# Patient Record
Sex: FEMALE | Race: WHITE | NOT HISPANIC OR LATINO | Employment: OTHER | URBAN - METROPOLITAN AREA
[De-identification: names, ages, dates, MRNs, and addresses within clinical notes are randomized per-mention and may not be internally consistent; named-entity substitution may affect disease eponyms.]

---

## 2023-04-20 LAB
RBC, URINE: NORMAL /HPF (ref 0–5)
SQUAMOUS EPITHELIAL CELLS, URINE: <1 /HPF
WBC, URINE: NORMAL /HPF (ref 0–5)

## 2023-09-13 LAB
ALANINE AMINOTRANSFERASE (SGPT) (U/L) IN SER/PLAS: 8 U/L (ref 7–45)
ALBUMIN (G/DL) IN SER/PLAS: 4.2 G/DL (ref 3.4–5)
ALKALINE PHOSPHATASE (U/L) IN SER/PLAS: 65 U/L (ref 33–136)
ANION GAP IN SER/PLAS: 12 MMOL/L (ref 10–20)
ASPARTATE AMINOTRANSFERASE (SGOT) (U/L) IN SER/PLAS: 13 U/L (ref 9–39)
BILIRUBIN TOTAL (MG/DL) IN SER/PLAS: 0.6 MG/DL (ref 0–1.2)
CALCIUM (MG/DL) IN SER/PLAS: 9.3 MG/DL (ref 8.6–10.3)
CARBON DIOXIDE, TOTAL (MMOL/L) IN SER/PLAS: 29 MMOL/L (ref 21–32)
CHLORIDE (MMOL/L) IN SER/PLAS: 103 MMOL/L (ref 98–107)
CHOLESTEROL (MG/DL) IN SER/PLAS: 197 MG/DL (ref 0–199)
CHOLESTEROL IN HDL (MG/DL) IN SER/PLAS: 71.2 MG/DL
CHOLESTEROL/HDL RATIO: 2.8
CREATININE (MG/DL) IN SER/PLAS: 1.06 MG/DL (ref 0.5–1.05)
ERYTHROCYTE DISTRIBUTION WIDTH (RATIO) BY AUTOMATED COUNT: 15.9 % (ref 11.5–14.5)
ERYTHROCYTE MEAN CORPUSCULAR HEMOGLOBIN CONCENTRATION (G/DL) BY AUTOMATED: 30.7 G/DL (ref 32–36)
ERYTHROCYTE MEAN CORPUSCULAR VOLUME (FL) BY AUTOMATED COUNT: 95 FL (ref 80–100)
ERYTHROCYTES (10*6/UL) IN BLOOD BY AUTOMATED COUNT: 4.98 X10E12/L (ref 4–5.2)
GFR FEMALE: 52 ML/MIN/1.73M2
GLUCOSE (MG/DL) IN SER/PLAS: 112 MG/DL (ref 74–99)
HEMATOCRIT (%) IN BLOOD BY AUTOMATED COUNT: 47.2 % (ref 36–46)
HEMOGLOBIN (G/DL) IN BLOOD: 14.5 G/DL (ref 12–16)
LDL: 114 MG/DL (ref 0–99)
LEUKOCYTES (10*3/UL) IN BLOOD BY AUTOMATED COUNT: 6.2 X10E9/L (ref 4.4–11.3)
NRBC (PER 100 WBCS) BY AUTOMATED COUNT: 0 /100 WBC (ref 0–0)
PLATELETS (10*3/UL) IN BLOOD AUTOMATED COUNT: 227 X10E9/L (ref 150–450)
POTASSIUM (MMOL/L) IN SER/PLAS: 4 MMOL/L (ref 3.5–5.3)
PROTEIN TOTAL: 7 G/DL (ref 6.4–8.2)
SODIUM (MMOL/L) IN SER/PLAS: 140 MMOL/L (ref 136–145)
THYROTROPIN (MIU/L) IN SER/PLAS BY DETECTION LIMIT <= 0.05 MIU/L: 1.85 MIU/L (ref 0.44–3.98)
TRIGLYCERIDE (MG/DL) IN SER/PLAS: 60 MG/DL (ref 0–149)
UREA NITROGEN (MG/DL) IN SER/PLAS: 18 MG/DL (ref 6–23)
VLDL: 12 MG/DL (ref 0–40)

## 2023-09-14 LAB
ESTIMATED AVERAGE GLUCOSE FOR HBA1C: 126 MG/DL
HEMOGLOBIN A1C/HEMOGLOBIN TOTAL IN BLOOD: 6 %

## 2023-11-28 ENCOUNTER — TELEPHONE (OUTPATIENT)
Dept: PRIMARY CARE | Facility: CLINIC | Age: 83
End: 2023-11-28

## 2023-11-28 NOTE — TELEPHONE ENCOUNTER
Prescription refill request    Amlodipine besylate 2.5    [facility] mail order

## 2023-12-02 DIAGNOSIS — I10 BENIGN HYPERTENSION: Primary | ICD-10-CM

## 2023-12-02 RX ORDER — AMLODIPINE BESYLATE 2.5 MG/1
2.5 TABLET ORAL DAILY
Qty: 90 TABLET | Refills: 1 | Status: SHIPPED | OUTPATIENT
Start: 2023-12-02 | End: 2024-02-07 | Stop reason: SDUPTHER

## 2023-12-02 RX ORDER — AMLODIPINE BESYLATE 2.5 MG/1
2.5 TABLET ORAL DAILY
COMMUNITY
End: 2023-12-02 | Stop reason: SDUPTHER

## 2024-01-17 ENCOUNTER — APPOINTMENT (OUTPATIENT)
Dept: RADIOLOGY | Facility: HOSPITAL | Age: 84
DRG: 280 | End: 2024-01-17
Payer: MEDICARE

## 2024-01-17 ENCOUNTER — HOSPITAL ENCOUNTER (INPATIENT)
Facility: HOSPITAL | Age: 84
LOS: 6 days | Discharge: HOME | DRG: 280 | End: 2024-01-23
Attending: STUDENT IN AN ORGANIZED HEALTH CARE EDUCATION/TRAINING PROGRAM | Admitting: STUDENT IN AN ORGANIZED HEALTH CARE EDUCATION/TRAINING PROGRAM
Payer: MEDICARE

## 2024-01-17 ENCOUNTER — APPOINTMENT (OUTPATIENT)
Dept: CARDIOLOGY | Facility: HOSPITAL | Age: 84
DRG: 280 | End: 2024-01-17
Payer: MEDICARE

## 2024-01-17 DIAGNOSIS — I50.9 CONGESTIVE HEART FAILURE, UNSPECIFIED HF CHRONICITY, UNSPECIFIED HEART FAILURE TYPE (MULTI): ICD-10-CM

## 2024-01-17 DIAGNOSIS — I49.8 VENTRICULAR BIGEMINY: ICD-10-CM

## 2024-01-17 DIAGNOSIS — J96.01 ACUTE HYPOXIC RESPIRATORY FAILURE (MULTI): ICD-10-CM

## 2024-01-17 DIAGNOSIS — J44.1 CHRONIC OBSTRUCTIVE PULMONARY DISEASE WITH ACUTE EXACERBATION (MULTI): ICD-10-CM

## 2024-01-17 DIAGNOSIS — M79.89 LEG SWELLING: ICD-10-CM

## 2024-01-17 DIAGNOSIS — I82.4Z9 DEEP VEIN THROMBOSIS (DVT) OF DISTAL VEIN OF LOWER EXTREMITY, UNSPECIFIED CHRONICITY, UNSPECIFIED LATERALITY (MULTI): ICD-10-CM

## 2024-01-17 DIAGNOSIS — R41.82 AMS (ALTERED MENTAL STATUS): Primary | ICD-10-CM

## 2024-01-17 DIAGNOSIS — I21.4 NSTEMI (NON-ST ELEVATED MYOCARDIAL INFARCTION) (MULTI): ICD-10-CM

## 2024-01-17 LAB
ABO GROUP (TYPE) IN BLOOD: NORMAL
ALBUMIN SERPL BCP-MCNC: 3.7 G/DL (ref 3.4–5)
ALP SERPL-CCNC: 79 U/L (ref 33–136)
ALT SERPL W P-5'-P-CCNC: 135 U/L (ref 7–45)
ANION GAP BLDV CALCULATED.4IONS-SCNC: 12 MMOL/L (ref 10–25)
ANION GAP SERPL CALC-SCNC: 15 MMOL/L (ref 10–20)
ANION GAP SERPL CALC-SCNC: 17 MMOL/L (ref 10–20)
ANTIBODY SCREEN: NORMAL
APPEARANCE UR: ABNORMAL
AST SERPL W P-5'-P-CCNC: 123 U/L (ref 9–39)
BACTERIA #/AREA URNS AUTO: ABNORMAL /HPF
BASE EXCESS BLDV CALC-SCNC: 0.9 MMOL/L (ref -2–3)
BASOPHILS # BLD AUTO: 0.02 X10*3/UL (ref 0–0.1)
BASOPHILS NFR BLD AUTO: 0.2 %
BILIRUB SERPL-MCNC: 0.5 MG/DL (ref 0–1.2)
BILIRUB UR STRIP.AUTO-MCNC: NEGATIVE MG/DL
BNP SERPL-MCNC: 2731 PG/ML (ref 0–99)
BODY TEMPERATURE: 37 DEGREES CELSIUS
BUN SERPL-MCNC: 51 MG/DL (ref 6–23)
BUN SERPL-MCNC: 51 MG/DL (ref 6–23)
CA-I BLDV-SCNC: 1.09 MMOL/L (ref 1.1–1.33)
CALCIUM SERPL-MCNC: 7.8 MG/DL (ref 8.6–10.3)
CALCIUM SERPL-MCNC: 8.1 MG/DL (ref 8.6–10.3)
CARDIAC TROPONIN I PNL SERPL HS: 556 NG/L (ref 0–13)
CARDIAC TROPONIN I PNL SERPL HS: 663 NG/L (ref 0–13)
CARDIAC TROPONIN I PNL SERPL HS: 769 NG/L (ref 0–13)
CHLORIDE BLDV-SCNC: 100 MMOL/L (ref 98–107)
CHLORIDE SERPL-SCNC: 100 MMOL/L (ref 98–107)
CHLORIDE SERPL-SCNC: 99 MMOL/L (ref 98–107)
CHOLEST SERPL-MCNC: 123 MG/DL (ref 0–199)
CHOLESTEROL/HDL RATIO: 2.6
CO2 SERPL-SCNC: 25 MMOL/L (ref 21–32)
CO2 SERPL-SCNC: 29 MMOL/L (ref 21–32)
COLOR UR: ABNORMAL
CREAT SERPL-MCNC: 1.85 MG/DL (ref 0.5–1.05)
CREAT SERPL-MCNC: 2.12 MG/DL (ref 0.5–1.05)
D DIMER PPP FEU-MCNC: 1748 NG/ML FEU
EGFRCR SERPLBLD CKD-EPI 2021: 23 ML/MIN/1.73M*2
EGFRCR SERPLBLD CKD-EPI 2021: 27 ML/MIN/1.73M*2
EOSINOPHIL # BLD AUTO: 0 X10*3/UL (ref 0–0.4)
EOSINOPHIL NFR BLD AUTO: 0 %
ERYTHROCYTE [DISTWIDTH] IN BLOOD BY AUTOMATED COUNT: 15.8 % (ref 11.5–14.5)
FLUAV RNA RESP QL NAA+PROBE: NOT DETECTED
FLUBV RNA RESP QL NAA+PROBE: NOT DETECTED
GLUCOSE BLD MANUAL STRIP-MCNC: 158 MG/DL (ref 74–99)
GLUCOSE BLDV-MCNC: 144 MG/DL (ref 74–99)
GLUCOSE SERPL-MCNC: 133 MG/DL (ref 74–99)
GLUCOSE SERPL-MCNC: 162 MG/DL (ref 74–99)
GLUCOSE UR STRIP.AUTO-MCNC: NEGATIVE MG/DL
HCO3 BLDV-SCNC: 26.6 MMOL/L (ref 22–26)
HCT VFR BLD AUTO: 34.4 % (ref 36–46)
HCT VFR BLD EST: 32 % (ref 36–46)
HDLC SERPL-MCNC: 47.6 MG/DL
HGB BLD-MCNC: 10.4 G/DL (ref 12–16)
HGB BLDV-MCNC: 10.8 G/DL (ref 12–16)
IMM GRANULOCYTES # BLD AUTO: 0.04 X10*3/UL (ref 0–0.5)
IMM GRANULOCYTES NFR BLD AUTO: 0.5 % (ref 0–0.9)
INHALED O2 CONCENTRATION: 36 %
KETONES UR STRIP.AUTO-MCNC: NEGATIVE MG/DL
LACTATE BLDV-SCNC: 2.2 MMOL/L (ref 0.4–2)
LACTATE SERPL-SCNC: 1.4 MMOL/L (ref 0.4–2)
LACTATE SERPL-SCNC: 1.6 MMOL/L (ref 0.4–2)
LDLC SERPL CALC-MCNC: 67 MG/DL
LEUKOCYTE ESTERASE UR QL STRIP.AUTO: NEGATIVE
LYMPHOCYTES # BLD AUTO: 0.95 X10*3/UL (ref 0.8–3)
LYMPHOCYTES NFR BLD AUTO: 11 %
MAGNESIUM SERPL-MCNC: 2.26 MG/DL (ref 1.6–2.4)
MCH RBC QN AUTO: 27.5 PG (ref 26–34)
MCHC RBC AUTO-ENTMCNC: 30.2 G/DL (ref 32–36)
MCV RBC AUTO: 91 FL (ref 80–100)
MONOCYTES # BLD AUTO: 1.04 X10*3/UL (ref 0.05–0.8)
MONOCYTES NFR BLD AUTO: 12 %
NEUTROPHILS # BLD AUTO: 6.61 X10*3/UL (ref 1.6–5.5)
NEUTROPHILS NFR BLD AUTO: 76.3 %
NITRITE UR QL STRIP.AUTO: NEGATIVE
NON HDL CHOLESTEROL: 75 MG/DL (ref 0–149)
NRBC BLD-RTO: 0.2 /100 WBCS (ref 0–0)
OXYHGB MFR BLDV: 80.9 % (ref 45–75)
PCO2 BLDV: 46 MM HG (ref 41–51)
PH BLDV: 7.37 PH (ref 7.33–7.43)
PH UR STRIP.AUTO: 5 [PH]
PLATELET # BLD AUTO: 296 X10*3/UL (ref 150–450)
PO2 BLDV: 56 MM HG (ref 35–45)
POTASSIUM BLDV-SCNC: 5.4 MMOL/L (ref 3.5–5.3)
POTASSIUM SERPL-SCNC: 4.3 MMOL/L (ref 3.5–5.3)
POTASSIUM SERPL-SCNC: 5.4 MMOL/L (ref 3.5–5.3)
PROT SERPL-MCNC: 5.9 G/DL (ref 6.4–8.2)
PROT UR STRIP.AUTO-MCNC: ABNORMAL MG/DL
RBC # BLD AUTO: 3.78 X10*6/UL (ref 4–5.2)
RBC # UR STRIP.AUTO: ABNORMAL /UL
RBC #/AREA URNS AUTO: >20 /HPF
RH FACTOR (ANTIGEN D): NORMAL
RSV RNA RESP QL NAA+PROBE: NOT DETECTED
SAO2 % BLDV: 85 % (ref 45–75)
SARS-COV-2 RNA RESP QL NAA+PROBE: NOT DETECTED
SODIUM BLDV-SCNC: 133 MMOL/L (ref 136–145)
SODIUM SERPL-SCNC: 137 MMOL/L (ref 136–145)
SODIUM SERPL-SCNC: 139 MMOL/L (ref 136–145)
SP GR UR STRIP.AUTO: 1.01
SQUAMOUS #/AREA URNS AUTO: ABNORMAL /HPF
TEST COMMENT: ABNORMAL
TRIGL SERPL-MCNC: 41 MG/DL (ref 0–149)
UFH PPP CHRO-ACNC: 0.7 IU/ML
UROBILINOGEN UR STRIP.AUTO-MCNC: <2 MG/DL
VLDL: 8 MG/DL (ref 0–40)
WBC # BLD AUTO: 8.7 X10*3/UL (ref 4.4–11.3)
WBC #/AREA URNS AUTO: ABNORMAL /HPF

## 2024-01-17 PROCEDURE — 96375 TX/PRO/DX INJ NEW DRUG ADDON: CPT

## 2024-01-17 PROCEDURE — 99285 EMERGENCY DEPT VISIT HI MDM: CPT | Performed by: STUDENT IN AN ORGANIZED HEALTH CARE EDUCATION/TRAINING PROGRAM

## 2024-01-17 PROCEDURE — 71250 CT THORAX DX C-: CPT

## 2024-01-17 PROCEDURE — 94640 AIRWAY INHALATION TREATMENT: CPT

## 2024-01-17 PROCEDURE — 85379 FIBRIN DEGRADATION QUANT: CPT | Performed by: STUDENT IN AN ORGANIZED HEALTH CARE EDUCATION/TRAINING PROGRAM

## 2024-01-17 PROCEDURE — 36415 COLL VENOUS BLD VENIPUNCTURE: CPT | Performed by: STUDENT IN AN ORGANIZED HEALTH CARE EDUCATION/TRAINING PROGRAM

## 2024-01-17 PROCEDURE — 1200000002 HC GENERAL ROOM WITH TELEMETRY DAILY

## 2024-01-17 PROCEDURE — 2500000001 HC RX 250 WO HCPCS SELF ADMINISTERED DRUGS (ALT 637 FOR MEDICARE OP)

## 2024-01-17 PROCEDURE — 93970 EXTREMITY STUDY: CPT

## 2024-01-17 PROCEDURE — 85025 COMPLETE CBC W/AUTO DIFF WBC: CPT | Performed by: STUDENT IN AN ORGANIZED HEALTH CARE EDUCATION/TRAINING PROGRAM

## 2024-01-17 PROCEDURE — 84484 ASSAY OF TROPONIN QUANT: CPT

## 2024-01-17 PROCEDURE — 84132 ASSAY OF SERUM POTASSIUM: CPT

## 2024-01-17 PROCEDURE — 99223 1ST HOSP IP/OBS HIGH 75: CPT

## 2024-01-17 PROCEDURE — 81001 URINALYSIS AUTO W/SCOPE: CPT | Performed by: STUDENT IN AN ORGANIZED HEALTH CARE EDUCATION/TRAINING PROGRAM

## 2024-01-17 PROCEDURE — 86738 MYCOPLASMA ANTIBODY: CPT

## 2024-01-17 PROCEDURE — 83735 ASSAY OF MAGNESIUM: CPT | Performed by: STUDENT IN AN ORGANIZED HEALTH CARE EDUCATION/TRAINING PROGRAM

## 2024-01-17 PROCEDURE — 85520 HEPARIN ASSAY: CPT | Performed by: STUDENT IN AN ORGANIZED HEALTH CARE EDUCATION/TRAINING PROGRAM

## 2024-01-17 PROCEDURE — 86901 BLOOD TYPING SEROLOGIC RH(D): CPT

## 2024-01-17 PROCEDURE — 87637 SARSCOV2&INF A&B&RSV AMP PRB: CPT | Performed by: STUDENT IN AN ORGANIZED HEALTH CARE EDUCATION/TRAINING PROGRAM

## 2024-01-17 PROCEDURE — 2500000002 HC RX 250 W HCPCS SELF ADMINISTERED DRUGS (ALT 637 FOR MEDICARE OP, ALT 636 FOR OP/ED): Mod: MUE

## 2024-01-17 PROCEDURE — 83605 ASSAY OF LACTIC ACID: CPT | Performed by: STUDENT IN AN ORGANIZED HEALTH CARE EDUCATION/TRAINING PROGRAM

## 2024-01-17 PROCEDURE — 70450 CT HEAD/BRAIN W/O DYE: CPT

## 2024-01-17 PROCEDURE — 2500000004 HC RX 250 GENERAL PHARMACY W/ HCPCS (ALT 636 FOR OP/ED): Performed by: STUDENT IN AN ORGANIZED HEALTH CARE EDUCATION/TRAINING PROGRAM

## 2024-01-17 PROCEDURE — 84132 ASSAY OF SERUM POTASSIUM: CPT | Performed by: STUDENT IN AN ORGANIZED HEALTH CARE EDUCATION/TRAINING PROGRAM

## 2024-01-17 PROCEDURE — 96365 THER/PROPH/DIAG IV INF INIT: CPT

## 2024-01-17 PROCEDURE — 83880 ASSAY OF NATRIURETIC PEPTIDE: CPT | Performed by: STUDENT IN AN ORGANIZED HEALTH CARE EDUCATION/TRAINING PROGRAM

## 2024-01-17 PROCEDURE — 82947 ASSAY GLUCOSE BLOOD QUANT: CPT

## 2024-01-17 PROCEDURE — 71045 X-RAY EXAM CHEST 1 VIEW: CPT

## 2024-01-17 PROCEDURE — 84484 ASSAY OF TROPONIN QUANT: CPT | Performed by: STUDENT IN AN ORGANIZED HEALTH CARE EDUCATION/TRAINING PROGRAM

## 2024-01-17 PROCEDURE — 71045 X-RAY EXAM CHEST 1 VIEW: CPT | Performed by: RADIOLOGY

## 2024-01-17 PROCEDURE — 93005 ELECTROCARDIOGRAM TRACING: CPT

## 2024-01-17 PROCEDURE — 80061 LIPID PANEL: CPT

## 2024-01-17 RX ORDER — POLYETHYLENE GLYCOL 3350 17 G/17G
17 POWDER, FOR SOLUTION ORAL DAILY
Status: DISCONTINUED | OUTPATIENT
Start: 2024-01-18 | End: 2024-01-23 | Stop reason: HOSPADM

## 2024-01-17 RX ORDER — HEPARIN SODIUM 10000 [USP'U]/100ML
0-4500 INJECTION, SOLUTION INTRAVENOUS CONTINUOUS
Status: DISCONTINUED | OUTPATIENT
Start: 2024-01-17 | End: 2024-01-19

## 2024-01-17 RX ORDER — DEXTROSE 50 % IN WATER (D50W) INTRAVENOUS SYRINGE
25
Status: DISCONTINUED | OUTPATIENT
Start: 2024-01-17 | End: 2024-01-23 | Stop reason: HOSPADM

## 2024-01-17 RX ORDER — ACETAMINOPHEN 160 MG/5ML
650 SOLUTION ORAL EVERY 4 HOURS PRN
Status: DISCONTINUED | OUTPATIENT
Start: 2024-01-17 | End: 2024-01-23 | Stop reason: HOSPADM

## 2024-01-17 RX ORDER — DEXTROSE MONOHYDRATE 100 MG/ML
0.3 INJECTION, SOLUTION INTRAVENOUS ONCE AS NEEDED
Status: DISCONTINUED | OUTPATIENT
Start: 2024-01-17 | End: 2024-01-23 | Stop reason: HOSPADM

## 2024-01-17 RX ORDER — IPRATROPIUM BROMIDE AND ALBUTEROL SULFATE 2.5; .5 MG/3ML; MG/3ML
3 SOLUTION RESPIRATORY (INHALATION)
Status: DISCONTINUED | OUTPATIENT
Start: 2024-01-18 | End: 2024-01-23 | Stop reason: HOSPADM

## 2024-01-17 RX ORDER — CEFTRIAXONE 1 G/50ML
1 INJECTION, SOLUTION INTRAVENOUS EVERY 24 HOURS
Status: DISCONTINUED | OUTPATIENT
Start: 2024-01-17 | End: 2024-01-23 | Stop reason: HOSPADM

## 2024-01-17 RX ORDER — ACETAMINOPHEN 325 MG/1
650 TABLET ORAL EVERY 4 HOURS PRN
Status: DISCONTINUED | OUTPATIENT
Start: 2024-01-17 | End: 2024-01-23 | Stop reason: HOSPADM

## 2024-01-17 RX ORDER — ASPIRIN 325 MG
325 TABLET ORAL ONCE
Status: COMPLETED | OUTPATIENT
Start: 2024-01-17 | End: 2024-01-17

## 2024-01-17 RX ORDER — INSULIN LISPRO 100 [IU]/ML
0-5 INJECTION, SOLUTION INTRAVENOUS; SUBCUTANEOUS 3 TIMES DAILY
Status: DISCONTINUED | OUTPATIENT
Start: 2024-01-17 | End: 2024-01-23 | Stop reason: HOSPADM

## 2024-01-17 RX ORDER — METFORMIN HYDROCHLORIDE 500 MG/1
1 TABLET ORAL DAILY
COMMUNITY
Start: 2022-03-01

## 2024-01-17 RX ORDER — HEPARIN SODIUM 5000 [USP'U]/ML
80 INJECTION, SOLUTION INTRAVENOUS; SUBCUTANEOUS ONCE
Status: COMPLETED | OUTPATIENT
Start: 2024-01-17 | End: 2024-01-17

## 2024-01-17 RX ORDER — AMLODIPINE BESYLATE 5 MG/1
2.5 TABLET ORAL DAILY
Status: DISCONTINUED | OUTPATIENT
Start: 2024-01-18 | End: 2024-01-23 | Stop reason: HOSPADM

## 2024-01-17 RX ORDER — NAPROXEN SODIUM 220 MG/1
81 TABLET, FILM COATED ORAL DAILY
Status: DISCONTINUED | OUTPATIENT
Start: 2024-01-18 | End: 2024-01-23 | Stop reason: HOSPADM

## 2024-01-17 RX ORDER — LEVOTHYROXINE SODIUM 75 UG/1
75 TABLET ORAL DAILY
Status: DISCONTINUED | OUTPATIENT
Start: 2024-01-18 | End: 2024-01-23 | Stop reason: HOSPADM

## 2024-01-17 RX ORDER — HEPARIN SODIUM 5000 [USP'U]/ML
2000-4000 INJECTION, SOLUTION INTRAVENOUS; SUBCUTANEOUS EVERY 4 HOURS PRN
Status: DISCONTINUED | OUTPATIENT
Start: 2024-01-17 | End: 2024-01-19

## 2024-01-17 RX ORDER — FUROSEMIDE 10 MG/ML
40 INJECTION INTRAMUSCULAR; INTRAVENOUS ONCE
Status: COMPLETED | OUTPATIENT
Start: 2024-01-17 | End: 2024-01-17

## 2024-01-17 RX ORDER — IPRATROPIUM BROMIDE AND ALBUTEROL SULFATE 2.5; .5 MG/3ML; MG/3ML
3 SOLUTION RESPIRATORY (INHALATION) EVERY 20 MIN
Status: DISCONTINUED | OUTPATIENT
Start: 2024-01-17 | End: 2024-01-17

## 2024-01-17 RX ORDER — MAGNESIUM SULFATE HEPTAHYDRATE 40 MG/ML
2 INJECTION, SOLUTION INTRAVENOUS ONCE
Status: COMPLETED | OUTPATIENT
Start: 2024-01-17 | End: 2024-01-17

## 2024-01-17 RX ORDER — IPRATROPIUM BROMIDE AND ALBUTEROL SULFATE 2.5; .5 MG/3ML; MG/3ML
SOLUTION RESPIRATORY (INHALATION)
Status: COMPLETED
Start: 2024-01-17 | End: 2024-01-17

## 2024-01-17 RX ORDER — IPRATROPIUM BROMIDE AND ALBUTEROL SULFATE 2.5; .5 MG/3ML; MG/3ML
3 SOLUTION RESPIRATORY (INHALATION)
Status: DISCONTINUED | OUTPATIENT
Start: 2024-01-17 | End: 2024-01-17

## 2024-01-17 RX ORDER — IPRATROPIUM BROMIDE AND ALBUTEROL SULFATE 2.5; .5 MG/3ML; MG/3ML
3 SOLUTION RESPIRATORY (INHALATION) EVERY 2 HOUR PRN
Status: DISCONTINUED | OUTPATIENT
Start: 2024-01-17 | End: 2024-01-23 | Stop reason: HOSPADM

## 2024-01-17 RX ORDER — LEVOTHYROXINE SODIUM 75 UG/1
1 TABLET ORAL DAILY
COMMUNITY
Start: 2021-09-08 | End: 2024-02-12 | Stop reason: SDUPTHER

## 2024-01-17 RX ADMIN — METHYLPREDNISOLONE SODIUM SUCCINATE 125 MG: 125 INJECTION INTRAMUSCULAR; INTRAVENOUS at 15:15

## 2024-01-17 RX ADMIN — ASPIRIN 325 MG ORAL TABLET 325 MG: 325 PILL ORAL at 23:22

## 2024-01-17 RX ADMIN — HEPARIN SODIUM 4450 UNITS: 5000 INJECTION INTRAVENOUS; SUBCUTANEOUS at 17:28

## 2024-01-17 RX ADMIN — HEPARIN SODIUM 1000 UNITS/HR: 10000 INJECTION, SOLUTION INTRAVENOUS at 17:26

## 2024-01-17 RX ADMIN — IPRATROPIUM BROMIDE AND ALBUTEROL SULFATE 3 ML: .5; 3 SOLUTION RESPIRATORY (INHALATION) at 14:54

## 2024-01-17 RX ADMIN — FUROSEMIDE 40 MG: 10 INJECTION, SOLUTION INTRAMUSCULAR; INTRAVENOUS at 17:21

## 2024-01-17 RX ADMIN — MAGNESIUM SULFATE HEPTAHYDRATE 2 G: 40 INJECTION, SOLUTION INTRAVENOUS at 15:16

## 2024-01-17 SDOH — HEALTH STABILITY: PHYSICAL HEALTH: ON AVERAGE, HOW MANY MINUTES DO YOU ENGAGE IN EXERCISE AT THIS LEVEL?: 0 MIN

## 2024-01-17 SDOH — SOCIAL STABILITY: SOCIAL INSECURITY: ABUSE: ADULT

## 2024-01-17 SDOH — SOCIAL STABILITY: SOCIAL INSECURITY: ARE YOU OR HAVE YOU BEEN THREATENED OR ABUSED PHYSICALLY, EMOTIONALLY, OR SEXUALLY BY ANYONE?: NO

## 2024-01-17 SDOH — HEALTH STABILITY: MENTAL HEALTH: HOW OFTEN DO YOU HAVE A DRINK CONTAINING ALCOHOL?: NEVER

## 2024-01-17 SDOH — ECONOMIC STABILITY: FOOD INSECURITY: WITHIN THE PAST 12 MONTHS, THE FOOD YOU BOUGHT JUST DIDN'T LAST AND YOU DIDN'T HAVE MONEY TO GET MORE.: NEVER TRUE

## 2024-01-17 SDOH — SOCIAL STABILITY: SOCIAL INSECURITY
WITHIN THE LAST YEAR, HAVE YOU BEEN KICKED, HIT, SLAPPED, OR OTHERWISE PHYSICALLY HURT BY YOUR PARTNER OR EX-PARTNER?: NO

## 2024-01-17 SDOH — SOCIAL STABILITY: SOCIAL INSECURITY: HAVE YOU HAD THOUGHTS OF HARMING ANYONE ELSE?: NO

## 2024-01-17 SDOH — HEALTH STABILITY: PHYSICAL HEALTH: ON AVERAGE, HOW MANY DAYS PER WEEK DO YOU ENGAGE IN MODERATE TO STRENUOUS EXERCISE (LIKE A BRISK WALK)?: 0 DAYS

## 2024-01-17 SDOH — SOCIAL STABILITY: SOCIAL NETWORK: HOW OFTEN DO YOU ATTEND CHURCH OR RELIGIOUS SERVICES?: NEVER

## 2024-01-17 SDOH — HEALTH STABILITY: MENTAL HEALTH
STRESS IS WHEN SOMEONE FEELS TENSE, NERVOUS, ANXIOUS, OR CAN'T SLEEP AT NIGHT BECAUSE THEIR MIND IS TROUBLED. HOW STRESSED ARE YOU?: NOT AT ALL

## 2024-01-17 SDOH — SOCIAL STABILITY: SOCIAL NETWORK
DO YOU BELONG TO ANY CLUBS OR ORGANIZATIONS SUCH AS CHURCH GROUPS UNIONS, FRATERNAL OR ATHLETIC GROUPS, OR SCHOOL GROUPS?: NO

## 2024-01-17 SDOH — SOCIAL STABILITY: SOCIAL INSECURITY: WITHIN THE LAST YEAR, HAVE YOU BEEN AFRAID OF YOUR PARTNER OR EX-PARTNER?: NO

## 2024-01-17 SDOH — SOCIAL STABILITY: SOCIAL NETWORK: HOW OFTEN DO YOU ATTENT MEETINGS OF THE CLUB OR ORGANIZATION YOU BELONG TO?: NEVER

## 2024-01-17 SDOH — SOCIAL STABILITY: SOCIAL INSECURITY
WITHIN THE LAST YEAR, HAVE TO BEEN RAPED OR FORCED TO HAVE ANY KIND OF SEXUAL ACTIVITY BY YOUR PARTNER OR EX-PARTNER?: NO

## 2024-01-17 SDOH — HEALTH STABILITY: MENTAL HEALTH: HOW MANY STANDARD DRINKS CONTAINING ALCOHOL DO YOU HAVE ON A TYPICAL DAY?: PATIENT DOES NOT DRINK

## 2024-01-17 SDOH — SOCIAL STABILITY: SOCIAL NETWORK: IN A TYPICAL WEEK, HOW MANY TIMES DO YOU TALK ON THE PHONE WITH FAMILY, FRIENDS, OR NEIGHBORS?: ONCE A WEEK

## 2024-01-17 SDOH — SOCIAL STABILITY: SOCIAL INSECURITY: DO YOU FEEL UNSAFE GOING BACK TO THE PLACE WHERE YOU ARE LIVING?: NO

## 2024-01-17 SDOH — HEALTH STABILITY: MENTAL HEALTH: HOW OFTEN DO YOU HAVE 6 OR MORE DRINKS ON ONE OCCASION?: NEVER

## 2024-01-17 SDOH — SOCIAL STABILITY: SOCIAL INSECURITY: WITHIN THE LAST YEAR, HAVE YOU BEEN HUMILIATED OR EMOTIONALLY ABUSED IN OTHER WAYS BY YOUR PARTNER OR EX-PARTNER?: NO

## 2024-01-17 SDOH — ECONOMIC STABILITY: FOOD INSECURITY: WITHIN THE PAST 12 MONTHS, YOU WORRIED THAT YOUR FOOD WOULD RUN OUT BEFORE YOU GOT MONEY TO BUY MORE.: NEVER TRUE

## 2024-01-17 SDOH — SOCIAL STABILITY: SOCIAL INSECURITY: DO YOU FEEL ANYONE HAS EXPLOITED OR TAKEN ADVANTAGE OF YOU FINANCIALLY OR OF YOUR PERSONAL PROPERTY?: NO

## 2024-01-17 SDOH — SOCIAL STABILITY: SOCIAL NETWORK: ARE YOU MARRIED, WIDOWED, DIVORCED, SEPARATED, NEVER MARRIED, OR LIVING WITH A PARTNER?: WIDOWED

## 2024-01-17 SDOH — ECONOMIC STABILITY: INCOME INSECURITY: IN THE PAST 12 MONTHS, HAS THE ELECTRIC, GAS, OIL, OR WATER COMPANY THREATENED TO SHUT OFF SERVICE IN YOUR HOME?: NO

## 2024-01-17 SDOH — SOCIAL STABILITY: SOCIAL NETWORK: HOW OFTEN DO YOU GET TOGETHER WITH FRIENDS OR RELATIVES?: NEVER

## 2024-01-17 SDOH — SOCIAL STABILITY: SOCIAL INSECURITY: HAS ANYONE EVER THREATENED TO HURT YOUR FAMILY OR YOUR PETS?: NO

## 2024-01-17 SDOH — SOCIAL STABILITY: SOCIAL INSECURITY: ARE THERE ANY APPARENT SIGNS OF INJURIES/BEHAVIORS THAT COULD BE RELATED TO ABUSE/NEGLECT?: NO

## 2024-01-17 SDOH — SOCIAL STABILITY: SOCIAL INSECURITY: DOES ANYONE TRY TO KEEP YOU FROM HAVING/CONTACTING OTHER FRIENDS OR DOING THINGS OUTSIDE YOUR HOME?: NO

## 2024-01-17 SDOH — SOCIAL STABILITY: SOCIAL INSECURITY: WERE YOU ABLE TO COMPLETE ALL THE BEHAVIORAL HEALTH SCREENINGS?: YES

## 2024-01-17 ASSESSMENT — PATIENT HEALTH QUESTIONNAIRE - PHQ9
1. LITTLE INTEREST OR PLEASURE IN DOING THINGS: NOT AT ALL
SUM OF ALL RESPONSES TO PHQ9 QUESTIONS 1 & 2: 0
2. FEELING DOWN, DEPRESSED OR HOPELESS: NOT AT ALL

## 2024-01-17 ASSESSMENT — COGNITIVE AND FUNCTIONAL STATUS - GENERAL
TURNING FROM BACK TO SIDE WHILE IN FLAT BAD: A LITTLE
WALKING IN HOSPITAL ROOM: A LITTLE
STANDING UP FROM CHAIR USING ARMS: A LITTLE
STANDING UP FROM CHAIR USING ARMS: A LITTLE
MOBILITY SCORE: 15
MOVING TO AND FROM BED TO CHAIR: A LITTLE
MOVING FROM LYING ON BACK TO SITTING ON SIDE OF FLAT BED WITH BEDRAILS: A LITTLE
TOILETING: A LITTLE
MOBILITY SCORE: 21
PATIENT BASELINE BEDBOUND: NO
MOVING TO AND FROM BED TO CHAIR: A LOT
DRESSING REGULAR LOWER BODY CLOTHING: A LITTLE
PATIENT BASELINE BEDBOUND: NO
DAILY ACTIVITIY SCORE: 21
WALKING IN HOSPITAL ROOM: A LOT
CLIMB 3 TO 5 STEPS WITH RAILING: A LOT
HELP NEEDED FOR BATHING: A LITTLE
DAILY ACTIVITIY SCORE: 24

## 2024-01-17 ASSESSMENT — LIFESTYLE VARIABLES
AUDIT-C TOTAL SCORE: 0
AUDIT-C TOTAL SCORE: 0
SKIP TO QUESTIONS 9-10: 1
HOW OFTEN DO YOU HAVE 6 OR MORE DRINKS ON ONE OCCASION: NEVER
SUBSTANCE_ABUSE_PAST_12_MONTHS: NO
HOW MANY STANDARD DRINKS CONTAINING ALCOHOL DO YOU HAVE ON A TYPICAL DAY: PATIENT DOES NOT DRINK
AUDIT-C TOTAL SCORE: 0
SUBSTANCE_ABUSE_PAST_12_MONTHS: NO
HOW OFTEN DO YOU HAVE A DRINK CONTAINING ALCOHOL: NEVER
PRESCIPTION_ABUSE_PAST_12_MONTHS: NO
PRESCIPTION_ABUSE_PAST_12_MONTHS: NO
SKIP TO QUESTIONS 9-10: 1

## 2024-01-17 ASSESSMENT — ACTIVITIES OF DAILY LIVING (ADL)
LACK_OF_TRANSPORTATION: NO
HEARING - RIGHT EAR: FUNCTIONAL
FEEDING YOURSELF: INDEPENDENT
GROOMING: INDEPENDENT
DRESSING YOURSELF: NEEDS ASSISTANCE
PATIENT'S MEMORY ADEQUATE TO SAFELY COMPLETE DAILY ACTIVITIES?: NO
TOILETING: NEEDS ASSISTANCE
HEARING - LEFT EAR: FUNCTIONAL
WALKS IN HOME: NEEDS ASSISTANCE
ADEQUATE_TO_COMPLETE_ADL: YES
JUDGMENT_ADEQUATE_SAFELY_COMPLETE_DAILY_ACTIVITIES: YES
BATHING: NEEDS ASSISTANCE

## 2024-01-17 ASSESSMENT — COLUMBIA-SUICIDE SEVERITY RATING SCALE - C-SSRS
6. HAVE YOU EVER DONE ANYTHING, STARTED TO DO ANYTHING, OR PREPARED TO DO ANYTHING TO END YOUR LIFE?: NO
2. HAVE YOU ACTUALLY HAD ANY THOUGHTS OF KILLING YOURSELF?: NO
1. IN THE PAST MONTH, HAVE YOU WISHED YOU WERE DEAD OR WISHED YOU COULD GO TO SLEEP AND NOT WAKE UP?: NO
1. IN THE PAST MONTH, HAVE YOU WISHED YOU WERE DEAD OR WISHED YOU COULD GO TO SLEEP AND NOT WAKE UP?: NO
6. HAVE YOU EVER DONE ANYTHING, STARTED TO DO ANYTHING, OR PREPARED TO DO ANYTHING TO END YOUR LIFE?: NO
6. HAVE YOU EVER DONE ANYTHING, STARTED TO DO ANYTHING, OR PREPARED TO DO ANYTHING TO END YOUR LIFE?: NO
2. HAVE YOU ACTUALLY HAD ANY THOUGHTS OF KILLING YOURSELF?: NO
1. IN THE PAST MONTH, HAVE YOU WISHED YOU WERE DEAD OR WISHED YOU COULD GO TO SLEEP AND NOT WAKE UP?: NO
2. HAVE YOU ACTUALLY HAD ANY THOUGHTS OF KILLING YOURSELF?: NO

## 2024-01-17 NOTE — PROGRESS NOTES
Pharmacy Medication History Review    Lupe Oshea is a 83 y.o. female admitted for No Principal Problem: There is no principal problem currently on the Problem List. Please update the Problem List and refresh.. Pharmacy reviewed the patient's [id number] medications and allergies for accuracy.    The list below reflectives the updated PTA list. Please review each medication in order reconciliation for additional clarification and justification.  (Not in a hospital admission)       The list below reflectives the updated allergy list. Please review each documented allergy for additional clarification and justification.  Allergies  Reviewed by Taryn Rodríguez CPhT on 1/17/2024   No Known Allergies         Below are additional concerns with the patient's PTA list.    See PTA med list    Taryn Rodríguez CPhT

## 2024-01-17 NOTE — ED PROVIDER NOTES
HPI   Chief Complaint   Patient presents with    Altered Mental Status     Pt states confusion x 1 week. Family states pt normally alert x4. Pt family states pt is stating things are there but are not. Pt fell mechanically last evening on carpet. No thinners. Did not hit head.        HPI     Patient is an 83-year-old female presenting to the emergency department in the setting of confusion for the last week, today increasingly confused and also last night had a mechanical fall.  She is not on any anticoagulation and did not hit her head.  She states she has no pain.  Denies any chest pain, shoulder pain, jaw pain.  No abdominal pain, lower extremity pain or swelling or edema.  She states she is a smoker has been smoking since she was 11 and continues to smoke but now is down to 1 cigarette a day since she has not been feeling very well.  No known specific sick contacts, cough fevers or chills.  She states that she does follow with a outpatient provider.               Nash Coma Scale Score: 15                  Patient History   Past Medical History:   Diagnosis Date    Personal history of other diseases of the circulatory system     History of hypertension    Personal history of other endocrine, nutritional and metabolic disease     History of hyperglycemia    Personal history of other endocrine, nutritional and metabolic disease     History of hyperlipidemia     Past Surgical History:   Procedure Laterality Date    OTHER SURGICAL HISTORY  07/13/2022    No history of surgery     No family history on file.  Social History     Tobacco Use    Smoking status: Every Day     Packs/day: .25     Types: Cigarettes     Passive exposure: Past    Smokeless tobacco: Never   Vaping Use    Vaping Use: Never used   Substance Use Topics    Alcohol use: Not on file    Drug use: Not on file       Physical Exam   ED Triage Vitals   Temp Heart Rate Resp BP   01/17/24 1427 01/17/24 1427 -- 01/17/24 1427   37.1 °C (98.8 °F) (!) 46   107/53      SpO2 Temp Source Heart Rate Source Patient Position   01/17/24 1427 01/17/24 1427 -- --   (!) 77 % Temporal        BP Location FiO2 (%)     01/17/24 1427 01/17/24 1454     Right arm 44 %       Physical Exam  Vitals and nursing note reviewed.   Constitutional:       General: She is not in acute distress.     Appearance: She is well-developed.   HENT:      Head: Normocephalic and atraumatic.   Eyes:      Conjunctiva/sclera: Conjunctivae normal.   Cardiovascular:      Rate and Rhythm: Normal rate and regular rhythm.      Heart sounds: No murmur heard.  Pulmonary:      Effort: Tachypnea present.      Breath sounds: No decreased air movement. Examination of the right-middle field reveals wheezing. Examination of the left-middle field reveals wheezing. Examination of the right-lower field reveals wheezing and rales. Examination of the left-lower field reveals wheezing and rales. Wheezing and rales present.   Abdominal:      Palpations: Abdomen is soft.      Tenderness: There is no abdominal tenderness.   Musculoskeletal:         General: No swelling.      Cervical back: Neck supple.   Skin:     General: Skin is warm and dry.      Capillary Refill: Capillary refill takes less than 2 seconds.   Neurological:      Mental Status: She is alert.   Psychiatric:         Mood and Affect: Mood normal.         ED Course & MDM   Diagnoses as of 01/18/24 1116   AMS (altered mental status)   Ventricular bigeminy   Acute hypoxic respiratory failure (CMS/HCC)   Chronic obstructive pulmonary disease with acute exacerbation (CMS/HCC)   Congestive heart failure, unspecified HF chronicity, unspecified heart failure type (CMS/HCC)       Medical Decision Making    Is a an 83-year-old female presenting as above on arrival she is bradycardic, hypoxic.  She is afebrile, normotensive.  She is alert and oriented x 3 moving all extremities.  She improved with nasal cannula oxygen up to 94% on 6 L.  She is in bigeminy on telemetry.  EKG  demonstrating rate 105, ventricular bigeminy. No ST segment changed c/w acute ischemia, patient without chest pain. Denies any pain at all. She has some lower lung expiratory wheezing with some overlying crackles throughout.  Trialed here with breathing treatments and workup pursued.  Findings notable for BNP significantly elevated, troponin and D-dimer elevated for which a CT angio chest cannot be ordered in the setting of LAUREN which is likely secondary to cardiorenal disease vs decreased PO intake. Patient will need VQ scan however techs not in house at this time. VQ ordered for STAT performance tomorrow. Patient updated and will plan to start heparin gtt. Plan for admission.      Procedure  Procedures     Analia Carrillo MD  01/18/24 7918

## 2024-01-17 NOTE — H&P
Subjective   HPI  Lupe Oshea is a 83 y.o. female with past medical history of HTN, HLD, hypothyroidism, CKD Stage III, T2DM who presented to [facility] ED on 1/17/24 with confusion for the last week and following a mechanical fall. Family at bedside and supplemented history. Patient's symptoms had been going on for the last 5 days with acute worsening in symptoms over the last 24 hours. During this time the patient has endorses worsening cough, weakness, and confusion. The patient endorses poor PO intake and increased lethargy. She denies any chest pain, shortness of breath, fever or chills during this time. She denies any weight gain or peripheral edema as well. Patient denies any history of clots, MI, or CHF. Last night/this morning, the patient fell after getting up from bed. It was unwitnessed; however, the patient's Son heard the fall and quickly came to the scene. Patient denies hitting her head. Patient is not on any blood thinners. Family mentions that the time of presenting to the ED the patient had been more altered and not like herself than she had been the following week/    In the ED, vitals were significant for HR 46, SpO2 77% on RA, /53. Patient was placed on 6L NC with improvement in hypoxia to 94%. Labs significant for K 5.4, Cr 2.12 (from 1.06), , , BNP 2731, Trop 556 -> 769, d-dimer 1748, Hgb 10.4 (from 14.5). CXR showed fibrosis and/or mild or early congestion and left lower lung atelectasis or infiltrate. Patient was started on heparin gtt in ED. Patient was also given 2g IV magnesium, 125mg Solu-Medrol, and 40mg IVP lasix in the ED.     PMH: As stated above.   PSH: No previous surgeries on file.  Social: Current smoker, no alcohol use, no illicit drug use  Family: Patient denies any family history of heart disease or clots. She denies personal history of clots  Medications/Allergies: NKDA    ROS: 12 systems reviewed and negative except otherwise noted in HPI  above.    Objective   Vitals:    01/17/24 1800   BP: 114/54   Pulse: 97   Resp:    Temp:    SpO2: 91%      Physical Exam  General:  Pleasant and cooperative. No apparent distress.  HEENT:  Normocephalic, atraumatic, mucus membranes moist.   Neck:  Trachea midline.     Chest:  No crackles or significant wheezing  CV:  Regular rate and rhythm.  Positive S1/S2.   Abdomen: Bowel sounds present in all four quadrants, abdomen is soft, non-tender, non-distended.  Extremities:  No lower extremity edema or cyanosis.   Neurological:  AAOx3. No focal deficits.  Skin:  Warm and dry.    Assessment/Plan   Lupe Oshea is a 83 y.o. female with past medical history of HTN, HLD, hypothyroidism, CKD Stage III, T2DM who presented to [facility] ED on 1/17/24 with confusion for the last week and following a mechanical fall. Patient admitted to General Medicine service for evaluation and management of hypoxia with suspect PE, LAUREN on CKD, and elevated troponin.    #Acute Hypoxic Respiratory Failure  #Concerns for PE with elevated D-dimer  #Possible Pneumonia  - Patient has new O2 requiring 6L NC, wean as tolerated.  - Patient started on heparin gtt for concerns for PE, will follow-up V/Q scan  - Consider CT PE with improvement in kidney function  - CXR showed LLL infiltrates and history consistent with infection, will treat for CAP, will follow-up CT chest.  - Pulmonology consulted for above findings/concerns.  - Scheduled and PRN duonebs    #Elevated Troponin, no chest pain  #Concerns for new CHF  #Concerns for possible NSTEMI  - Patient denies chest pain; however, sudden onset of symptoms may be suggestive of ischemic etiology. Continue with heparin gtt as stated above. Will give loading dose aspirin and continue with 81mg aspirin. Trend troponins. NPO at midnight.  - Findings may also be due to acute HF, patient received 40IVP lasix in ED and is lasix naive. Appears euvolemic on exam. BNP elevated to 2731. Will check Echo.  -  Cardiology consulted    #LAUREN on CKD III  #Hyperkalemia in setting of above  - BUN/Cr >20, LAUREN likely pre-renal in setting of poor PO intake or possible HF. Will continue to monitor, patient is having adequate urine output.   - No EKG findings with hyperkalemia, will trend following diuresis  - Nephrology consulted    #Acute Anemia (baseline Hgb 14)  - Unclear etiology, patient denies any blood loss. Possibly due to acute illness. Will check iron studies. Maintain Type and Screen.  - Continue to monitor in setting of anticoagulation and aspirin    #Transaminitis  - Likely secondary to acute illness and possible HF, will continue to monitor and trend LFPs.    Chronic Conditions:  #HTN: Hold home 2.5 amlodipine   #Hypothyroidism: Continue home 75 mcg synthroid  #T2DM: Lispro sliding scale    DVT: Heparin gtt  Diet: Cardiac, NPO at midnight  IVF: None  Antibiotics: Rocephin and Azithro (day 1)  Consults: Pulmonology, Cardiology, Nephrology  Disposition: Acute - Telemetry    CODE: FULL CODE    This is a preliminary note, please await attending attestation for a finalized plan.    Gigi Love MD  Internal Medicine PGY2  Please message me with any questions

## 2024-01-18 ENCOUNTER — APPOINTMENT (OUTPATIENT)
Dept: CARDIOLOGY | Facility: HOSPITAL | Age: 84
DRG: 280 | End: 2024-01-18
Payer: MEDICARE

## 2024-01-18 ENCOUNTER — APPOINTMENT (OUTPATIENT)
Dept: RADIOLOGY | Facility: HOSPITAL | Age: 84
DRG: 280 | End: 2024-01-18
Payer: MEDICARE

## 2024-01-18 LAB
ALBUMIN SERPL BCP-MCNC: 3.4 G/DL (ref 3.4–5)
ALP SERPL-CCNC: 65 U/L (ref 33–136)
ALT SERPL W P-5'-P-CCNC: 106 U/L (ref 7–45)
AORTIC VALVE PEAK VELOCITY: 1.56
AST SERPL W P-5'-P-CCNC: 69 U/L (ref 9–39)
AV PEAK GRADIENT: 9.7
AVA (PEAK VEL): 1.04
BILIRUB DIRECT SERPL-MCNC: 0.1 MG/DL (ref 0–0.3)
BILIRUB SERPL-MCNC: 0.3 MG/DL (ref 0–1.2)
CREAT UR-MCNC: 46.5 MG/DL (ref 20–320)
CREAT UR-MCNC: 46.5 MG/DL (ref 20–320)
EJECTION FRACTION APICAL 4 CHAMBER: 61.2
EJECTION FRACTION: 59
ERYTHROCYTE [DISTWIDTH] IN BLOOD BY AUTOMATED COUNT: 15.5 % (ref 11.5–14.5)
ERYTHROCYTE [DISTWIDTH] IN BLOOD BY AUTOMATED COUNT: 15.6 % (ref 11.5–14.5)
FERRITIN SERPL-MCNC: 11 NG/ML (ref 8–150)
GLUCOSE BLD MANUAL STRIP-MCNC: 132 MG/DL (ref 74–99)
GLUCOSE BLD MANUAL STRIP-MCNC: 151 MG/DL (ref 74–99)
HCT VFR BLD AUTO: 32 % (ref 36–46)
HCT VFR BLD AUTO: 32.8 % (ref 36–46)
HGB BLD-MCNC: 10 G/DL (ref 12–16)
HGB BLD-MCNC: 9.6 G/DL (ref 12–16)
HOLD SPECIMEN: NORMAL
IRON SATN MFR SERPL: ABNORMAL %
IRON SERPL-MCNC: <10 UG/DL (ref 35–150)
LDH SERPL L TO P-CCNC: 215 U/L (ref 84–246)
LEFT ATRIUM VOLUME AREA LENGTH INDEX BSA: 27.5
LEFT VENTRICLE INTERNAL DIMENSION DIASTOLE: 4.54 (ref 3.5–6)
LEFT VENTRICULAR OUTFLOW TRACT DIAMETER: 1.8
LEGIONELLA AG UR QL: NEGATIVE
MAGNESIUM SERPL-MCNC: 2.63 MG/DL (ref 1.6–2.4)
MCH RBC QN AUTO: 27.1 PG (ref 26–34)
MCH RBC QN AUTO: 27.2 PG (ref 26–34)
MCHC RBC AUTO-ENTMCNC: 30 G/DL (ref 32–36)
MCHC RBC AUTO-ENTMCNC: 30.5 G/DL (ref 32–36)
MCV RBC AUTO: 89 FL (ref 80–100)
MCV RBC AUTO: 91 FL (ref 80–100)
MICROALBUMIN UR-MCNC: 474.2 MG/L
MICROALBUMIN/CREAT UR: 1019.8 UG/MG CREAT
NRBC BLD-RTO: 0.2 /100 WBCS (ref 0–0)
NRBC BLD-RTO: 0.4 /100 WBCS (ref 0–0)
PHOSPHATE SERPL-MCNC: 6 MG/DL (ref 2.5–4.9)
PLATELET # BLD AUTO: 243 X10*3/UL (ref 150–450)
PLATELET # BLD AUTO: 270 X10*3/UL (ref 150–450)
PROCALCITONIN SERPL-MCNC: 0.23 NG/ML
PROCALCITONIN SERPL-MCNC: 0.67 NG/ML
PROT SERPL-MCNC: 5.5 G/DL (ref 6.4–8.2)
PROT UR-ACNC: 350 MG/DL (ref 5–24)
PROT/CREAT UR: 7.53 MG/MG CREAT (ref 0–0.17)
RBC # BLD AUTO: 3.53 X10*6/UL (ref 4–5.2)
RBC # BLD AUTO: 3.69 X10*6/UL (ref 4–5.2)
RIGHT VENTRICLE FREE WALL PEAK S': 11.1
RIGHT VENTRICLE PEAK SYSTOLIC PRESSURE: 50.5
S PNEUM AG UR QL: NEGATIVE
TIBC SERPL-MCNC: ABNORMAL UG/DL
TRANSFERRIN SERPL-MCNC: 298 MG/DL (ref 200–360)
TRICUSPID ANNULAR PLANE SYSTOLIC EXCURSION: 2.4
UFH PPP CHRO-ACNC: 0.5 IU/ML
UIBC SERPL-MCNC: 369 UG/DL (ref 110–370)
WBC # BLD AUTO: 8.3 X10*3/UL (ref 4.4–11.3)
WBC # BLD AUTO: 9.1 X10*3/UL (ref 4.4–11.3)

## 2024-01-18 PROCEDURE — 86160 COMPLEMENT ANTIGEN: CPT | Mod: PARLAB

## 2024-01-18 PROCEDURE — 76770 US EXAM ABDO BACK WALL COMP: CPT | Performed by: RADIOLOGY

## 2024-01-18 PROCEDURE — 93306 TTE W/DOPPLER COMPLETE: CPT | Performed by: STUDENT IN AN ORGANIZED HEALTH CARE EDUCATION/TRAINING PROGRAM

## 2024-01-18 PROCEDURE — 94640 AIRWAY INHALATION TREATMENT: CPT

## 2024-01-18 PROCEDURE — 83735 ASSAY OF MAGNESIUM: CPT

## 2024-01-18 PROCEDURE — 97165 OT EVAL LOW COMPLEX 30 MIN: CPT | Mod: GO

## 2024-01-18 PROCEDURE — 85520 HEPARIN ASSAY: CPT | Performed by: STUDENT IN AN ORGANIZED HEALTH CARE EDUCATION/TRAINING PROGRAM

## 2024-01-18 PROCEDURE — 87449 NOS EACH ORGANISM AG IA: CPT | Mod: PARLAB

## 2024-01-18 PROCEDURE — 80076 HEPATIC FUNCTION PANEL: CPT

## 2024-01-18 PROCEDURE — 3430000001 HC RX 343 DIAGNOSTIC RADIOPHARMACEUTICALS: Performed by: STUDENT IN AN ORGANIZED HEALTH CARE EDUCATION/TRAINING PROGRAM

## 2024-01-18 PROCEDURE — 36415 COLL VENOUS BLD VENIPUNCTURE: CPT | Performed by: STUDENT IN AN ORGANIZED HEALTH CARE EDUCATION/TRAINING PROGRAM

## 2024-01-18 PROCEDURE — 86334 IMMUNOFIX E-PHORESIS SERUM: CPT | Mod: PARLAB

## 2024-01-18 PROCEDURE — 84145 PROCALCITONIN (PCT): CPT | Mod: PARLAB | Performed by: STUDENT IN AN ORGANIZED HEALTH CARE EDUCATION/TRAINING PROGRAM

## 2024-01-18 PROCEDURE — 2500000004 HC RX 250 GENERAL PHARMACY W/ HCPCS (ALT 636 FOR OP/ED)

## 2024-01-18 PROCEDURE — 86255 FLUORESCENT ANTIBODY SCREEN: CPT

## 2024-01-18 PROCEDURE — 87899 AGENT NOS ASSAY W/OPTIC: CPT | Mod: PARLAB | Performed by: INTERNAL MEDICINE

## 2024-01-18 PROCEDURE — 87899 AGENT NOS ASSAY W/OPTIC: CPT | Mod: PARLAB

## 2024-01-18 PROCEDURE — 83615 LACTATE (LD) (LDH) ENZYME: CPT

## 2024-01-18 PROCEDURE — 84165 PROTEIN E-PHORESIS SERUM: CPT

## 2024-01-18 PROCEDURE — 94640 AIRWAY INHALATION TREATMENT: CPT | Mod: MUE

## 2024-01-18 PROCEDURE — 2500000004 HC RX 250 GENERAL PHARMACY W/ HCPCS (ALT 636 FOR OP/ED): Performed by: STUDENT IN AN ORGANIZED HEALTH CARE EDUCATION/TRAINING PROGRAM

## 2024-01-18 PROCEDURE — 86320 SERUM IMMUNOELECTROPHORESIS: CPT

## 2024-01-18 PROCEDURE — 97161 PT EVAL LOW COMPLEX 20 MIN: CPT | Mod: GP

## 2024-01-18 PROCEDURE — 2500000002 HC RX 250 W HCPCS SELF ADMINISTERED DRUGS (ALT 637 FOR MEDICARE OP, ALT 636 FOR OP/ED): Mod: MUE | Performed by: INTERNAL MEDICINE

## 2024-01-18 PROCEDURE — 85027 COMPLETE CBC AUTOMATED: CPT | Performed by: STUDENT IN AN ORGANIZED HEALTH CARE EDUCATION/TRAINING PROGRAM

## 2024-01-18 PROCEDURE — 70450 CT HEAD/BRAIN W/O DYE: CPT | Performed by: RADIOLOGY

## 2024-01-18 PROCEDURE — 78803 RP LOCLZJ TUM SPECT 1 AREA: CPT | Performed by: STUDENT IN AN ORGANIZED HEALTH CARE EDUCATION/TRAINING PROGRAM

## 2024-01-18 PROCEDURE — 1200000002 HC GENERAL ROOM WITH TELEMETRY DAILY

## 2024-01-18 PROCEDURE — 82043 UR ALBUMIN QUANTITATIVE: CPT

## 2024-01-18 PROCEDURE — 76770 US EXAM ABDO BACK WALL COMP: CPT

## 2024-01-18 PROCEDURE — 2500000001 HC RX 250 WO HCPCS SELF ADMINISTERED DRUGS (ALT 637 FOR MEDICARE OP)

## 2024-01-18 PROCEDURE — 78580 LUNG PERFUSION IMAGING: CPT

## 2024-01-18 PROCEDURE — 93306 TTE W/DOPPLER COMPLETE: CPT

## 2024-01-18 PROCEDURE — 87081 CULTURE SCREEN ONLY: CPT | Mod: PARLAB | Performed by: INTERNAL MEDICINE

## 2024-01-18 PROCEDURE — 99223 1ST HOSP IP/OBS HIGH 75: CPT

## 2024-01-18 PROCEDURE — 87449 NOS EACH ORGANISM AG IA: CPT | Mod: PARLAB | Performed by: INTERNAL MEDICINE

## 2024-01-18 PROCEDURE — 2500000004 HC RX 250 GENERAL PHARMACY W/ HCPCS (ALT 636 FOR OP/ED): Performed by: INTERNAL MEDICINE

## 2024-01-18 PROCEDURE — 84145 PROCALCITONIN (PCT): CPT | Mod: PARLAB | Performed by: INTERNAL MEDICINE

## 2024-01-18 PROCEDURE — 83010 ASSAY OF HAPTOGLOBIN QUANT: CPT | Performed by: STUDENT IN AN ORGANIZED HEALTH CARE EDUCATION/TRAINING PROGRAM

## 2024-01-18 PROCEDURE — 36415 COLL VENOUS BLD VENIPUNCTURE: CPT

## 2024-01-18 PROCEDURE — 82947 ASSAY GLUCOSE BLOOD QUANT: CPT

## 2024-01-18 PROCEDURE — 83540 ASSAY OF IRON: CPT

## 2024-01-18 PROCEDURE — 99233 SBSQ HOSP IP/OBS HIGH 50: CPT

## 2024-01-18 PROCEDURE — 84100 ASSAY OF PHOSPHORUS: CPT

## 2024-01-18 PROCEDURE — 84466 ASSAY OF TRANSFERRIN: CPT | Mod: PARLAB

## 2024-01-18 PROCEDURE — A9540 TC99M MAA: HCPCS | Performed by: STUDENT IN AN ORGANIZED HEALTH CARE EDUCATION/TRAINING PROGRAM

## 2024-01-18 PROCEDURE — 2500000002 HC RX 250 W HCPCS SELF ADMINISTERED DRUGS (ALT 637 FOR MEDICARE OP, ALT 636 FOR OP/ED): Performed by: STUDENT IN AN ORGANIZED HEALTH CARE EDUCATION/TRAINING PROGRAM

## 2024-01-18 PROCEDURE — 84156 ASSAY OF PROTEIN URINE: CPT

## 2024-01-18 PROCEDURE — 82728 ASSAY OF FERRITIN: CPT | Mod: PARLAB | Performed by: STUDENT IN AN ORGANIZED HEALTH CARE EDUCATION/TRAINING PROGRAM

## 2024-01-18 PROCEDURE — 71250 CT THORAX DX C-: CPT | Performed by: RADIOLOGY

## 2024-01-18 RX ORDER — FUROSEMIDE 10 MG/ML
20 INJECTION INTRAMUSCULAR; INTRAVENOUS ONCE
Status: DISCONTINUED | OUTPATIENT
Start: 2024-01-18 | End: 2024-01-18

## 2024-01-18 RX ORDER — BUDESONIDE 0.5 MG/2ML
0.5 INHALANT ORAL
Status: DISCONTINUED | OUTPATIENT
Start: 2024-01-18 | End: 2024-01-23 | Stop reason: HOSPADM

## 2024-01-18 RX ORDER — PANTOPRAZOLE SODIUM 40 MG/1
40 TABLET, DELAYED RELEASE ORAL
Status: DISCONTINUED | OUTPATIENT
Start: 2024-01-19 | End: 2024-01-23 | Stop reason: HOSPADM

## 2024-01-18 RX ORDER — FUROSEMIDE 20 MG/1
20 TABLET ORAL 2 TIMES DAILY
Status: DISCONTINUED | OUTPATIENT
Start: 2024-01-18 | End: 2024-01-18

## 2024-01-18 RX ORDER — FUROSEMIDE 10 MG/ML
20 INJECTION INTRAMUSCULAR; INTRAVENOUS EVERY 12 HOURS
Status: DISCONTINUED | OUTPATIENT
Start: 2024-01-18 | End: 2024-01-20

## 2024-01-18 RX ADMIN — BUDESONIDE INHALATION 0.5 MG: 0.5 SUSPENSION RESPIRATORY (INHALATION) at 18:39

## 2024-01-18 RX ADMIN — LEVOTHYROXINE SODIUM 75 MCG: 0.07 TABLET ORAL at 11:14

## 2024-01-18 RX ADMIN — AZITHROMYCIN 500 MG: 500 INJECTION, POWDER, LYOPHILIZED, FOR SOLUTION INTRAVENOUS at 02:02

## 2024-01-18 RX ADMIN — KIT FOR THE PREPARATION OF TECHNETIUM TC 99M ALBUMIN AGGREGATED 4.1 MILLICURIE: 2.5 INJECTION, POWDER, FOR SOLUTION INTRAVENOUS at 08:30

## 2024-01-18 RX ADMIN — CEFTRIAXONE SODIUM 1 G: 1 INJECTION, SOLUTION INTRAVENOUS at 01:30

## 2024-01-18 RX ADMIN — IPRATROPIUM BROMIDE AND ALBUTEROL SULFATE 3 ML: .5; 3 SOLUTION RESPIRATORY (INHALATION) at 06:28

## 2024-01-18 RX ADMIN — CEFTRIAXONE SODIUM 1 G: 1 INJECTION, SOLUTION INTRAVENOUS at 21:14

## 2024-01-18 RX ADMIN — IRON SUCROSE 300 MG: 20 INJECTION, SOLUTION INTRAVENOUS at 11:22

## 2024-01-18 RX ADMIN — ASPIRIN 81 MG: 81 TABLET, CHEWABLE ORAL at 11:14

## 2024-01-18 RX ADMIN — IPRATROPIUM BROMIDE AND ALBUTEROL SULFATE 3 ML: .5; 3 SOLUTION RESPIRATORY (INHALATION) at 12:28

## 2024-01-18 RX ADMIN — AZITHROMYCIN 500 MG: 500 INJECTION, POWDER, LYOPHILIZED, FOR SOLUTION INTRAVENOUS at 21:50

## 2024-01-18 RX ADMIN — IPRATROPIUM BROMIDE AND ALBUTEROL SULFATE 3 ML: .5; 3 SOLUTION RESPIRATORY (INHALATION) at 18:37

## 2024-01-18 RX ADMIN — METHYLPREDNISOLONE SODIUM SUCCINATE 20 MG: 40 INJECTION, POWDER, FOR SOLUTION INTRAMUSCULAR; INTRAVENOUS at 13:58

## 2024-01-18 RX ADMIN — HEPARIN SODIUM 1000 UNITS/HR: 10000 INJECTION, SOLUTION INTRAVENOUS at 16:30

## 2024-01-18 RX ADMIN — FUROSEMIDE 20 MG: 10 INJECTION, SOLUTION INTRAMUSCULAR; INTRAVENOUS at 16:31

## 2024-01-18 ASSESSMENT — COGNITIVE AND FUNCTIONAL STATUS - GENERAL
CLIMB 3 TO 5 STEPS WITH RAILING: TOTAL
MOVING TO AND FROM BED TO CHAIR: A LITTLE
TOILETING: A LOT
WALKING IN HOSPITAL ROOM: A LITTLE
HELP NEEDED FOR BATHING: A LOT
STANDING UP FROM CHAIR USING ARMS: A LITTLE
WALKING IN HOSPITAL ROOM: A LITTLE
MOVING TO AND FROM BED TO CHAIR: A LITTLE
MOBILITY SCORE: 21
HELP NEEDED FOR BATHING: A LITTLE
STANDING UP FROM CHAIR USING ARMS: A LITTLE
DAILY ACTIVITIY SCORE: 24
DRESSING REGULAR LOWER BODY CLOTHING: A LITTLE
TOILETING: A LITTLE
MOVING TO AND FROM BED TO CHAIR: A LITTLE
STANDING UP FROM CHAIR USING ARMS: A LITTLE
DAILY ACTIVITIY SCORE: 22
DRESSING REGULAR UPPER BODY CLOTHING: A LITTLE
DAILY ACTIVITIY SCORE: 18
MOBILITY SCORE: 18
WALKING IN HOSPITAL ROOM: A LITTLE

## 2024-01-18 ASSESSMENT — PAIN SCALES - GENERAL
PAINLEVEL_OUTOF10: 0 - NO PAIN

## 2024-01-18 ASSESSMENT — PAIN - FUNCTIONAL ASSESSMENT
PAIN_FUNCTIONAL_ASSESSMENT: 0-10
PAIN_FUNCTIONAL_ASSESSMENT: 0-10

## 2024-01-18 NOTE — CARE PLAN
Problem: Skin  Goal: Decreased wound size/increased tissue granulation at next dressing change  1/18/2024 1213 by Angie Carrasco RN  Outcome: Progressing  Flowsheets (Taken 1/18/2024 1213)  Decreased wound size/increased tissue granulation at next dressing change: Promote sleep for wound healing  1/18/2024 1212 by Angie Carrasco RN  Outcome: Progressing  Goal: Participates in plan/prevention/treatment measures  1/18/2024 1213 by Angie Carrasco RN  Outcome: Progressing  Flowsheets (Taken 1/18/2024 1213)  Participates in plan/prevention/treatment measures: Elevate heels  1/18/2024 1212 by Angie Carrasco RN  Outcome: Progressing  Goal: Prevent/manage excess moisture  1/18/2024 1213 by Angie Carrasco RN  Outcome: Progressing  Flowsheets (Taken 1/18/2024 1213)  Prevent/manage excess moisture: Moisturize dry skin  1/18/2024 1212 by Angie Carrasco RN  Outcome: Progressing  Goal: Prevent/minimize sheer/friction injuries  1/18/2024 1213 by Angie Carrasco RN  Flowsheets (Taken 1/18/2024 1213)  Prevent/minimize sheer/friction injuries:   HOB 30 degrees or less   Turn/reposition every 2 hours/use positioning/transfer devices  1/18/2024 1212 by Angie Carrasco RN  Outcome: Progressing  Goal: Promote/optimize nutrition  1/18/2024 1213 by Angie Carrasco RN  Outcome: Progressing  Flowsheets (Taken 1/18/2024 1213)  Promote/optimize nutrition: Monitor/record intake including meals  1/18/2024 1212 by Angie Carrasco RN  Outcome: Progressing  Goal: Promote skin healing  1/18/2024 1213 by Angie Carrasco RN  Outcome: Progressing  Flowsheets (Taken 1/18/2024 1213)  Promote skin healing: Turn/reposition every 2 hours/use positioning/transfer devices  1/18/2024 1212 by Angie Carrasco RN  Outcome: Progressing   The patient's goals for the shift include      The clinical goals for the shift include Have pain at a tolerable level

## 2024-01-18 NOTE — PROGRESS NOTES
Occupational Therapy    Evaluation    Patient Name: Lupe Oshea  MRN: [id number]  Today's Date: 1/18/2024  Time Calculation  Start Time: 1134  Stop Time: 1158  Time Calculation (min): 24 min    Assessment  IP OT Assessment  OT Assessment: Patient would benefit from further OT to address ADL's and functional transfers/mobility due to decline in baseline function.  Hospital admit 1/17/2024 with confusion for the last week following a mechanical fall.  Medical team assessment:   #Acute Hypoxic Respiratory Failure #Concerns for PE with elevated D-dimer #Possible Pneumonia - Patient has new O2 requiring 6L NC, wean as tolerated. - Patient started on heparin gtt for concerns for PE, will follow-up V/Q scan - Consider CT PE with improvement in kidney function - CXR showed LLL infiltrates and history consistent with infection, will treat for CAP, will follow-up CT chest. - Pulmonology consulted for above findings/concerns. - Scheduled and PRN duonebs #Elevated Troponin, no chest pain #Concerns for new CHF #Concerns for possible NSTEMI  #Acute Anemia #Transaminitis  Prognosis: Good  End of Session Communication: Bedside nurse  End of Session Patient Position: Bed, 2 rail up, Alarm on    Plan:  Treatment Interventions: ADL retraining, Functional transfer training, Endurance training, Compensatory technique education, Patient/family training, Equipment evaluation/education  OT Frequency: 3 times per week  OT Discharge Recommendations: Low intensity level of continued care  OT - OK to Discharge: Yes (to next level of care when medically cleared by medical team)    Subjective   Current Problem:  1. AMS (altered mental status)        2. NSTEMI (non-ST elevated myocardial infarction) (CMS/HCC)  Transthoracic Echo (TTE) Complete    Transthoracic Echo (TTE) Complete      3. Deep vein thrombosis (DVT) of distal vein of lower extremity, unspecified chronicity, unspecified laterality (CMS/HCC)  Lower extremity venous duplex  bilateral    Lower extremity venous duplex bilateral      4. Leg swelling  Lower extremity venous duplex bilateral    Lower extremity venous duplex bilateral      5. Ventricular bigeminy        6. Acute hypoxic respiratory failure (CMS/HCC)        7. Chronic obstructive pulmonary disease with acute exacerbation (CMS/HCC)        8. Congestive heart failure, unspecified HF chronicity, unspecified heart failure type (CMS/HCC)          General:  General  Reason for Referral: ADL  Referred By: Tom Lorenzo MD  Past Medical History Relevant to Rehab: HTN, HLD, hypothyroidism, CKD Stage III, T2DM  Prior to Session Communication: Bedside nurse  Patient Position Received: Bed, 2 rail up  General Comment: Patient seen in room 603; cooperative, motivated    Precautions:  Medical Precautions: Fall precautions, Oxygen therapy device and L/min  Precautions Comment: 5L o2 mask    Vital Signs:  SpO2:  (SpO2 steady above 93% with activity; RN informed)    Pain:  Pain Assessment  Pain Assessment: 0-10  Pain Score: 0 - No pain    Objective   Cognition:  Overall Cognitive Status: Within Functional Limits (however severe [place])     Home Living:  Type of Home: House  Lives With:  (son who works from home)  Home Layout: One level  Home Access: Stairs to enter with rails (2)     Prior Function:  Level of [place]: Independent with ADLs and functional transfers, Independent with homemaking with ambulation  Hand Dominance: Right  Prior Function Comments: drives/shops except winter month when son does shopping     ADL:  ADL Comments: anticipate carryover assist for self-care including lower body dressing, bathing, toileting.  Would benefit from further addressing in OT sessions with use of ADL adaptive equipment/assistive techniques as needed to facilitate indep and ease in performance.    Activity Tolerance:  Activity Tolerance Comments: fair (+)/good (-) overall    Bed Mobility/Transfers: Bed Mobility  Bed Mobility: Yes  Bed Mobility  1  Bed Mobility 1: Supine to sitting, Sitting to supine  Level of Assistance 1: Independent  Bed Mobility Comments 1: HOB elevated    Transfers  Transfer: Yes  Transfer 1  Transfer From 1: Sit to, Stand to  Transfer to 1: Bed  Transfer Level of Assistance 1: Minimal verbal cues (SBA)    Ambulation/Gait Training:  Ambulation/Gait Training  Ambulation/Gait Training Performed: Yes  Ambulation/Gait Training 1  Device 1: No device  Assistance 1: Contact guard, Minimal verbal cues    Sitting Balance:  Static Sitting Balance  Static Sitting-Level of Assistance: Independent  Standing Balance:  Static Standing Balance  Static Standing-Level of Assistance: Contact guard (good (-))    Sensation:  Light Touch: No apparent deficits     Extremities: RUE   RUE : Within Functional Limits and LUE   LUE: Within Functional Limits    Outcome Measures: [facility] Daily Activity  Putting on and taking off regular lower body clothing: A little  Bathing (including washing, rinsing, drying): A lot  Putting on and taking off regular upper body clothing: A little  Toileting, which includes using toilet, bedpan or urinal: A lot  Taking care of personal grooming such as brushing teeth: None  Eating Meals: None  Daily Activity - Total Score: 18    Education Documentation  Body Mechanics, taught by Archana Loera OT at 1/18/2024  1:45 PM.  Learner: Patient  Readiness: Acceptance  Method: Explanation  Response: Verbalizes Understanding, Demonstrated Understanding, Needs Reinforcement  Comment: mobility techniques to promote safety    Goals:   Encounter Problems       Encounter Problems (Active)       OT Goals       OT Goal 1 (Progressing)       Start:  01/18/24    Expected End:  01/25/24       Patient with complete upper and lower body bathing/dressing; toileting with modified independence using adaptive equipment as needed           OT Goal 2 (Progressing)       Start:  01/18/24    Expected End:  01/25/24       Patient will perform bed mobility and  functional transfers safely and independently: bed, chair, commode using DME as needed           OT Goal 3 (Progressing)       Start:  01/18/24    Expected End:  01/25/24       Patient will apply energy conservation/diaphragmatic breathing techniques to ADL's and functional transfers with minimal cues           OT Goal 4 (Progressing)       Start:  01/18/24    Expected End:  01/25/24       Patient will tolerate standing for 5 mins. and show overall good standing balance during ADL's and functional transfers/mobility

## 2024-01-18 NOTE — CONSULTS
Assessment and Plan  Patient is 83 y.o. female  with the following medical Problems:  Left lower lobe pneumonia  Acute hypoxic respiratory failure requiring supplemental oxygen  COPD with mild acute exacerbation.  Left lower lobe atelectasis  Bilateral small pleural effusion  Secondary pulmonary hypertension  Scattered pulmonary nodules, largest measuring up to 1.1 cm in the lingula.  NSTEMI  Heart failure with moderately reduced ejection fraction      Plan of Care:  Continue with ceftriaxone and azithromycin  Pneumonia workup including Legionella urinary antigen, strep urinary antigen, mycoplasma IgM, procalcitonin level, MRSA swab.  CT scan findings are suggestive of small left lower lobe pneumonia with small bilateral pleural effusions however patient has CT scan findings just of of COPD  Scheduled Solu-Medrol with scheduled DuoNeb and Pulmicort.  Effusions are small.  No indication for thoracentesis at this stage.  If effusions worsen will consider thoracentesis.  Patient is currently full anticoagulation with heparin drip for NSTEMI covers for DVT prophylaxis.  GI prophylaxis with Protonix.  Incentive spirometer to improve atelectasis.  Strictly avoid benzodiazepine and opioid.      History of Present Illness:      Patient is a 83-year-old woman with HTN, HLD, hypothyroidism, CKD Stage III, T2DM [facility] with confusion for the last week and following a mechanical fall.  Patient's symptoms had been going on for the last 5 days with acute worsening in symptoms over the last 24 hours. During this time the patient has endorses worsening cough, weakness, and confusion. The patient endorses poor PO intake and increased lethargy. She denies any chest pain, shortness of breath, fever or chills during this time. She denies any weight gain or peripheral edema as well. Patient denies any history of clots, MI, or CHF. Last night/this morning, the patient fell after getting up from bed. It was  unwitnessed; however, the patient's Son heard the fall and quickly came to the scene. Patient denies hitting her head. Patient is not on any blood thinners. Family mentions that the time of presenting to the ED the patient had been more altered and not like herself than she had been the following week/     In the ED, vitals were significant for HR 46, SpO2 77% on RA, /53. Patient was placed on 6L NC with improvement in hypoxia to 94%. Labs significant for K 5.4, Cr 2.12 (from 1.06), , , BNP 2731, Trop 556 -> 769, d-dimer 1748, Hgb 10.4 (from 14.5). CXR showed fibrosis and/or mild or early congestion and left lower lung atelectasis or infiltrate. Patient was started on heparin gtt in ED. Patient was also given 2g IV magnesium, 125mg Solu-Medrol, and 40mg IVP lasix in the ED.      PMH: As stated above.   PSH: No previous surgeries on file.  Social: Current smoker, no alcohol use, no illicit drug use  Family: Patient denies any family history of heart disease or clots. She denies personal history of clots  Medications/Allergies: NKDA    Past Surgical History:   Procedure Laterality Date    OTHER SURGICAL HISTORY  07/13/2022    No history of surgery       Family History:   No relevant family history has been documented for this patient.    Allergies:     No Known Allergies      Social history:   reports that she has been smoking cigarettes. She has been smoking an average of .25 packs per day. She has been exposed to tobacco smoke. She has never used smokeless tobacco.    Current Medications:   No recently discontinued medications to reconcile    Review of Systems:   General: denies weight gain, denies loss of appetite, fever, chills, night sweats.  HEENT: denies headaches, dizziness, head trauma, visual changes, eye pain, tinnitus, nosebleeds, hoarseness or throat pain    Respiratory: denies chest pain, +ve dyspnea, cough but no hemoptysis  Cardiovascular: denies orthopnea, paroxysmal nocturnal  dyspnea, leg swelling, and previous heart attack.    Gastrointestinal: denies pain, nausea vomiting, diarrhea, constipation, melena or bleeding.  Genitourinary: denies hematuria, frequency, urgency or dysuria  Neurology: denies syncope, seizures, paralysis, paraesthesia   Endocrine: denies polyuria, polydipsia, skin or hair changes, and heat or cold intolerance  Musculoskeletal: denies joint pain, swelling, arthritis or myalgia  Hematologic: denies bleeding, adenopathy and easy bruising  Skin: denies rashes and skin discoloration  Psychiatry: denies depression    Physical Exam:   Vital Signs:   Vitals:    01/18/24 1228   BP:    Pulse:    Resp:    Temp:    SpO2: 94%       Input/Output:    Intake/Output Summary (Last 24 hours) at 1/18/2024 1305  Last data filed at 1/18/2024 1218  Gross per 24 hour   Intake 238.66 ml   Output --   Net 238.66 ml       Oxygen requirements:    Ventilator Information:  FiO2 (%):  [36 %-44 %] 36 %          General appearance: Not in pain or distress, in no respiratory distress    HEENT: Atraumatic/normocephalic, EOMI, RAMAKRISHNA, pharynx clear, moist mucosa, redness of the uvula appreciated,   Neck: Supple, no jugular venous distension, lymphadenopathy, thyromegaly or carotid bruits  Chest: Decreased breath sounds, +ve faint wheezing, +ve crackles and no tenderness over ribs   Cardiovascular: Normal S1, S2, regular rate and rhythm, no murmur, rub or gallop  Abdomen: Normal sounds present, soft, lax with no tenderness, no hepatosplenomegaly, and no masses  Extremities: No edema. Pulses are equally present.   Skin: intact, no rashes   Neurologic: Alert and oriented x 2-3, No focal deficit     Investigations:  Labs, radiological imaging and cardiac work up were personally reviewed          .       STAFF PHYSICIAN NOTE OF PERSONAL INVOLVEMENT IN CARE  As the attending physician, I certify that I personally reviewed the patient's history and personally examined the patient to confirm the physical  findings described above, and that I reviewed the relevant imaging studies and available reports.  I also discussed the differential diagnosis and all of the proposed management plans with the patient and individuals accompanying the patient to this visit.  They had the opportunity to ask questions about the proposed management plans and to have those questions answered.

## 2024-01-18 NOTE — PROGRESS NOTES
Lupe Oshea is a 83 y.o. female on day 1 of admission presenting with AMS (altered mental status).      Subjective   Seen and examined at bedside.  Currently lying down in the bed.  Alert and oriented x 3.  Comfortable.  Patient is responsive to all questions.  Not in any acute respiratory distress.       Objective     Last Recorded Vitals  BP (!) 104/49 (Patient Position: Lying)   Pulse 78   Temp 36.4 °C (97.5 °F) (Temporal)   Resp 18   Wt 56.2 kg (123 lb 14.4 oz)   SpO2 94%   Intake/Output last 3 Shifts:    Intake/Output Summary (Last 24 hours) at 1/18/2024 1545  Last data filed at 1/18/2024 1455  Gross per 24 hour   Intake 264.83 ml   Output --   Net 264.83 ml       Admission Weight  Weight: 56.2 kg (123 lb 14.4 oz) (01/17/24 1108)    Daily Weight  01/18/24 : 56.2 kg (123 lb 14.4 oz)    Image Results  Transthoracic Echo (TTE) [place] [id number] [phone] and                                  Fax [phone]    TRANSTHORACIC ECHOCARDIOGRAM REPORT       Patient Name:      LUPE OSHEA Reading Physician:    [id number] Faisal Krueger MD  Study Date:        1/18/2024            Ordering Provider:    [id number] DALI JEFFERY  MRN/PID:           [id number]             Fellow:  Accession#:        [id number]         Nurse:  Date of Birth/Age: 1940 / 83 years  Sonographer:          Louise Peres RDCS  Gender:            F                    Additional Staff:  Height:            160.02 cm            Admit Date:           1/17/2024  Weight:            56.25 kg             Admission Status:     Observation -                                                                Priority discharge  BSA:               1.58 m2              Encounter#:            [id number]                                          Department Location:  [facility]  Blood Pressure: 101 /50 mmHg    Study Type:    TRANSTHORACIC ECHO (TTE) COMPLETE  Diagnosis/ICD: Non ST elevation (NSTEMI) myocardial infarction-I21.4  Indication:    Hypoxic  CPT Code:      Echo Complete w Full Doppler-[id number]    Patient History:  Smoker: Current.    Study Detail: The following Echo studies were performed: 2D, M-Mode, Doppler and                color flow.       PHYSICIAN INTERPRETATION:  Left Ventricle: The left ventricular systolic function is mildly decreased, with an estimated ejection fraction of 40-45%. Wall motion is abnormal. The left ventricular cavity size is normal. Spectral Doppler shows a pseudonormal pattern of left ventricular diastolic filling.  LV Wall Scoring:  The entire anterior septum and apex are hypokinetic.    Left Atrium: The left atrium is normal in size.  Right Ventricle: The right ventricle is upper limits of normal in size. There is normal right ventricular global systolic function.  Right Atrium: The right atrium is moderately dilated.  Aortic Valve: The aortic valve is trileaflet. There is mild to moderate aortic valve cusp calcification. There is mild aortic valve thickening. There is evidence of mildly elevated transaortic gradients consistent with sclerosis of the aortic valve.  There is no evidence of aortic valve regurgitation. The peak instantaneous gradient of the aortic valve is 9.7 mmHg.  Mitral Valve: The mitral valve is mildly thickened. There is mild to moderate mitral valve regurgitation.  Tricuspid Valve: The tricuspid valve is structurally normal. There is trace tricuspid regurgitation. The Doppler estimated RVSP is moderately elevated at 50.5 mmHg.  Pulmonic Valve: The pulmonic valve is structurally normal. There is mild pulmonic valve regurgitation.  Pericardium: There is a trivial pericardial effusion.  Pleural: There is a small bilateral pleural  effusion.  Aorta: The aortic root is normal.  Systemic Veins: The inferior vena cava appears dilated. There is less than 50% IVC collapse with inspiration.  In comparison to the previous echocardiogram(s): There are no prior studies on this patient for comparison purposes.       CONCLUSIONS:   1. Left ventricular systolic function is mildly decreased with a 40-45% estimated ejection fraction.   2. Entire anterior septum and apex are abnormal.   3. Spectral Doppler shows a pseudonormal pattern of left ventricular diastolic filling.   4. The right atrium is moderately dilated.   5. Mild to moderate mitral valve regurgitation.   6. Moderately elevated right ventricular systolic pressure.   7. Aortic valve sclerosis.    QUANTITATIVE DATA SUMMARY:  2D MEASUREMENTS:                           Normal Ranges:  Ao Root d:     2.80 cm   (2.0-3.7cm)  LAs:           4.00 cm   (2.7-4.0cm)  IVSd:          0.96 cm   (0.6-1.1cm)  LVPWd:         0.76 cm   (0.6-1.1cm)  LVIDd:         4.54 cm   (3.9-5.9cm)  LVIDs:         2.77 cm  LV Mass Index: 80.6 g/m2  LV % FS        39.0 %    LA VOLUME:                                Normal Ranges:  LA Vol A4C:        40.8 ml    (22+/-6mL/m2)  LA Vol A2C:        42.5 ml  LA Vol BP:         43.4 ml  LA Vol Index A4C:  25.9ml/m2  LA Vol Index A2C:  27.0 ml/m2  LA Vol Index BP:   27.5 ml/m2  LA Area A4C:       15.8 cm2  LA Area A2C:       15.5 cm2  LA Major Axis A4C: 5.2 cm  LA Major Axis A2C: 4.8 cm  LA Volume Index:   26.2 ml/m2  LA Vol A4C:        38.6 ml  LA Vol A2C:        42.5 ml    RA VOLUME BY A/L METHOD:                        Normal Ranges:  RA Area A4C: 17.4 cm2    M-MODE MEASUREMENTS:                   Normal Ranges:  Ao Root: 3.20 cm (2.0-3.7cm)  LAs:     4.10 cm (2.7-4.0cm)    AORTA MEASUREMENTS:                     Normal Ranges:  Asc Ao, d: 2.90 cm (2.1-3.4cm)    LV SYSTOLIC FUNCTION BY 2D PLANIMETRY (MOD):                      Normal Ranges:  EF-A4C View: 61.2 % (>=55%)  EF-A2C View:  58.1 %  EF-Biplane:  59.4 %    LV DIASTOLIC FUNCTION:                                Normal Ranges:  MV Peak A:        0.91 m/s    (0.42-0.7 m/s)  MV lateral e'     0.07 m/s  MV medial e'      0.05 m/s  MV A Dur:         137.00 msec  PulmV Sys Ho:    76.90 cm/s  PulmV Puckett Ho:   46.70 cm/s  PulmV S/D Ho:    1.60  PulmV A Revs Ho: 25.60 cm/s  PulmV A Revs Dur: 106.00 msec    MITRAL VALVE:                  Normal Ranges:  MV DT: 198 msec (150-240msec)    AORTIC VALVE:                          Normal Ranges:  AoV Vmax:      1.56 m/s (<=1.7m/s)  AoV Peak P.7 mmHg (<20mmHg)  LVOT Max Ho:  0.64 m/s (<=1.1m/s)  LVOT VTI:      14.10 cm  LVOT Diameter: 1.80 cm  (1.8-2.4cm)  AoV Area,Vmax: 1.04 cm2 (2.5-4.5cm2)       RIGHT VENTRICLE:  RV Basal 3.81 cm  RV Mid   1.97 cm  RV Major 7.7 cm  TAPSE:   23.7 mm  RV s'    0.11 m/s    TRICUSPID VALVE/RVSP:                              Normal Ranges:  Peak TR Velocity: 2.98 m/s  RV Syst Pressure: 50.5 mmHg (< 30mmHg)  IVC Diam:         2.00 cm    Pulmonary Veins:  PulmV A Revs Dur: 106.00 msec  PulmV A Revs Ho: 25.60 cm/s  PulmV Puckett Ho:   46.70 cm/s  PulmV S/D Ho:    1.60  PulmV Sys Ho:    76.90 cm/s       [id number] Faisal Krueger MD  Electronically signed on 2024 at 12:16:27 PM       Wall Scoring       ** Final **  NM Lung Perfusion  Narrative: Interpreted By:  Cynthia Islas and Maltbie Grace   STUDY:  NM LUNG PERFUSION;  2024 8:54 am      INDICATION:  Signs/Symptoms:SOB, hypoxia, elevated d-dimer.      COMPARISON:  CT chest 2024      ACCESSION NUMBER(S):  [id number]      ORDERING CLINICIAN:  PING POTTS      TECHNIQUE:  DIVISION OF NUCLEAR MEDICINE  PERFUSION LUNG SCANS      Multiple perfusion images of the lungs were acquired after the  intravenous administration of 4.1 mCi of Tc-99m macroaggregated  albumin (MAA). In addition, SPECT of the chest was performed.      FINDINGS:  Perfusion images of both lungs demonstrate mild heterogeneity  throughout the  lung fields bilaterally. No distinct wedge-shaped  subsegmental or segmental perfusion defect seen on SPECT to suggest  acute pulmonary embolism (low probability).      Impression: 1. No distinct wedge-shaped subsegmental or segmental perfusion  defect seen on SPECT to suggest acute pulmonary embolism (low  probability).      The interpretation above is based on modified PIOPED II and PISAPED  criteria.      I personally reviewed the images/study and I agree with the findings  as stated by Nuclear Medicine fellow Sol Gil MD. This study  was interpreted at [place], [place].      Signed by: Cynthia Islas 1/18/2024 10:17 AM  Dictation workstation:   [id number]  CT chest wo IV contrast  Narrative: Interpreted By:  Finkelstein, Evan,   STUDY:  CT CHEST WO IV CONTRAST;  1/18/2024 12:59 am      INDICATION:  Signs/Symptoms:New hypoxia, concern for pneumonia.      COMPARISON:  Chest radiograph 01/17/2024      ACCESSION NUMBER(S):  [id number]      ORDERING CLINICIAN:  PING POTTS      TECHNIQUE:  Axial CT images of the chest obtained  without IV contrast.  Sagittal  and coronal reconstructions were created..      FINDINGS:  CHEST WALL AND LOWER NECK: Within normal limits.  UPPER ABDOMEN: There are vascular calcifications involving the renal  vessels bilaterally along with suspected nonobstructing stones.  Simple appearing cysts in the right kidney. 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      VESSELS: Enlarged main pulmonary artery measures up to 3.3 cm.  Atherosclerotic calcifications in the aorta and coronary arteries.  HEART: Cardiomegaly. No pericardial effusion. MEDIASTINUM AND HERMAN: 1  cm subcarinal lymph node. Prominent lymph nodes scattered elsewhere  throughout the mediastinum as well. LUNG, PLEURA, AND LARGE AIRWAYS:  Moderate left and small right pleural effusions. Thickened  interlobular septal  markings. Patchy left basilar airspace opacity.  Dependent atelectasis. Moderate emphysematous changes scattered  throughout the lungs bilaterally. Ground-glass opacities most  pronounced along the periphery of the lungs. 3 mm pleural-based  nodule along the periphery of the right middle lobe, best seen image  162 of series 202. 1.1 cm nodule in the lingula image 135.      BONES: No acute osseous abnormality.      Impression: Patchy left basilar airspace opacity concerning for pneumonia.  Additional dependent opacities are favored to represent superimposed  atelectasis.      Ground-glass opacities most pronounced along the periphery of the  lungs which may be infectious or inflammatory in etiology.      Moderate left and small right pleural effusions.      Cardiomegaly with thickened interlobular septal markings suggesting  pulmonary interstitial edema.      Enlarged main pulmonary artery measures up to 3.3 cm and may be seen  with pulmonary arterial hypertension.      Prominent nonspecific mediastinal lymph nodes, possibly reactive.      Scattered pulmonary nodules, largest measuring up to 1.1 cm in the  lingula. Recommend follow-up imaging with CT at 3-6 months as per  Fleischner criteria.      MACRO:  None.      Signed by: Evan Finkelstein 1/18/2024 1:30 AM  Dictation workstation:   [id number]  CT head wo IV contrast  Narrative: Interpreted By:  Finkelstein, Evan,   STUDY:  CT HEAD WO IV CONTRAST;  1/18/2024 12:59 am      INDICATION:  Signs/Symptoms:Altered Mental Status.      COMPARISON:  None.      ACCESSION NUMBER(S):  [id number]      ORDERING CLINICIAN:  PING POTTS      TECHNIQUE:  Axial noncontrast CT images of the head with coronal and sagittal  reconstructions.      FINDINGS:  EXTRACRANIAL SOFT TISSUES: Unremarkable.      CALVARIUM: No depressed skull fracture. No destructive osseous lesion.      PARANASAL SINUSES/MASTOIDS: The visualized paranasal sinuses and  mastoid air cells are aerated.       HEMORRHAGE: No acute intracranial hemorrhage.      BRAIN PARENCHYMA: Gray-white matter interfaces are preserved. No mass  effect or midline shift. There are nonspecific scattered white matter  hypodensities.      VENTRICLES and EXTRA-AXIAL SPACES: Parenchymal atrophy with  prominence of the ventricles and cortical sulci.      OTHER FINDINGS: There are calcifications within the cavernous carotids      Impression: No acute intracranial hemorrhage, mass effect or midline shift.      Nonspecific scattered white matter hypodensities favored to represent  sequela of small vessel ischemia.          MACRO:  None.      Signed by: Evan Finkelstein 1/18/2024 1:21 AM  Dictation workstation:   [id number]  Lower extremity venous duplex bilateral  Narrative: Interpreted By:  Jerry Prieto,   STUDY:  [facility] LOWER EXTREMITY VENOUS DUPLEX BILATERAL;  1/18/2024 12:11 am      INDICATION:  Signs/Symptoms:Concerns for PE and DVTs.      COMPARISON:  None.      ACCESSION NUMBER(S):  [id number]      ORDERING CLINICIAN:  PING POTTS      TECHNIQUE:  Grayscale, color and spectral Doppler sonographic images of the  bilateral lower extremity deep venous system.      FINDINGS:  RIGHT: There is normal compressibility of the common femoral vein,  saphenous femoral junction, profunda femoral vein and popliteal vein.  The posterior tibial and peroneal veins  demonstrate normal color  flow and compressibility. There is normal spontaneous and phasic  variation throughout the leg by spectral doppler.      LEFT: There is normal compressibility of the common femoral vein,  saphenous femoral junction, profunda femoral vein and popliteal vein.  The posterior tibial and peroneal veins  are suboptimally visualized.  There is normal spontaneous and phasic variation throughout the leg  by spectral doppler.      OTHER FINDINGS: None.      Impression: Suboptimal visualization of the left calf veins. No sonographic  evidence DVT in the visualized vessels  of bilateral lower extremities.      MACRO:  None      Signed by: Jerry Prieto 1/18/2024 12:32 AM  Dictation workstation:   [id number]      Physical Exam  General:  Pleasant and cooperative. No apparent distress.  HEENT:  Normocephalic, atraumatic, mucus membranes moist.   Neck:  Trachea midline.     Chest: no crackles or significant wheezing  CV:  Regular rate and rhythm.  Positive S1/S2.   Abdomen: Bowel sounds present in all four quadrants, abdomen is soft, non-tender, non-distended.  Extremities:  no lower extremity edema or cyanosis.   Neurological:  AAOx3. No focal deficits.  Skin:  Warm and dry.  Relevant Results  Transthoracic Echo (TTE) Complete    Result Date: 1/18/2024    [facility], [address] [place] [address] [id number] [phone] and                                 Fax [phone] TRANSTHORACIC ECHOCARDIOGRAM REPORT  Patient Name:      ALPHONSO ESPINO Reading Physician:    [id number] Faisal Krueger MD Study Date:        1/18/2024            Ordering Provider:    [id number] DALI JEFFERY MRN/PID:           [id number]             Fellow: Accession#:        [id number]         Nurse: Date of Birth/Age: 1940 / 83 years  Sonographer:          Louise Peres RDCS Gender:            F                    Additional Staff: Height:            160.02 cm            Admit Date:           1/17/2024 Weight:            56.25 kg             Admission Status:     Observation -                                                               Priority discharge BSA:               1.58 m2              Encounter#:           [id number]                                         Department Location:  [facility] Blood Pressure: 101 /50 mmHg Study Type:    TRANSTHORACIC ECHO (TTE) COMPLETE Diagnosis/ICD: Non ST elevation  (NSTEMI) myocardial infarction-I21.4 Indication:    Hypoxic CPT Code:      Echo Complete w Full Doppler-82098 Patient History: Smoker: Current. Study Detail: The following Echo studies were performed: 2D, M-Mode, Doppler and               color flow.  PHYSICIAN INTERPRETATION: Left Ventricle: The left ventricular systolic function is mildly decreased, with an estimated ejection fraction of 40-45%. Wall motion is abnormal. The left ventricular cavity size is normal. Spectral Doppler shows a pseudonormal pattern of left ventricular diastolic filling. LV Wall Scoring: The entire anterior septum and apex are hypokinetic. Left Atrium: The left atrium is normal in size. Right Ventricle: The right ventricle is upper limits of normal in size. There is normal right ventricular global systolic function. Right Atrium: The right atrium is moderately dilated. Aortic Valve: The aortic valve is trileaflet. There is mild to moderate aortic valve cusp calcification. There is mild aortic valve thickening. There is evidence of mildly elevated transaortic gradients consistent with sclerosis of the aortic valve. There is no evidence of aortic valve regurgitation. The peak instantaneous gradient of the aortic valve is 9.7 mmHg. Mitral Valve: The mitral valve is mildly thickened. There is mild to moderate mitral valve regurgitation. Tricuspid Valve: The tricuspid valve is structurally normal. There is trace tricuspid regurgitation. The Doppler estimated RVSP is moderately elevated at 50.5 mmHg. Pulmonic Valve: The pulmonic valve is structurally normal. There is mild pulmonic valve regurgitation. Pericardium: There is a trivial pericardial effusion. Pleural: There is a small bilateral pleural effusion. Aorta: The aortic root is normal. Systemic Veins: The inferior vena cava appears dilated. There is less than 50% IVC collapse with inspiration. In comparison to the previous echocardiogram(s): There are no prior studies on this patient for  comparison purposes.  CONCLUSIONS:  1. Left ventricular systolic function is mildly decreased with a 40-45% estimated ejection fraction.  2. Entire anterior septum and apex are abnormal.  3. Spectral Doppler shows a pseudonormal pattern of left ventricular diastolic filling.  4. The right atrium is moderately dilated.  5. Mild to moderate mitral valve regurgitation.  6. Moderately elevated right ventricular systolic pressure.  7. Aortic valve sclerosis. QUANTITATIVE DATA SUMMARY: 2D MEASUREMENTS:                          Normal Ranges: Ao Root d:     2.80 cm   (2.0-3.7cm) LAs:           4.00 cm   (2.7-4.0cm) IVSd:          0.96 cm   (0.6-1.1cm) LVPWd:         0.76 cm   (0.6-1.1cm) LVIDd:         4.54 cm   (3.9-5.9cm) LVIDs:         2.77 cm LV Mass Index: 80.6 g/m2 LV % FS        39.0 % LA VOLUME:                               Normal Ranges: LA Vol A4C:        40.8 ml    (22+/-6mL/m2) LA Vol A2C:        42.5 ml LA Vol BP:         43.4 ml LA Vol Index A4C:  25.9ml/m2 LA Vol Index A2C:  27.0 ml/m2 LA Vol Index BP:   27.5 ml/m2 LA Area A4C:       15.8 cm2 LA Area A2C:       15.5 cm2 LA Major Axis A4C: 5.2 cm LA Major Axis A2C: 4.8 cm LA Volume Index:   26.2 ml/m2 LA Vol A4C:        38.6 ml LA Vol A2C:        42.5 ml RA VOLUME BY A/L METHOD:                       Normal Ranges: RA Area A4C: 17.4 cm2 M-MODE MEASUREMENTS:                  Normal Ranges: Ao Root: 3.20 cm (2.0-3.7cm) LAs:     4.10 cm (2.7-4.0cm) AORTA MEASUREMENTS:                    Normal Ranges: Asc Ao, d: 2.90 cm (2.1-3.4cm) LV SYSTOLIC FUNCTION BY 2D PLANIMETRY (MOD):                     Normal Ranges: EF-A4C View: 61.2 % (>=55%) EF-A2C View: 58.1 % EF-Biplane:  59.4 % LV DIASTOLIC FUNCTION:                               Normal Ranges: MV Peak A:        0.91 m/s    (0.42-0.7 m/s) MV lateral e'     0.07 m/s MV medial e'      0.05 m/s MV A Dur:         137.00 msec PulmV Sys Ho:    76.90 cm/s PulmV Puckett Ho:   46.70 cm/s PulmV S/D Ho:    1.60 PulmV A  Revs Ho: 25.60 cm/s PulmV A Revs Dur: 106.00 msec MITRAL VALVE:                 Normal Ranges: MV DT: 198 msec (150-240msec) AORTIC VALVE:                         Normal Ranges: AoV Vmax:      1.56 m/s (<=1.7m/s) AoV Peak P.7 mmHg (<20mmHg) LVOT Max Ho:  0.64 m/s (<=1.1m/s) LVOT VTI:      14.10 cm LVOT Diameter: 1.80 cm  (1.8-2.4cm) AoV Area,Vmax: 1.04 cm2 (2.5-4.5cm2)  RIGHT VENTRICLE: RV Basal 3.81 cm RV Mid   1.97 cm RV Major 7.7 cm TAPSE:   23.7 mm RV s'    0.11 m/s TRICUSPID VALVE/RVSP:                             Normal Ranges: Peak TR Velocity: 2.98 m/s RV Syst Pressure: 50.5 mmHg (< 30mmHg) IVC Diam:         2.00 cm Pulmonary Veins: PulmV A Revs Dur: 106.00 msec PulmV A Revs Ho: 25.60 cm/s PulmV Puckett Ho:   46.70 cm/s PulmV S/D Ho:    1.60 PulmV Sys Ho:    76.90 cm/s  [id number] Faisal Krueger MD Electronically signed on 2024 at 12:16:27 PM  Wall Scoring  ** Final **     NM Lung Perfusion    Result Date: 2024  Interpreted By:  Cynthia Islas and Maltbie Grace STUDY: NM LUNG PERFUSION;  2024 8:54 am   INDICATION: Signs/Symptoms:SOB, hypoxia, elevated d-dimer.   COMPARISON: CT chest 2024   ACCESSION NUMBER(S): [id number]   ORDERING CLINICIAN: PING POTTS   TECHNIQUE: DIVISION OF NUCLEAR MEDICINE PERFUSION LUNG SCANS   Multiple perfusion images of the lungs were acquired after the intravenous administration of 4.1 mCi of Tc-99m macroaggregated albumin (MAA). In addition, SPECT of the chest was performed.   FINDINGS: Perfusion images of both lungs demonstrate mild heterogeneity throughout the lung fields bilaterally. No distinct wedge-shaped subsegmental or segmental perfusion defect seen on SPECT to suggest acute pulmonary embolism (low probability).       1. No distinct wedge-shaped subsegmental or segmental perfusion defect seen on SPECT to suggest acute pulmonary embolism (low probability).   The interpretation above is based on modified PIOPED II and PISAPED criteria.   I  personally reviewed the images/study and I agree with the findings as stated by Nuclear Medicine fellow Sol Gil MD. This study was interpreted at [facility], [place], [place].   Signed by: Cynthia Islas 1/18/2024 10:17 AM Dictation workstation:   [id number]    CT chest wo IV contrast    Result Date: 1/18/2024  Interpreted By:  Finkelstein, Evan, STUDY: CT CHEST WO IV CONTRAST;  1/18/2024 12:59 am   INDICATION: Signs/Symptoms:New hypoxia, concern for pneumonia.   COMPARISON: Chest radiograph 01/17/2024   ACCESSION NUMBER(S): [id number]   ORDERING CLINICIAN: PING POTTS   TECHNIQUE: Axial CT images of the chest obtained  without IV contrast.  Sagittal and coronal reconstructions were created..   FINDINGS: CHEST WALL AND LOWER NECK: Within normal limits. UPPER ABDOMEN: There are vascular calcifications involving the renal vessels bilaterally along with suspected nonobstructing stones. Simple appearing cysts in the right kidney. Right suprarenal hypodensity measuring approximately 1.7 cm favored to represent an adrenal adenoma.   VESSELS: Enlarged main pulmonary artery measures up to 3.3 cm. Atherosclerotic calcifications in the aorta and coronary arteries. HEART: Cardiomegaly. No pericardial effusion. MEDIASTINUM AND HERMAN: 1 cm subcarinal lymph node. Prominent lymph nodes scattered elsewhere throughout the mediastinum as well. LUNG, PLEURA, AND LARGE AIRWAYS: Moderate left and small right pleural effusions. Thickened interlobular septal markings. Patchy left basilar airspace opacity. Dependent atelectasis. Moderate emphysematous changes scattered throughout the lungs bilaterally. Ground-glass opacities most pronounced along the periphery of the lungs. 3 mm pleural-based nodule along the periphery of the right middle lobe, best seen image 162 of series 202. 1.1 cm nodule in the lingula image 135.   BONES: No acute osseous abnormality.       Patchy left basilar airspace opacity  concerning for pneumonia. Additional dependent opacities are favored to represent superimposed atelectasis.   Ground-glass opacities most pronounced along the periphery of the lungs which may be infectious or inflammatory in etiology.   Moderate left and small right pleural effusions.   Cardiomegaly with thickened interlobular septal markings suggesting pulmonary interstitial edema.   Enlarged main pulmonary artery measures up to 3.3 cm and may be seen with pulmonary arterial hypertension.   Prominent nonspecific mediastinal lymph nodes, possibly reactive.   Scattered pulmonary nodules, largest measuring up to 1.1 cm in the lingula. Recommend follow-up imaging with CT at 3-6 months as per Fleischner criteria.   MACRO: None.   Signed by: Evan Finkelstein 1/18/2024 1:30 AM Dictation workstation:   [id number]    CT head wo IV contrast    Result Date: 1/18/2024  Interpreted By:  Finkelstein, Evan, STUDY: CT HEAD WO IV CONTRAST;  1/18/2024 12:59 am   INDICATION: Signs/Symptoms:Altered Mental Status.   COMPARISON: None.   ACCESSION NUMBER(S): [id number]   ORDERING CLINICIAN: PING POTTS   TECHNIQUE: Axial noncontrast CT images of the head with coronal and sagittal reconstructions.   FINDINGS: EXTRACRANIAL SOFT TISSUES: Unremarkable.   CALVARIUM: No depressed skull fracture. No destructive osseous lesion.   PARANASAL SINUSES/MASTOIDS: The visualized paranasal sinuses and mastoid air cells are aerated.   HEMORRHAGE: No acute intracranial hemorrhage.   BRAIN PARENCHYMA: Gray-white matter interfaces are preserved. No mass effect or midline shift. There are nonspecific scattered white matter hypodensities.   VENTRICLES and EXTRA-AXIAL SPACES: Parenchymal atrophy with prominence of the ventricles and cortical sulci.   OTHER FINDINGS: There are calcifications within the cavernous carotids       No acute intracranial hemorrhage, mass effect or midline shift.   Nonspecific scattered white matter hypodensities favored to  represent sequela of small vessel ischemia.     MACRO: None.   Signed by: Evan Finkelstein 1/18/2024 1:21 AM Dictation workstation:   [id number]    Lower extremity venous duplex bilateral    Result Date: 1/18/2024  Interpreted By:  Jerry Prieto, STUDY: [facility] LOWER EXTREMITY VENOUS DUPLEX BILATERAL;  1/18/2024 12:11 am   INDICATION: Signs/Symptoms:Concerns for PE and DVTs.   COMPARISON: None.   ACCESSION NUMBER(S): [id number]   ORDERING CLINICIAN: PING POTTS   TECHNIQUE: Grayscale, color and spectral Doppler sonographic images of the bilateral lower extremity deep venous system.   FINDINGS: RIGHT: There is normal compressibility of the common femoral vein, saphenous femoral junction, profunda femoral vein and popliteal vein. The posterior tibial and peroneal veins  demonstrate normal color flow and compressibility. There is normal spontaneous and phasic variation throughout the leg by spectral doppler.   LEFT: There is normal compressibility of the common femoral vein, saphenous femoral junction, profunda femoral vein and popliteal vein. The posterior tibial and peroneal veins  are suboptimally visualized. There is normal spontaneous and phasic variation throughout the leg by spectral doppler.   OTHER FINDINGS: None.       Suboptimal visualization of the left calf veins. No sonographic evidence DVT in the visualized vessels of bilateral lower extremities.   MACRO: None   Signed by: Jerry Prieto 1/18/2024 12:32 AM Dictation workstation:   [id number]    XR chest 1 view    Result Date: 1/17/2024  Interpreted By:  Ronni Noel, STUDY: XR CHEST 1 VIEW;  1/17/2024 3:21 pm   INDICATION: Signs/Symptoms:fatigue/SOB.   COMPARISON: None.   ACCESSION NUMBER(S): [id number]   ORDERING CLINICIAN: SERAFIN RAO   FINDINGS: CARDIOMEDIASTINAL SILHOUETTE AND VASCULATURE:   Cardiac size:  Mild cardiomegaly   Aortic shadow:  Within normal limits.   Mediastinal contours: Within normal limits.   Pulmonary vasculature:  Mild  cephalization of the pulmonary blood flow suggesting mild congestion.   LUNGS: There is mild interstitial prominence probably accentuated from a somewhat shallow inspiration, but may be due to mild or early congestion. Slight opacity left lower lung is probably from atelectasis and/or fibrosis, or possibly focal infiltrate. There is blunting at the costophrenic angle that may be from a small effusion. Follow-up as clinically warranted.   ABDOMEN AND OTHER FINDINGS: No remarkable upper abdominal findings.   BONES: No acute osseous changes.       1.  Fibrosis and/or mild or early congestion and left lower lung atelectasis or infiltrate.   Signed by: Ronni Noel 1/17/2024 3:33 PM Dictation workstation:   [id number]    Results for orders placed or performed during the hospital encounter of 01/17/24 (from the past 24 hour(s))   Troponin I, High Sensitivity   Result Value Ref Range    Troponin I, High Sensitivity 769 (HH) 0 - 13 ng/L   Lactate   Result Value Ref Range    Lactate 1.4 0.4 - 2.0 mmol/L   Urinalysis with Reflex Culture and Microscopic   Result Value Ref Range    Color, Urine Camila (N) Straw, Yellow    Appearance, Urine Hazy (N) Clear    Specific Gravity, Urine 1.011 1.005 - 1.035    pH, Urine 5.0 5.0, 5.5, 6.0, 6.5, 7.0, 7.5, 8.0    Protein, Urine 100 (2+) (N) NEGATIVE mg/dL    Glucose, Urine NEGATIVE NEGATIVE mg/dL    Blood, Urine LARGE (3+) (A) NEGATIVE    Ketones, Urine NEGATIVE NEGATIVE mg/dL    Bilirubin, Urine NEGATIVE NEGATIVE    Urobilinogen, Urine <2.0 <2.0 mg/dL    Nitrite, Urine NEGATIVE NEGATIVE    Leukocyte Esterase, Urine NEGATIVE NEGATIVE   Extra Urine Gray Tube   Result Value Ref Range    Extra Tube Hold for add-ons.    Urinalysis Microscopic   Result Value Ref Range    WBC, Urine 1-5 1-5, NONE /HPF    RBC, Urine >20 (A) NONE, 1-2, 3-5 /HPF    Squamous Epithelial Cells, Urine 1-9 (SPARSE) Reference range not established. /HPF    Bacteria, Urine 1+ (A) NONE SEEN /HPF   Troponin I, High  Sensitivity   Result Value Ref Range    Troponin I, High Sensitivity 663 (HH) 0 - 13 ng/L   Basic metabolic panel   Result Value Ref Range    Glucose 162 (H) 74 - 99 mg/dL    Sodium 139 136 - 145 mmol/L    Potassium 4.3 3.5 - 5.3 mmol/L    Chloride 99 98 - 107 mmol/L    Bicarbonate 29 21 - 32 mmol/L    Anion Gap 15 10 - 20 mmol/L    Urea Nitrogen 51 (H) 6 - 23 mg/dL    Creatinine 1.85 (H) 0.50 - 1.05 mg/dL    eGFR 27 (L) >60 mL/min/1.73m*2    Calcium 7.8 (L) 8.6 - 10.3 mg/dL   Lipid panel   Result Value Ref Range    Cholesterol 123 0 - 199 mg/dL    HDL-Cholesterol 47.6 mg/dL    Cholesterol/HDL Ratio 2.6     LDL Calculated 67 <=99 mg/dL    VLDL 8 0 - 40 mg/dL    Triglycerides 41 0 - 149 mg/dL    Non HDL Cholesterol 75 0 - 149 mg/dL   Heparin Assay, UFH   Result Value Ref Range    Heparin Unfractionated 0.7 See Comment Below for Therapeutic Ranges IU/mL   Type and screen   Result Value Ref Range    ABO TYPE O     Rh TYPE POS     ANTIBODY SCREEN NEG    POCT GLUCOSE   Result Value Ref Range    POCT Glucose 158 (H) 74 - 99 mg/dL   Lavender Top   Result Value Ref Range    Extra Tube Hold for add-ons.    Streptococcus pneumoniae Antigen, Urine    Specimen: Urine   Result Value Ref Range    Streptococcus pneumoniae Ag, Urine Negative Negative   Legionella Antigen, Urine    Specimen: Urine   Result Value Ref Range    L. pneumophila Urine Ag Negative Negative   Magnesium   Result Value Ref Range    Magnesium 2.63 (H) 1.60 - 2.40 mg/dL   Hepatic function panel   Result Value Ref Range    Albumin 3.4 3.4 - 5.0 g/dL    Bilirubin, Total 0.3 0.0 - 1.2 mg/dL    Bilirubin, Direct 0.1 0.0 - 0.3 mg/dL    Alkaline Phosphatase 65 33 - 136 U/L     (H) 7 - 45 U/L    AST 69 (H) 9 - 39 U/L    Total Protein 5.5 (L) 6.4 - 8.2 g/dL   Phosphorus   Result Value Ref Range    Phosphorus 6.0 (H) 2.5 - 4.9 mg/dL   Iron and TIBC   Result Value Ref Range    Iron <10 (L) 35 - 150 ug/dL    UIBC 369 110 - 370 ug/dL    TIBC      % Saturation      Transferrin   Result Value Ref Range    Transferrin 298 200 - 360 mg/dL   Lactate dehydrogenase   Result Value Ref Range     84 - 246 U/L   Heparin Assay, UFH   Result Value Ref Range    Heparin Unfractionated 0.5 See Comment Below for Therapeutic Ranges IU/mL   CBC   Result Value Ref Range    WBC 8.3 4.4 - 11.3 x10*3/uL    nRBC 0.4 (H) 0.0 - 0.0 /100 WBCs    RBC 3.69 (L) 4.00 - 5.20 x10*6/uL    Hemoglobin 10.0 (L) 12.0 - 16.0 g/dL    Hematocrit 32.8 (L) 36.0 - 46.0 %    MCV 89 80 - 100 fL    MCH 27.1 26.0 - 34.0 pg    MCHC 30.5 (L) 32.0 - 36.0 g/dL    RDW 15.5 (H) 11.5 - 14.5 %    Platelets 243 150 - 450 x10*3/uL   Ferritin   Result Value Ref Range    Ferritin 11 8 - 150 ng/mL   SST TOP   Result Value Ref Range    Extra Tube Hold for add-ons.    Lavender Top   Result Value Ref Range    Extra Tube Hold for add-ons.    Lavender Top   Result Value Ref Range    Extra Tube Hold for add-ons.    CBC   Result Value Ref Range    WBC 9.1 4.4 - 11.3 x10*3/uL    nRBC 0.2 (H) 0.0 - 0.0 /100 WBCs    RBC 3.53 (L) 4.00 - 5.20 x10*6/uL    Hemoglobin 9.6 (L) 12.0 - 16.0 g/dL    Hematocrit 32.0 (L) 36.0 - 46.0 %    MCV 91 80 - 100 fL    MCH 27.2 26.0 - 34.0 pg    MCHC 30.0 (L) 32.0 - 36.0 g/dL    RDW 15.6 (H) 11.5 - 14.5 %    Platelets 270 150 - 450 x10*3/uL   SST TOP   Result Value Ref Range    Extra Tube Hold for add-ons.    SST TOP   Result Value Ref Range    Extra Tube Hold for add-ons.    Lavender Top   Result Value Ref Range    Extra Tube Hold for add-ons.    Lavender Top   Result Value Ref Range    Extra Tube Hold for add-ons.    SST TOP   Result Value Ref Range    Extra Tube Hold for add-ons.    Transthoracic Echo (TTE) Complete   Result Value Ref Range    AV pk lew 1.56     LVOT diam 1.80     LV biplane EF 59     LA vol index A/L 27.5     Tricuspid annular plane systolic excursion 2.4     RV free wall pk S' 11.10     LVIDd 4.54     RVSP 50.5     Aortic Valve Area by Continuity of Peak Velocity 1.04     AV pk grad  9.7     LV A4C EF 61.2     Scheduled medications  [Held by provider] amLODIPine, 2.5 mg, oral, Daily  aspirin, 81 mg, oral, Daily  azithromycin, 500 mg, intravenous, q24h  budesonide, 0.5 mg, nebulization, BID  cefTRIAXone, 1 g, intravenous, q24h  furosemide, 20 mg, intravenous, Once  insulin lispro, 0-5 Units, subcutaneous, TID  ipratropium-albuteroL, 3 mL, nebulization, TID  iron sucrose, 300 mg, intravenous, Daily  levothyroxine, 75 mcg, oral, Daily  methylPREDNISolone sodium succinate (PF), 20 mg, intravenous, q12h  [START ON 1/19/2024] pantoprazole, 40 mg, oral, Daily before breakfast  polyethylene glycol, 17 g, oral, Daily      Continuous medications  heparin, 0-4,500 Units/hr, Last Rate: 1,000 Units/hr (01/18/24 1455)      PRN medications  PRN medications: acetaminophen **OR** acetaminophen, dextrose 10 % in water (D10W), dextrose, glucagon, heparin, ipratropium-albuteroL        Assessment/Plan    Susannah Oshea is a 83-year-old female with past medical history of HTN, HLD, hypothyroid, CKD 3, type II DM, presented to hospital with 1 week history of confusion and a mechanical fall.  She is being managed to medical service for evaluation management of acute hypoxic respiratory failure and other multiple medical issues.    # Acute hypoxic respiratory failure  # Left lower lobe pneumonia  # Left lower lobe atelectasis  # Acute exacerbation of COPD  # Scattered pulmonary nodules  - We will continue treatment of pneumonia with Rocephin and azithromycin.  CT chest showed findings confirming small left lower lobe pneumonia with small bilateral pleural effusions.  Pulmonology is following.  Patient likely has a COPD exacerbation secondary to pneumonia.  We will continue with IV Solu-Medrol along with respiratory bronchopulmonary hygiene along with DuoNebs, Pulmicort as advised by pulm.  Pulm team advised no indication for thoracentesis at this stage.  If the effusions worsen they will consider thoracentesis.   Effusions right now are very small.  - We will wait pneumonia workup for Legionella urinary antigen, strep antigen, mycoplasma IgM, procalcitonin level along with MRSA swab.    # NSTEMI  # Elevated troponin  # HFrEF  - Patient did not complain of any central chest pain, however, troponins were elevated at 556 to 769 to 663.  Cardiology is following.  They advised to continue diuresis with IV 20 mg Lasix.  Continue with aspirin and maintain heparin drip for total of 48 hours.  Cardiac catheterization has been deferred to the future.  We will also start patient on high intensity statin once LFTs returned..  Cardiology continues to follow  - Echo today showed 40 to 45% ejection fraction and moderate aortic stenosis    # LAUREN, likely prerenal  # Hyperkalemia-resolved  - We believe that the patient is not clinically volume overloaded.  There is no evidence of peripheral edema and elevated JVD.  As the patient has aortic stenosis findings on echo along with reduced EF, she is preload dependent.  Fattening did improve with one-time push of IV Lasix 20 mg.  We must carefully use IV Lasix at low doses as the patient could suddenly become hypoperfused.  We will give IV Lasix 20 mg twice daily.  - Nephrology following: Want to determine extent of diabetic nephropathy with appropriate tests including urine albumin, protein, creatinine.  They will order renal ultrasound also.  Avoid nephrotoxic agents.  Will maintain strict MAGY's and will fluid restrict 1.5 L.  Continue to monitor BMP.    # Acute anemia  - Patient has had a sudden drop in hemoglobin from her baseline 14.  Iron level is low.  Iron deficient anemia and elderly is CRC until proven otherwise.  We may schedule an outpatient colonoscopy for further investigation.  IV Venofer has been given.  We will continue to monitor hemoglobin.  If hemoglobin less than 7, we will transfuse PRC.  He is also on aspirin and heparin.  Must be careful and continue to monitor.    #  Transaminitis  - This is likely secondary to acute infective process and possibly heart failure.  We will continue to monitor and trend LFTs.    # HTN  # Hypothyroid  # Type II DM  - We will hold amlodipine for now.  Will continue Synthroid.  Will place patient on lispro sliding scale to ensure blood sugars are well-controlled.    - DVT PPx: Currently on heparin drip    Clarisa Varma MD  PGY-1 Internal Medicine

## 2024-01-18 NOTE — CARE PLAN
The patient's goals for the shift include  to be able to have a diet order    The clinical goals for the shift include to be able to breathe better

## 2024-01-18 NOTE — CONSULTS
Inpatient consult to Nephrology  Consult performed by: Carlito Arthur DO  Consult ordered by: Tom Lorenzo MD  Reason for consult: LAUREN on CKD with mild hyperkalemia in setting of possible HF        HPI     Lupe Oshea is a 83 y.o. year old female with a history of HTN, HLD, hypothyroidism, type II DM who presents to [facility] ED on 1/17 with progressively worsening confusion over past week followed by mechanical fall.  Original evaluation done by ED staff, patient was unable to confirm history however during this morning's evaluation she is alert and oriented x 3 however she has difficulties recalling the particulars of her situation.  She confirms that for the past/5 days she did not have anything to eat and had very poor fluid intake.  She states that her current cough is chronic and believes it is secondary to persistent rhinorrhea.  Confirms smoking history and current use while also denying any official pulmonary workup/diagnoses and no home O2 dependence.  Denies sick contacts, fever/chills, muscle aches, NVD.  Says she has regular bowel movements with no change in stool color.  Does admit to hematuria of 2-year duration that she says sometimes turns water pink and sometimes red.  It appears this the first time she has told this to medical professional.    A 10 point ROS was performed with the patient denying any complaint at this time aside from those listed in the HPI above.     In the ED: 98.8 °F, 46 HR with repeat 87 HR, 25 RR, 107/53, 77% room air.  Patient was put on supplemental oxygen.  Venous blood gas 7.37/46/56.  CBC showed anemia 10.4 with normal WBC 8.7.  CMP showing creatinine 2.12, BUN 51, , , BNP 2731, troponin 556, 769, 663.  D-dimer 1700.  UA 3+ blood, 2+ protein.  CT head negative.  CT chest without contrast showing possible concerns for pneumonia with superimposed atelectasis, GGO along periphery with mild bilateral pleural effusions.  Breathing treatments, antibiotics,  "and steroids given in ED and patient started on heparin drip due to elevated D-dimer.  Patient given IV 40 mg Lasix.  Aspirin load administered due to ACS concerns.  Cardiology consulted for heart failure versus ACS rule out.  Nephrology consulted for LAUREN    PMH: as above  PSH: as above  SH: Current smoker, denies EtOH, denies illicit drug use    OBJECTIVE     Vitals:    01/17/24 2118 01/17/24 2158 01/18/24 0400 01/18/24 0628   BP: 106/56  101/50    BP Location:       Pulse: 86  80    Resp: 20      Temp: 36.1 °C (97 °F)  36.3 °C (97.3 °F)    TempSrc:       SpO2: 93%  95% 93%   Weight: 56.2 kg (124 lb) 56.2 kg (124 lb)     Height: 1.6 m (5' 2.99\")         Results from last 7 days   Lab Units 01/18/24  0632 01/18/24  0355 01/17/24  1509   WBC AUTO x10*3/uL 9.1   < > 8.7   HEMOGLOBIN g/dL 9.6*   < > 10.4*   HEMATOCRIT % 32.0*   < > 34.4*   PLATELETS AUTO x10*3/uL 270   < > 296   NEUTROS PCT AUTO %  --   --  76.3   LYMPHS PCT AUTO %  --   --  11.0   MONOS PCT AUTO %  --   --  12.0   EOS PCT AUTO %  --   --  0.0    < > = values in this interval not displayed.     Results from last 7 days   Lab Units 01/18/24  0341 01/17/24  2203   SODIUM mmol/L  --  139   POTASSIUM mmol/L  --  4.3   CHLORIDE mmol/L  --  99   CO2 mmol/L  --  29   BUN mg/dL  --  51*   CREATININE mg/dL  --  1.85*   CALCIUM mg/dL  --  7.8*   PROTEIN TOTAL g/dL 5.5*  --    BILIRUBIN TOTAL mg/dL 0.3  --    ALK PHOS U/L 65  --    ALT U/L 106*  --    AST U/L 69*  --    GLUCOSE mg/dL  --  162*       Scheduled Medications  [Held by provider] amLODIPine, 2.5 mg, oral, Daily  aspirin, 81 mg, oral, Daily  azithromycin, 500 mg, intravenous, q24h  cefTRIAXone, 1 g, intravenous, q24h  insulin lispro, 0-5 Units, subcutaneous, TID  ipratropium-albuteroL, 3 mL, nebulization, TID  iron sucrose, 300 mg, intravenous, Daily  levothyroxine, 75 mcg, oral, Daily  polyethylene glycol, 17 g, oral, Daily       Physical Exam    Constitutional: Well developed, A&Ox3, no acute " distress, alert and cooperative  Eyes: EOMI, clear sclera  Respiratory/Thorax: Bilateral expiratory wheezes with good chest expansion, oxygen supplementation via Venturi mask  Cardiovascular: RRR, no murmurs, 2+ equal pulses of the extremities, no JVD appreciated  Gastrointestinal: Nondistended, soft, non-tender  Extremities: normal extremities, no cyanosis edema, no clubbing  Psychological: Appropriate mood and behavior  Skin: Warm and dry    ASSESSMENT & PLAN     LAUREN, likely prerenal in setting of poor oral intake  Hyperkalemia, resolved  Mildly reduced CHF (LVEF 40-45%)  Moderate aortic stenosis  -Patient does not appear to be volume overload given the absence of peripheral edema and JVD.  However did seem to have improved creatinine with IV Lasix.  This coupled with new echo findings of reduced ejection fraction suggest possibly intravascularly fluid overload.  -Patient's aortic stenosis with reduced EF means that she is preload dependent and predisposed to hypoperfusion with mild decreases in blood pressure.  PLAN:  -Ordered urine albumin, protein, creatinine to determine extent of diabetic nephropathy  -Ordering renal ultrasound to look for evidence of chronicity of possible renal disease.  -Given improvement in creatinine with one-time push IV Lasix will start 20 mg twice daily.  Should be noted that BP should be monitored closely due to presence of aortic stenosis seen on last echo) patient is preload dependent).  -Avoid nephrotoxic agents and hypotension  -Maintain strict I/Os, daily standing weights  -1500 mL fluid restriction  -Trend kidney function with morning labs    Carlito Arthur DO  PGY-1, Internal Medicine  Please SecureChat for any further questions  This is a preliminary note, please await attending attestation for final A/P

## 2024-01-18 NOTE — PROGRESS NOTES
Physical Therapy    Physical Therapy Evaluation    Patient Name: Lupe Oshea  MRN: [id number]  Today's Date: 1/18/2024   Time Calculation  Start Time: 1136  Stop Time: 1158  Time Calculation (min): 22 min    Assessment/Plan   PT Assessment  Rehab Prognosis: Good  End of Session Communication: Bedside nurse, Care Coordinator  End of Session Patient Position: Bed, 2 rail up, Alarm on  IP OR SWING BED PT PLAN  Inpatient or Swing Bed: Inpatient  PT Plan  PT Plan: Skilled PT  PT Frequency: 2 times per week  PT - OK to Discharge: Yes      Subjective   General Visit Information:  General  Reason for Referral: Mech fall,LAUREN,acute hypoxic resp failure,acute anemia,confusion  Referred By: Maura  Past Medical History Relevant to Rehab: htn,hld,dm,ckd,  Prior to Session Communication: Bedside nurse  Patient Position Received: Bed, 2 rail up, Alarm off, not on at start of session  Home Living:  Home Living  Type of Home: House  Lives With: Adult children (son works from home)  Home Layout: One level  Home Access: Stairs to enter without rails (2 stairs)  Prior Level of Function:  Prior Function Per Pt/Caregiver Report  Level of [place]: Independent with ADLs and functional transfers, Independent with homemaking with ambulation  Precautions:  Precautions  Medical Precautions: Cardiac precautions, Fall precautions  Precautions Comment: 5L O2  Vital Signs:       Objective   Pain:  Pain Assessment  Pain Score: 0 - No pain  Cognition:  Cognition  Overall Cognitive Status: Within Functional Limits    General Assessments:    Functional Assessments:  Bed Mobility  Bed Mobility: Yes (ind)    Transfers  Transfer: Yes (SBA)    Ambulation/Gait Training  Ambulation/Gait Training Performed: Yes (CGA 10 ft/no device)  Extremity/Trunk Assessments:    Outcome Measures:  [facility] Basic Mobility  Turning from your back to your side while in a flat bed without using bedrails: None  Moving from lying on your back to sitting on the side of  a flat bed without using bedrails: None  Moving to and from bed to chair (including a wheelchair): A little  Standing up from a chair using your arms (e.g. wheelchair or bedside chair): A little  To walk in hospital room: A little  Climbing 3-5 steps with railing: Total  Basic Mobility - Total Score: 18    Encounter Problems       Encounter Problems (Active)       PT Problem       alejandro       Start:  01/18/24    Expected End:  02/01/24       Ind alejandro         gait       Start:  01/18/24    Expected End:  02/01/24       Amb ind 25 ft. No device or approp device            Pain - Adult              Education Documentation  Mobility Training, taught by Camden Castillo, PT at 1/18/2024  2:50 PM.  Learner: Patient  Readiness: Acceptance  Method: Explanation  Response: Verbalizes Understanding    Body Mechanics, taught by Camden Castillo, PT at 1/18/2024  2:50 PM.  Learner: Patient  Readiness: Acceptance  Method: Explanation  Response: Verbalizes Understanding    Education Comments  No comments found.

## 2024-01-18 NOTE — H&P (VIEW-ONLY)
Inpatient consult to Cardiology  Consult performed by: Hiral Elliott DO  Consult ordered by: Tom Lorenzo MD        Reason For Consult  NSTEMI    History Of Present Illness  Lupe Oshea is a 83 y.o. female with history of hypertension, hyperlipidemia, hypothyroidism, diabetes not on insulin, CKD stage III presented on 1/17 with concerns of weakness worsening for the last few days.  Patient is slightly a poor historian, and feels as though she has been in the hospital longer than last Sunday.  Per report from son, patient had been confused for a week and had a mechanical fall.  Patient reports feeling particularly bad for the last 2 weeks, with worsening cough, possible shortness of breath, lethargy.  Denies recent illness.  Denies chest pain.    Cardiology consulted for further evaluation of NSTEMI.  Patient with soft blood pressures as low as 101/63, heart rate 83.  Saturating greater than 90% on 6 L via Ventimask.  Lab work notable for LAUREN 2.12 (reduced to 1.85 today) (baseline 0.9-1.04), elevated ALT//123, BNP 2731, troponin initial 556, peak at 769, down trended to 663.  Patient repeatedly denying chest pain.  CT chest without contrast significant for patchy left basilar opacity, groundglass opacities mostly pronounced along the periphery of lung, moderate left and small right pleural effusions, enlarged main pulmonary artery measuring up to 3.3 cm, prominent nonspecific mediastinal lymph nodes possibly reactive, scattered pulmonary nodules largest measuring up to 1.1 cm in lingula.  Lung perfusion study showing no distinct wedge-shaped subsegmental or segmental perfusion deficit to suggest acute pulmonary embolism (low probability).  Patient was administered 40 mg of IV Lasix on 1/17.  Loaded with aspirin, and initiated on heparin drip.    Echocardiogram completed 1/18 notable for ejection fraction 40-45%, anterior septal and apex wall abnormal, RA moderately dilated, mid-moderate mitral  valve regurgitation, moderately elevated RVSP, AV sclerosis.    -Social history: Current smoker, no alcohol use or illicit drug use, lives at home with her son  -Family history: Denies any significant history    10 systems reviewed and negative except otherwise noted in HPI above.     Physical Exam  GENERAL: Slightly disgruntled, appears chronically ill, awake/alert/oriented x3, mild distress, slightly cooperative  HEENT: AT/NC, PERRL, EOMI  NECK: Normal Inspection, No JVD, No Lymphadenopathy  CARDIOVASCULAR: RRR, no murmurs, 2+ equal pulses of the extremities, normal S1 and S 2  RESPIRATORY: Decreased breath sounds, good chest expansion, not in acute respiratory distress, no Wheezes, Rales or Rhonchi, on Ventimask  ABDOMEN: Soft, Non-Tender, Normal Bowel Sounds, No Distention  SKIN: Warm and dry  EXTREMITIES: No lower extremity edema  NEURO: No focal deficits, moving all 4 extremities spontaneously  PSYCH: Appropriate, very transiently delirious (reorientable)    Last Recorded Vitals  BP (!) 104/49 (Patient Position: Lying)   Pulse 78   Temp 36.4 °C (97.5 °F) (Temporal)   Resp 18   Wt 56.2 kg (123 lb 14.4 oz)   SpO2 94%      Assessment/Plan   83-year-old female with history of hypertension, hyperlipidemia, hypothyroidism, diabetes not on insulin, CKD stage III presented on 1/17 with concerns of weakness worsening for the last few days, with sudden onset 2 weeks ago.    Echocardiogram completed 1/18 notable for ejection fraction 40-45%, anterior septal and apex wall abnormal, RA moderately dilated, mid-moderate mitral valve regurgitation, moderately elevated RVSP, AV sclerosis.    #NSTEMI  #HFrEF, in exacerbation, new onset  #Moderate left and small right pleural effusions  #Transaminitis, suspect congestive secondary to above    -Patient likely experienced a myocardial infarction 2 weeks ago. Not currently having chest pain, troponins peaked at 769.  Blood pressure slightly soft, however patient with pleural  effusions and on 6 L via Ventimask.  Will cautiously diurese with 20 mg of IV Lasix today.  Agree with aspirin load, and continuing aspirin 81 mg daily.  Please maintain heparin drip for 48 hours.  Patient is eager to be discharged from the hospital, discussed with her potential cardiac catheterization in the future; for now, would optimize her medically prior to interventions - will discuss further with patient and son.  Would like to initiate patient on high intensity statin, however will repeat ALT/AST tomorrow and tentatively start then.      Hiral Elliott, DO  Internal Medicine, PGY-II

## 2024-01-18 NOTE — CARE PLAN
Problem: Skin  Goal: Decreased wound size/increased tissue granulation at next dressing change  Outcome: Progressing  Goal: Participates in plan/prevention/treatment measures  Outcome: Progressing  Goal: Prevent/manage excess moisture  Outcome: Progressing  Goal: Prevent/minimize sheer/friction injuries  Outcome: Progressing  Goal: Promote/optimize nutrition  Outcome: Progressing  Goal: Promote skin healing  Outcome: Progressing     The patient's goals for the shift include      The clinical goals for the shift include Have pain at a tolerable level

## 2024-01-19 PROBLEM — I21.4 NSTEMI (NON-ST ELEVATED MYOCARDIAL INFARCTION) (MULTI): Status: ACTIVE | Noted: 2024-01-17

## 2024-01-19 LAB
ALBUMIN SERPL BCP-MCNC: 3.7 G/DL (ref 3.4–5)
ALBUMIN SERPL BCP-MCNC: 3.7 G/DL (ref 3.4–5)
ALP SERPL-CCNC: 72 U/L (ref 33–136)
ALT SERPL W P-5'-P-CCNC: 100 U/L (ref 7–45)
ANION GAP SERPL CALC-SCNC: 14 MMOL/L (ref 10–20)
ANION GAP SERPL CALC-SCNC: 14 MMOL/L (ref 10–20)
AST SERPL W P-5'-P-CCNC: 39 U/L (ref 9–39)
BILIRUB SERPL-MCNC: 0.3 MG/DL (ref 0–1.2)
BUN SERPL-MCNC: 48 MG/DL (ref 6–23)
BUN SERPL-MCNC: 48 MG/DL (ref 6–23)
C3 SERPL-MCNC: 86 MG/DL (ref 87–200)
C4 SERPL-MCNC: 40 MG/DL (ref 10–50)
CALCIUM SERPL-MCNC: 8.7 MG/DL (ref 8.6–10.3)
CALCIUM SERPL-MCNC: 8.7 MG/DL (ref 8.6–10.3)
CHLORIDE SERPL-SCNC: 102 MMOL/L (ref 98–107)
CHLORIDE SERPL-SCNC: 102 MMOL/L (ref 98–107)
CO2 SERPL-SCNC: 33 MMOL/L (ref 21–32)
CO2 SERPL-SCNC: 33 MMOL/L (ref 21–32)
CREAT SERPL-MCNC: 1.37 MG/DL (ref 0.5–1.05)
CREAT SERPL-MCNC: 1.37 MG/DL (ref 0.5–1.05)
EGFRCR SERPLBLD CKD-EPI 2021: 38 ML/MIN/1.73M*2
EGFRCR SERPLBLD CKD-EPI 2021: 38 ML/MIN/1.73M*2
ERYTHROCYTE [DISTWIDTH] IN BLOOD BY AUTOMATED COUNT: 15.7 % (ref 11.5–14.5)
GLUCOSE BLD MANUAL STRIP-MCNC: 126 MG/DL (ref 74–99)
GLUCOSE BLD MANUAL STRIP-MCNC: 145 MG/DL (ref 74–99)
GLUCOSE BLD MANUAL STRIP-MCNC: 84 MG/DL (ref 74–99)
GLUCOSE SERPL-MCNC: 130 MG/DL (ref 74–99)
GLUCOSE SERPL-MCNC: 130 MG/DL (ref 74–99)
HAPTOGLOB SERPL-MCNC: 196 MG/DL (ref 37–246)
HCT VFR BLD AUTO: 35.8 % (ref 36–46)
HGB BLD-MCNC: 10.6 G/DL (ref 12–16)
M PNEUMO IGM SER IA-ACNC: 0.03 U/L
MCH RBC QN AUTO: 27.2 PG (ref 26–34)
MCHC RBC AUTO-ENTMCNC: 29.6 G/DL (ref 32–36)
MCV RBC AUTO: 92 FL (ref 80–100)
NRBC BLD-RTO: 0.7 /100 WBCS (ref 0–0)
PHOSPHATE SERPL-MCNC: 5.1 MG/DL (ref 2.5–4.9)
PLATELET # BLD AUTO: 253 X10*3/UL (ref 150–450)
POTASSIUM SERPL-SCNC: 4.6 MMOL/L (ref 3.5–5.3)
POTASSIUM SERPL-SCNC: 4.6 MMOL/L (ref 3.5–5.3)
PROT SERPL-MCNC: 5.5 G/DL (ref 6.4–8.2)
PROT SERPL-MCNC: 6 G/DL (ref 6.4–8.2)
PROT UR-ACNC: 163 MG/DL (ref 5–25)
RBC # BLD AUTO: 3.89 X10*6/UL (ref 4–5.2)
SODIUM SERPL-SCNC: 144 MMOL/L (ref 136–145)
SODIUM SERPL-SCNC: 144 MMOL/L (ref 136–145)
UFH PPP CHRO-ACNC: 0.6 IU/ML
WBC # BLD AUTO: 9.8 X10*3/UL (ref 4.4–11.3)

## 2024-01-19 PROCEDURE — 84156 ASSAY OF PROTEIN URINE: CPT | Mod: PARLAB

## 2024-01-19 PROCEDURE — 84166 PROTEIN E-PHORESIS/URINE/CSF: CPT

## 2024-01-19 PROCEDURE — 82947 ASSAY GLUCOSE BLOOD QUANT: CPT

## 2024-01-19 PROCEDURE — 84166 PROTEIN E-PHORESIS/URINE/CSF: CPT | Mod: PARLAB

## 2024-01-19 PROCEDURE — 2500000002 HC RX 250 W HCPCS SELF ADMINISTERED DRUGS (ALT 637 FOR MEDICARE OP, ALT 636 FOR OP/ED): Performed by: INTERNAL MEDICINE

## 2024-01-19 PROCEDURE — 1200000002 HC GENERAL ROOM WITH TELEMETRY DAILY

## 2024-01-19 PROCEDURE — 2780000003 HC OR 278 NO HCPCS: Performed by: INTERNAL MEDICINE

## 2024-01-19 PROCEDURE — 99233 SBSQ HOSP IP/OBS HIGH 50: CPT

## 2024-01-19 PROCEDURE — 2500000004 HC RX 250 GENERAL PHARMACY W/ HCPCS (ALT 636 FOR OP/ED): Performed by: INTERNAL MEDICINE

## 2024-01-19 PROCEDURE — 7100000010 HC PHASE TWO TIME - EACH INCREMENTAL 1 MINUTE: Performed by: INTERNAL MEDICINE

## 2024-01-19 PROCEDURE — 93458 L HRT ARTERY/VENTRICLE ANGIO: CPT | Performed by: INTERNAL MEDICINE

## 2024-01-19 PROCEDURE — 2500000004 HC RX 250 GENERAL PHARMACY W/ HCPCS (ALT 636 FOR OP/ED)

## 2024-01-19 PROCEDURE — 85027 COMPLETE CBC AUTOMATED: CPT

## 2024-01-19 PROCEDURE — B2111ZZ FLUOROSCOPY OF MULTIPLE CORONARY ARTERIES USING LOW OSMOLAR CONTRAST: ICD-10-PCS | Performed by: INTERNAL MEDICINE

## 2024-01-19 PROCEDURE — 36415 COLL VENOUS BLD VENIPUNCTURE: CPT | Performed by: STUDENT IN AN ORGANIZED HEALTH CARE EDUCATION/TRAINING PROGRAM

## 2024-01-19 PROCEDURE — 2500000001 HC RX 250 WO HCPCS SELF ADMINISTERED DRUGS (ALT 637 FOR MEDICARE OP): Performed by: INTERNAL MEDICINE

## 2024-01-19 PROCEDURE — 2500000002 HC RX 250 W HCPCS SELF ADMINISTERED DRUGS (ALT 637 FOR MEDICARE OP, ALT 636 FOR OP/ED): Mod: MUE | Performed by: INTERNAL MEDICINE

## 2024-01-19 PROCEDURE — 2720000007 HC OR 272 NO HCPCS: Performed by: INTERNAL MEDICINE

## 2024-01-19 PROCEDURE — 99152 MOD SED SAME PHYS/QHP 5/>YRS: CPT | Performed by: INTERNAL MEDICINE

## 2024-01-19 PROCEDURE — 2500000001 HC RX 250 WO HCPCS SELF ADMINISTERED DRUGS (ALT 637 FOR MEDICARE OP)

## 2024-01-19 PROCEDURE — C1894 INTRO/SHEATH, NON-LASER: HCPCS | Performed by: INTERNAL MEDICINE

## 2024-01-19 PROCEDURE — 94640 AIRWAY INHALATION TREATMENT: CPT | Mod: MUE

## 2024-01-19 PROCEDURE — 2500000004 HC RX 250 GENERAL PHARMACY W/ HCPCS (ALT 636 FOR OP/ED): Performed by: STUDENT IN AN ORGANIZED HEALTH CARE EDUCATION/TRAINING PROGRAM

## 2024-01-19 PROCEDURE — 2550000001 HC RX 255 CONTRASTS: Performed by: INTERNAL MEDICINE

## 2024-01-19 PROCEDURE — 85520 HEPARIN ASSAY: CPT | Performed by: STUDENT IN AN ORGANIZED HEALTH CARE EDUCATION/TRAINING PROGRAM

## 2024-01-19 PROCEDURE — 2500000002 HC RX 250 W HCPCS SELF ADMINISTERED DRUGS (ALT 637 FOR MEDICARE OP, ALT 636 FOR OP/ED): Mod: MUE | Performed by: STUDENT IN AN ORGANIZED HEALTH CARE EDUCATION/TRAINING PROGRAM

## 2024-01-19 PROCEDURE — 80069 RENAL FUNCTION PANEL: CPT | Mod: CCI

## 2024-01-19 PROCEDURE — 7100000009 HC PHASE TWO TIME - INITIAL BASE CHARGE: Performed by: INTERNAL MEDICINE

## 2024-01-19 PROCEDURE — C1769 GUIDE WIRE: HCPCS | Performed by: INTERNAL MEDICINE

## 2024-01-19 PROCEDURE — 86334 IMMUNOFIX E-PHORESIS SERUM: CPT | Mod: PARLAB

## 2024-01-19 PROCEDURE — 80053 COMPREHEN METABOLIC PANEL: CPT | Performed by: STUDENT IN AN ORGANIZED HEALTH CARE EDUCATION/TRAINING PROGRAM

## 2024-01-19 PROCEDURE — 86038 ANTINUCLEAR ANTIBODIES: CPT | Mod: PARLAB

## 2024-01-19 PROCEDURE — 86325 OTHER IMMUNOELECTROPHORESIS: CPT

## 2024-01-19 PROCEDURE — 2500000005 HC RX 250 GENERAL PHARMACY W/O HCPCS: Performed by: INTERNAL MEDICINE

## 2024-01-19 PROCEDURE — 4A023N7 MEASUREMENT OF CARDIAC SAMPLING AND PRESSURE, LEFT HEART, PERCUTANEOUS APPROACH: ICD-10-PCS | Performed by: INTERNAL MEDICINE

## 2024-01-19 RX ORDER — MIDAZOLAM HYDROCHLORIDE 1 MG/ML
INJECTION INTRAMUSCULAR; INTRAVENOUS AS NEEDED
Status: DISCONTINUED | OUTPATIENT
Start: 2024-01-19 | End: 2024-01-19 | Stop reason: HOSPADM

## 2024-01-19 RX ORDER — NITROGLYCERIN 5 MG/ML
INJECTION, SOLUTION INTRAVENOUS AS NEEDED
Status: DISCONTINUED | OUTPATIENT
Start: 2024-01-19 | End: 2024-01-19 | Stop reason: HOSPADM

## 2024-01-19 RX ORDER — SODIUM CHLORIDE 9 MG/ML
1 INJECTION, SOLUTION INTRAVENOUS CONTINUOUS
Status: ACTIVE | OUTPATIENT
Start: 2024-01-19 | End: 2024-01-20

## 2024-01-19 RX ORDER — FUROSEMIDE 10 MG/ML
20 INJECTION INTRAMUSCULAR; INTRAVENOUS ONCE
Status: CANCELLED | OUTPATIENT
Start: 2024-01-19 | End: 2024-01-19

## 2024-01-19 RX ORDER — IODIXANOL 270 MG/ML
INJECTION, SOLUTION INTRAVASCULAR AS NEEDED
Status: DISCONTINUED | OUTPATIENT
Start: 2024-01-19 | End: 2024-01-19 | Stop reason: HOSPADM

## 2024-01-19 RX ORDER — HEPARIN SODIUM 1000 [USP'U]/ML
INJECTION, SOLUTION INTRAVENOUS; SUBCUTANEOUS AS NEEDED
Status: DISCONTINUED | OUTPATIENT
Start: 2024-01-19 | End: 2024-01-19 | Stop reason: HOSPADM

## 2024-01-19 RX ORDER — LIDOCAINE HYDROCHLORIDE 20 MG/ML
INJECTION, SOLUTION INFILTRATION; PERINEURAL AS NEEDED
Status: DISCONTINUED | OUTPATIENT
Start: 2024-01-19 | End: 2024-01-19 | Stop reason: HOSPADM

## 2024-01-19 RX ORDER — CLOPIDOGREL BISULFATE 75 MG/1
75 TABLET ORAL DAILY
Status: DISCONTINUED | OUTPATIENT
Start: 2024-01-20 | End: 2024-01-23 | Stop reason: HOSPADM

## 2024-01-19 RX ORDER — ATORVASTATIN CALCIUM 40 MG/1
40 TABLET, FILM COATED ORAL NIGHTLY
Status: DISCONTINUED | OUTPATIENT
Start: 2024-01-19 | End: 2024-01-23 | Stop reason: HOSPADM

## 2024-01-19 RX ORDER — CLOPIDOGREL BISULFATE 75 MG/1
300 TABLET ORAL ONCE
Status: COMPLETED | OUTPATIENT
Start: 2024-01-19 | End: 2024-01-19

## 2024-01-19 RX ORDER — SODIUM CHLORIDE 9 MG/ML
INJECTION, SOLUTION INTRAVENOUS CONTINUOUS PRN
Status: COMPLETED | OUTPATIENT
Start: 2024-01-19 | End: 2024-01-19

## 2024-01-19 RX ORDER — LISINOPRIL 5 MG/1
2.5 TABLET ORAL DAILY
Status: DISCONTINUED | OUTPATIENT
Start: 2024-01-19 | End: 2024-01-19

## 2024-01-19 RX ORDER — VERAPAMIL HYDROCHLORIDE 2.5 MG/ML
INJECTION, SOLUTION INTRAVENOUS AS NEEDED
Status: DISCONTINUED | OUTPATIENT
Start: 2024-01-19 | End: 2024-01-19 | Stop reason: HOSPADM

## 2024-01-19 RX ADMIN — POLYETHYLENE GLYCOL 3350 17 G: 17 POWDER, FOR SOLUTION ORAL at 08:40

## 2024-01-19 RX ADMIN — SODIUM CHLORIDE 1 ML/KG/HR: 9 INJECTION, SOLUTION INTRAVENOUS at 20:28

## 2024-01-19 RX ADMIN — PANTOPRAZOLE SODIUM 40 MG: 40 TABLET, DELAYED RELEASE ORAL at 06:00

## 2024-01-19 RX ADMIN — CLOPIDOGREL BISULFATE 300 MG: 75 TABLET ORAL at 16:21

## 2024-01-19 RX ADMIN — BUDESONIDE INHALATION 0.5 MG: 0.5 SUSPENSION RESPIRATORY (INHALATION) at 20:59

## 2024-01-19 RX ADMIN — IRON SUCROSE 300 MG: 20 INJECTION, SOLUTION INTRAVENOUS at 06:01

## 2024-01-19 RX ADMIN — FUROSEMIDE 20 MG: 10 INJECTION, SOLUTION INTRAMUSCULAR; INTRAVENOUS at 06:00

## 2024-01-19 RX ADMIN — METHYLPREDNISOLONE SODIUM SUCCINATE 20 MG: 40 INJECTION, POWDER, FOR SOLUTION INTRAMUSCULAR; INTRAVENOUS at 02:01

## 2024-01-19 RX ADMIN — BUDESONIDE INHALATION 0.5 MG: 0.5 SUSPENSION RESPIRATORY (INHALATION) at 06:22

## 2024-01-19 RX ADMIN — IPRATROPIUM BROMIDE AND ALBUTEROL SULFATE 3 ML: .5; 3 SOLUTION RESPIRATORY (INHALATION) at 06:22

## 2024-01-19 RX ADMIN — AZITHROMYCIN 500 MG: 500 INJECTION, POWDER, LYOPHILIZED, FOR SOLUTION INTRAVENOUS at 23:25

## 2024-01-19 RX ADMIN — IPRATROPIUM BROMIDE AND ALBUTEROL SULFATE 3 ML: .5; 3 SOLUTION RESPIRATORY (INHALATION) at 20:58

## 2024-01-19 RX ADMIN — ATORVASTATIN CALCIUM 40 MG: 40 TABLET, FILM COATED ORAL at 22:01

## 2024-01-19 RX ADMIN — LEVOTHYROXINE SODIUM 75 MCG: 0.07 TABLET ORAL at 08:40

## 2024-01-19 RX ADMIN — METHYLPREDNISOLONE SODIUM SUCCINATE 20 MG: 40 INJECTION, POWDER, FOR SOLUTION INTRAMUSCULAR; INTRAVENOUS at 16:20

## 2024-01-19 RX ADMIN — CEFTRIAXONE SODIUM 1 G: 1 INJECTION, SOLUTION INTRAVENOUS at 22:01

## 2024-01-19 RX ADMIN — IPRATROPIUM BROMIDE AND ALBUTEROL SULFATE 3 ML: .5; 3 SOLUTION RESPIRATORY (INHALATION) at 10:59

## 2024-01-19 RX ADMIN — Medication: at 20:00

## 2024-01-19 RX ADMIN — ASPIRIN 81 MG: 81 TABLET, CHEWABLE ORAL at 08:40

## 2024-01-19 ASSESSMENT — ENCOUNTER SYMPTOMS
FEVER: 0
DIFFICULTY URINATING: 0
HEMATURIA: 1
FLANK PAIN: 0
CHILLS: 0
DYSURIA: 0

## 2024-01-19 ASSESSMENT — PAIN SCALES - GENERAL
PAINLEVEL_OUTOF10: 0 - NO PAIN

## 2024-01-19 ASSESSMENT — PAIN - FUNCTIONAL ASSESSMENT
PAIN_FUNCTIONAL_ASSESSMENT: 0-10

## 2024-01-19 NOTE — CONSULTS
Reason For Consult  Hematuria    History Of Present Illness  Lupe Oshea is a 83 y.o. female presenting with AMS after fall. Urology is being consulted for hematuria. Pt admits to seeing blood before hospitalization. No flank pain, no hx of stones, no dysuria. Does have an extensive smoking hx.     Past Medical History  She has a past medical history of Personal history of other diseases of the circulatory system, Personal history of other endocrine, nutritional and metabolic disease, and Personal history of other endocrine, nutritional and metabolic disease.    Surgical History  She has a past surgical history that includes Other surgical history (07/13/2022).     Social History  She reports that she has been smoking cigarettes. She has been smoking an average of .25 packs per day. She has been exposed to tobacco smoke. She has never used smokeless tobacco. No history on file for alcohol use and drug use.    Family History  No family history on file.     Allergies  Patient has no known allergies.    Review of Systems   Constitutional:  Negative for chills and fever.   Genitourinary:  Positive for hematuria. Negative for difficulty urinating, dysuria and flank pain.         Physical Exam  No distress    Current Facility-Administered Medications:     acetaminophen (Tylenol) oral liquid 650 mg, 650 mg, oral, q4h PRN **OR** acetaminophen (Tylenol) tablet 650 mg, 650 mg, oral, q4h PRN, Gigi Love MD    [Held by provider] amLODIPine (Norvasc) tablet 2.5 mg, 2.5 mg, oral, Daily, Gigi Love MD    aspirin chewable tablet 81 mg, 81 mg, oral, Daily, Gigi Love MD, 81 mg at 01/19/24 0840    atorvastatin (Lipitor) tablet 40 mg, 40 mg, oral, Nightly, Hiral Elliott,     azithromycin (Zithromax) in dextrose 5 % in water (D5W) 250 mL  mg, 500 mg, intravenous, q24h, Gigi Love MD, Stopped at 01/18/24 2250    budesonide (Pulmicort) 0.5 mg/2 mL nebulizer solution 0.5 mg, 0.5 mg, nebulization, BID, Nura STACK  MD Bud, 0.5 mg at 01/19/24 0622    cefTRIAXone (Rocephin) IVPB 1 g, 1 g, intravenous, q24h, Gigi Love MD, Stopped at 01/18/24 2144    dextrose 10 % in water (D10W) infusion, 0.3 g/kg/hr, intravenous, Once PRN, Gigi Love MD    dextrose 50 % injection 25 g, 25 g, intravenous, q15 min PRN, Gigi Love MD    furosemide (Lasix) injection 20 mg, 20 mg, intravenous, q12h, Carlito Arthur DO, 20 mg at 01/19/24 0600    glucagon (Glucagen) injection 1 mg, 1 mg, intramuscular, q15 min PRN, Gigi Love MD    heparin (porcine) injection 2,000-4,000 Units, 2,000-4,000 Units, intravenous, q4h PRN, Gigi Love MD    heparin 25,000 Units in dextrose 5% 250 mL (100 Units/mL) infusion (premix), 0-4,500 Units/hr, intravenous, Continuous, Gigi Love MD, Last Rate: 10 mL/hr at 01/18/24 1630, 1,000 Units/hr at 01/18/24 1630    insulin lispro (HumaLOG) injection 0-5 Units, 0-5 Units, subcutaneous, TID, Gigi Love MD    ipratropium-albuteroL (Duo-Neb) 0.5-2.5 mg/3 mL nebulizer solution 3 mL, 3 mL, nebulization, q2h PRN, Gigi Love MD    ipratropium-albuteroL (Duo-Neb) 0.5-2.5 mg/3 mL nebulizer solution 3 mL, 3 mL, nebulization, TID, Tom Lorenzo MD, 3 mL at 01/19/24 0622    iron sucrose (Venofer) 300 mg in sodium chloride 0.9% 250 mL IV, 300 mg, intravenous, Daily, Preet Pryor DO, Stopped at 01/19/24 0731    levothyroxine (Synthroid, Levoxyl) tablet 75 mcg, 75 mcg, oral, Daily, Gigi Love MD, 75 mcg at 01/19/24 0840    methylPREDNISolone sod succinate (PF) (SOLU-Medrol) 40 mg/mL injection 20 mg, 20 mg, intravenous, q12h, Nura Farrell MD, 20 mg at 01/19/24 0201    pantoprazole (ProtoNix) EC tablet 40 mg, 40 mg, oral, Daily before breakfast, Nura Farrell MD, 40 mg at 01/19/24 0600    polyethylene glycol (Glycolax, Miralax) packet 17 g, 17 g, oral, Daily, Gigi Love MD, 17 g at 01/19/24 0840     Last Recorded Vitals  Blood pressure 108/54, pulse 74, temperature 36.6 °C (97.9 °F), resp. rate 18, height  "1.6 m (5' 2.99\"), weight 56.2 kg (123 lb 14.4 oz), SpO2 91 %.    Relevant Results  Results for orders placed or performed during the hospital encounter of 01/17/24 (from the past 96 hour(s))   Blood Gas Venous Full Panel Unsolicited   Result Value Ref Range    POCT pH, Venous 7.37 7.33 - 7.43 pH    POCT pCO2, Venous 46 41 - 51 mm Hg    POCT pO2, Venous 56 (H) 35 - 45 mm Hg    POCT SO2, Venous 85 (H) 45 - 75 %    POCT Oxy Hemoglobin, Venous 80.9 (H) 45.0 - 75.0 %    POCT Hematocrit Calculated, Venous 32.0 (L) 36.0 - 46.0 %    POCT Sodium, Venous 133 (L) 136 - 145 mmol/L    POCT Potassium, Venous 5.4 (H) 3.5 - 5.3 mmol/L    POCT Chloride, Venous 100 98 - 107 mmol/L    POCT Ionized Calicum, Venous 1.09 (L) 1.10 - 1.33 mmol/L    POCT Glucose, Venous 144 (H) 74 - 99 mg/dL    POCT Lactate, Venous 2.2 (H) 0.4 - 2.0 mmol/L    POCT Base Excess, Venous 0.9 -2.0 - 3.0 mmol/L    POCT HCO3 Calculated, Venous 26.6 (H) 22.0 - 26.0 mmol/L    POCT Hemoglobin, Venous 10.8 (L) 12.0 - 16.0 g/dL    POCT Anion Gap, Venous 12.0 10.0 - 25.0 mmol/L    Patient Temperature 37.0 degrees Celsius    FiO2 36 %    Test Comment VERBAL STAT ORDER RAN X2    CBC and Auto Differential   Result Value Ref Range    WBC 8.7 4.4 - 11.3 x10*3/uL    nRBC 0.2 (H) 0.0 - 0.0 /100 WBCs    RBC 3.78 (L) 4.00 - 5.20 x10*6/uL    Hemoglobin 10.4 (L) 12.0 - 16.0 g/dL    Hematocrit 34.4 (L) 36.0 - 46.0 %    MCV 91 80 - 100 fL    MCH 27.5 26.0 - 34.0 pg    MCHC 30.2 (L) 32.0 - 36.0 g/dL    RDW 15.8 (H) 11.5 - 14.5 %    Platelets 296 150 - 450 x10*3/uL    Neutrophils % 76.3 40.0 - 80.0 %    Immature Granulocytes %, Automated 0.5 0.0 - 0.9 %    Lymphocytes % 11.0 13.0 - 44.0 %    Monocytes % 12.0 2.0 - 10.0 %    Eosinophils % 0.0 0.0 - 6.0 %    Basophils % 0.2 0.0 - 2.0 %    Neutrophils Absolute 6.61 (H) 1.60 - 5.50 x10*3/uL    Immature Granulocytes Absolute, Automated 0.04 0.00 - 0.50 x10*3/uL    Lymphocytes Absolute 0.95 0.80 - 3.00 x10*3/uL    Monocytes Absolute 1.04 " (H) 0.05 - 0.80 x10*3/uL    Eosinophils Absolute 0.00 0.00 - 0.40 x10*3/uL    Basophils Absolute 0.02 0.00 - 0.10 x10*3/uL   Comprehensive metabolic panel   Result Value Ref Range    Glucose 133 (H) 74 - 99 mg/dL    Sodium 137 136 - 145 mmol/L    Potassium 5.4 (H) 3.5 - 5.3 mmol/L    Chloride 100 98 - 107 mmol/L    Bicarbonate 25 21 - 32 mmol/L    Anion Gap 17 10 - 20 mmol/L    Urea Nitrogen 51 (H) 6 - 23 mg/dL    Creatinine 2.12 (H) 0.50 - 1.05 mg/dL    eGFR 23 (L) >60 mL/min/1.73m*2    Calcium 8.1 (L) 8.6 - 10.3 mg/dL    Albumin 3.7 3.4 - 5.0 g/dL    Alkaline Phosphatase 79 33 - 136 U/L    Total Protein 5.9 (L) 6.4 - 8.2 g/dL     (H) 9 - 39 U/L    Bilirubin, Total 0.5 0.0 - 1.2 mg/dL     (H) 7 - 45 U/L   Magnesium   Result Value Ref Range    Magnesium 2.26 1.60 - 2.40 mg/dL   Lactate   Result Value Ref Range    Lactate 1.6 0.4 - 2.0 mmol/L   Troponin I, High Sensitivity   Result Value Ref Range    Troponin I, High Sensitivity 556 (HH) 0 - 13 ng/L   B-Type Natriuretic Peptide   Result Value Ref Range    BNP 2,731 (H) 0 - 99 pg/mL   D-dimer, VTE Exclusion   Result Value Ref Range    D-Dimer, Quantitative VTE Exclusion 1,748 (H) <=500 ng/mL FEU   Influenza A, and B PCR   Result Value Ref Range    Flu A Result Not Detected Not Detected    Flu B Result Not Detected Not Detected   SARS-CoV-2 RT PCR   Result Value Ref Range    Coronavirus 2019, PCR Not Detected Not Detected   RSV PCR   Result Value Ref Range    RSV PCR Not Detected Not Detected   Troponin I, High Sensitivity   Result Value Ref Range    Troponin I, High Sensitivity 769 (HH) 0 - 13 ng/L   Lactate   Result Value Ref Range    Lactate 1.4 0.4 - 2.0 mmol/L   Urinalysis with Reflex Culture and Microscopic   Result Value Ref Range    Color, Urine Camila (N) Straw, Yellow    Appearance, Urine Hazy (N) Clear    Specific Gravity, Urine 1.011 1.005 - 1.035    pH, Urine 5.0 5.0, 5.5, 6.0, 6.5, 7.0, 7.5, 8.0    Protein, Urine 100 (2+) (N) NEGATIVE mg/dL     Glucose, Urine NEGATIVE NEGATIVE mg/dL    Blood, Urine LARGE (3+) (A) NEGATIVE    Ketones, Urine NEGATIVE NEGATIVE mg/dL    Bilirubin, Urine NEGATIVE NEGATIVE    Urobilinogen, Urine <2.0 <2.0 mg/dL    Nitrite, Urine NEGATIVE NEGATIVE    Leukocyte Esterase, Urine NEGATIVE NEGATIVE   Extra Urine Gray Tube   Result Value Ref Range    Extra Tube Hold for add-ons.    Urinalysis Microscopic   Result Value Ref Range    WBC, Urine 1-5 1-5, NONE /HPF    RBC, Urine >20 (A) NONE, 1-2, 3-5 /HPF    Squamous Epithelial Cells, Urine 1-9 (SPARSE) Reference range not established. /HPF    Bacteria, Urine 1+ (A) NONE SEEN /HPF   Troponin I, High Sensitivity   Result Value Ref Range    Troponin I, High Sensitivity 663 (HH) 0 - 13 ng/L   Basic metabolic panel   Result Value Ref Range    Glucose 162 (H) 74 - 99 mg/dL    Sodium 139 136 - 145 mmol/L    Potassium 4.3 3.5 - 5.3 mmol/L    Chloride 99 98 - 107 mmol/L    Bicarbonate 29 21 - 32 mmol/L    Anion Gap 15 10 - 20 mmol/L    Urea Nitrogen 51 (H) 6 - 23 mg/dL    Creatinine 1.85 (H) 0.50 - 1.05 mg/dL    eGFR 27 (L) >60 mL/min/1.73m*2    Calcium 7.8 (L) 8.6 - 10.3 mg/dL   Lipid panel   Result Value Ref Range    Cholesterol 123 0 - 199 mg/dL    HDL-Cholesterol 47.6 mg/dL    Cholesterol/HDL Ratio 2.6     LDL Calculated 67 <=99 mg/dL    VLDL 8 0 - 40 mg/dL    Triglycerides 41 0 - 149 mg/dL    Non HDL Cholesterol 75 0 - 149 mg/dL   Heparin Assay, UFH   Result Value Ref Range    Heparin Unfractionated 0.7 See Comment Below for Therapeutic Ranges IU/mL   Type and screen   Result Value Ref Range    ABO TYPE O     Rh TYPE POS     ANTIBODY SCREEN NEG    POCT GLUCOSE   Result Value Ref Range    POCT Glucose 158 (H) 74 - 99 mg/dL   Lavender Top   Result Value Ref Range    Extra Tube Hold for add-ons.    Streptococcus pneumoniae Antigen, Urine    Specimen: Urine   Result Value Ref Range    Streptococcus pneumoniae Ag, Urine Negative Negative   Legionella Antigen, Urine    Specimen: Urine   Result  Value Ref Range    L. pneumophila Urine Ag Negative Negative   Magnesium   Result Value Ref Range    Magnesium 2.63 (H) 1.60 - 2.40 mg/dL   Hepatic function panel   Result Value Ref Range    Albumin 3.4 3.4 - 5.0 g/dL    Bilirubin, Total 0.3 0.0 - 1.2 mg/dL    Bilirubin, Direct 0.1 0.0 - 0.3 mg/dL    Alkaline Phosphatase 65 33 - 136 U/L     (H) 7 - 45 U/L    AST 69 (H) 9 - 39 U/L    Total Protein 5.5 (L) 6.4 - 8.2 g/dL   Phosphorus   Result Value Ref Range    Phosphorus 6.0 (H) 2.5 - 4.9 mg/dL   Iron and TIBC   Result Value Ref Range    Iron <10 (L) 35 - 150 ug/dL    UIBC 369 110 - 370 ug/dL    TIBC      % Saturation     Transferrin   Result Value Ref Range    Transferrin 298 200 - 360 mg/dL   Lactate dehydrogenase   Result Value Ref Range     84 - 246 U/L   Heparin Assay, UFH   Result Value Ref Range    Heparin Unfractionated 0.5 See Comment Below for Therapeutic Ranges IU/mL   Haptoglobin   Result Value Ref Range    Haptoglobin 196 37 - 246 mg/dL   CBC   Result Value Ref Range    WBC 8.3 4.4 - 11.3 x10*3/uL    nRBC 0.4 (H) 0.0 - 0.0 /100 WBCs    RBC 3.69 (L) 4.00 - 5.20 x10*6/uL    Hemoglobin 10.0 (L) 12.0 - 16.0 g/dL    Hematocrit 32.8 (L) 36.0 - 46.0 %    MCV 89 80 - 100 fL    MCH 27.1 26.0 - 34.0 pg    MCHC 30.5 (L) 32.0 - 36.0 g/dL    RDW 15.5 (H) 11.5 - 14.5 %    Platelets 243 150 - 450 x10*3/uL   Ferritin   Result Value Ref Range    Ferritin 11 8 - 150 ng/mL   SST TOP   Result Value Ref Range    Extra Tube Hold for add-ons.    Lavender Top   Result Value Ref Range    Extra Tube Hold for add-ons.    Lavender Top   Result Value Ref Range    Extra Tube Hold for add-ons.    CBC   Result Value Ref Range    WBC 9.1 4.4 - 11.3 x10*3/uL    nRBC 0.2 (H) 0.0 - 0.0 /100 WBCs    RBC 3.53 (L) 4.00 - 5.20 x10*6/uL    Hemoglobin 9.6 (L) 12.0 - 16.0 g/dL    Hematocrit 32.0 (L) 36.0 - 46.0 %    MCV 91 80 - 100 fL    MCH 27.2 26.0 - 34.0 pg    MCHC 30.0 (L) 32.0 - 36.0 g/dL    RDW 15.6 (H) 11.5 - 14.5 %     Platelets 270 150 - 450 x10*3/uL   SST TOP   Result Value Ref Range    Extra Tube Hold for add-ons.    SST TOP   Result Value Ref Range    Extra Tube Hold for add-ons.    Lavender Top   Result Value Ref Range    Extra Tube Hold for add-ons.    Lavender Top   Result Value Ref Range    Extra Tube Hold for add-ons.    SST TOP   Result Value Ref Range    Extra Tube Hold for add-ons.    Transthoracic Echo (TTE) Complete   Result Value Ref Range    AV pk lew 1.56     LVOT diam 1.80     LV biplane EF 59     LA vol index A/L 27.5     Tricuspid annular plane systolic excursion 2.4     RV free wall pk S' 11.10     LVIDd 4.54     RVSP 50.5     Aortic Valve Area by Continuity of Peak Velocity 1.04     AV pk grad 9.7     LV A4C EF 61.2    Procalcitonin   Result Value Ref Range    Procalcitonin 0.67 (H) <=0.07 ng/mL   Procalcitonin   Result Value Ref Range    Procalcitonin 0.23 (H) <=0.07 ng/mL   POCT GLUCOSE   Result Value Ref Range    POCT Glucose 132 (H) 74 - 99 mg/dL   POCT GLUCOSE   Result Value Ref Range    POCT Glucose 151 (H) 74 - 99 mg/dL   Albumin, Urine Random   Result Value Ref Range    Albumin, Urine Random 474.2 Not established mg/L    Creatinine, Urine Random 46.5 20.0 - 320.0 mg/dL    Albumin/Creatine Ratio 1,019.8 (H) <30.0 ug/mg Creat   Protein, Urine Random   Result Value Ref Range    Total Protein, Urine Random 350 (H) 5 - 24 mg/dL    Creatinine, Urine Random 46.5 20.0 - 320.0 mg/dL    T. Protein/Creatinine Ratio 7.53 (H) 0.00 - 0.17 mg/mg Creat   Heparin Assay, UFH   Result Value Ref Range    Heparin Unfractionated 0.6 See Comment Below for Therapeutic Ranges IU/mL   Comprehensive metabolic panel   Result Value Ref Range    Glucose 130 (H) 74 - 99 mg/dL    Sodium 144 136 - 145 mmol/L    Potassium 4.6 3.5 - 5.3 mmol/L    Chloride 102 98 - 107 mmol/L    Bicarbonate 33 (H) 21 - 32 mmol/L    Anion Gap 14 10 - 20 mmol/L    Urea Nitrogen 48 (H) 6 - 23 mg/dL    Creatinine 1.37 (H) 0.50 - 1.05 mg/dL    eGFR 38 (L)  >60 mL/min/1.73m*2    Calcium 8.7 8.6 - 10.3 mg/dL    Albumin 3.7 3.4 - 5.0 g/dL    Alkaline Phosphatase 72 33 - 136 U/L    Total Protein 6.0 (L) 6.4 - 8.2 g/dL    AST 39 9 - 39 U/L    Bilirubin, Total 0.3 0.0 - 1.2 mg/dL     (H) 7 - 45 U/L   CBC   Result Value Ref Range    WBC 9.8 4.4 - 11.3 x10*3/uL    nRBC 0.7 (H) 0.0 - 0.0 /100 WBCs    RBC 3.89 (L) 4.00 - 5.20 x10*6/uL    Hemoglobin 10.6 (L) 12.0 - 16.0 g/dL    Hematocrit 35.8 (L) 36.0 - 46.0 %    MCV 92 80 - 100 fL    MCH 27.2 26.0 - 34.0 pg    MCHC 29.6 (L) 32.0 - 36.0 g/dL    RDW 15.7 (H) 11.5 - 14.5 %    Platelets 253 150 - 450 x10*3/uL   Renal function panel   Result Value Ref Range    Glucose 130 (H) 74 - 99 mg/dL    Sodium 144 136 - 145 mmol/L    Potassium 4.6 3.5 - 5.3 mmol/L    Chloride 102 98 - 107 mmol/L    Bicarbonate 33 (H) 21 - 32 mmol/L    Anion Gap 14 10 - 20 mmol/L    Urea Nitrogen 48 (H) 6 - 23 mg/dL    Creatinine 1.37 (H) 0.50 - 1.05 mg/dL    eGFR 38 (L) >60 mL/min/1.73m*2    Calcium 8.7 8.6 - 10.3 mg/dL    Phosphorus 5.1 (H) 2.5 - 4.9 mg/dL    Albumin 3.7 3.4 - 5.0 g/dL   POCT GLUCOSE   Result Value Ref Range    POCT Glucose 126 (H) 74 - 99 mg/dL      US renal complete    Result Date: 1/19/2024  Interpreted By:  Ronni Noel, STUDY: US RENAL COMPLETE;  1/18/2024 6:42 pm   INDICATION: Signs/Symptoms:looking for evidence of chronicity of kidney disease.   COMPARISON: None.   ACCESSION NUMBER(S): [id number]   ORDERING CLINICIAN: CEDRICK ESPINAL   TECHNIQUE: Multiple images of the kidneys were obtained  , with color Doppler for blood flow.  It is noted the exam was limited due to patient habitus.   FINDINGS: RIGHT KIDNEY: The right kidney measures 7.1 cm in length.  There is mild cortical thinning and atrophy. A 2.5 cm cortical cyst is present at the midpole. A 2 cm cyst is present at the upper pole. There is a 2.7 cm parapelvic cyst inferiorly.. A 9 mm cortical echogenicity is most likely an angiomyolipoma. However, an echogenic renal cell  carcinoma is not completely excluded. Therefore, correlation with CT or MRI would be recommended for more definitive demonstration of fat and confirmation of angiomyolipoma. No hydronephrosis or evidence of nephrolithiasis. Color Doppler demonstrates renal blood flow.   LEFT KIDNEY: The left kidney measures 8.6 cm in length.  There is also mild cortical thinning and atrophy..   No hydronephrosis or evidence of nephrolithiasis.. Color Doppler demonstrates renal blood flow.   BLADDER: A 3.4 x 2.6 x 7.3 cm lesion is at the floor of the bladder. Centrally this is predominantly echo lucent , but blood flow is demonstrated at the periphery. This would be most suspicious for a necrotic malignant mass. Urological consultation recommended.   OTHER FINDINGS: Moderate left pleural effusion.         1. Bladder mass as described. 2. 9 mm cortical echogenicity at the right kidney, probably an angiomyolipoma. However, CT or MRI correlation would be recommended. 3. Left pleural effusion.   Signed by: Ronni Noel 1/19/2024 10:14 AM Dictation workstation:   [id number]    Transthoracic Echo (TTE) Complete    Result Date: 1/18/2024    [facility], [address] [place] [id number] [phone] and                                 Fax [phone] TRANSTHORACIC ECHOCARDIOGRAM REPORT  Patient Name:      ALPHONSO Sierra Physician:    [id number] Faisal Krueger MD Study Date:        1/18/2024            Ordering Provider:    [id number] DALI JEFFERY MRN/PID:           [id number]             Fellow: Accession#:        [id number]         Nurse: Date of Birth/Age: 1940 / 83 years  Sonographer:          Louise Peres RDCS Gender:            F                    Additional Staff: Height:            160.02 cm            Admit Date:            1/17/2024 Weight:            56.25 kg             Admission Status:     Observation -                                                               Priority discharge BSA:               1.58 m2              Encounter#:           [id number]                                         Department Location:  [facility] Blood Pressure: 101 /50 mmHg Study Type:    TRANSTHORACIC ECHO (TTE) COMPLETE Diagnosis/ICD: Non ST elevation (NSTEMI) myocardial infarction-I21.4 Indication:    Hypoxic CPT Code:      Echo Complete w Full [id number] Patient History: Smoker: Current. Study Detail: The following Echo studies were performed: 2D, M-Mode, Doppler and               color flow.  PHYSICIAN INTERPRETATION: Left Ventricle: The left ventricular systolic function is mildly decreased, with an estimated ejection fraction of 40-45%. Wall motion is abnormal. The left ventricular cavity size is normal. Spectral Doppler shows a pseudonormal pattern of left ventricular diastolic filling. LV Wall Scoring: The entire anterior septum and apex are hypokinetic. Left Atrium: The left atrium is normal in size. Right Ventricle: The right ventricle is upper limits of normal in size. There is normal right ventricular global systolic function. Right Atrium: The right atrium is moderately dilated. Aortic Valve: The aortic valve is trileaflet. There is mild to moderate aortic valve cusp calcification. There is mild aortic valve thickening. There is evidence of mildly elevated transaortic gradients consistent with sclerosis of the aortic valve. There is no evidence of aortic valve regurgitation. The peak instantaneous gradient of the aortic valve is 9.7 mmHg. Mitral Valve: The mitral valve is mildly thickened. There is mild to moderate mitral valve regurgitation. Tricuspid Valve: The tricuspid valve is structurally normal. There is trace tricuspid regurgitation. The Doppler estimated RVSP is moderately elevated at 50.5 mmHg. Pulmonic Valve: The  pulmonic valve is structurally normal. There is mild pulmonic valve regurgitation. Pericardium: There is a trivial pericardial effusion. Pleural: There is a small bilateral pleural effusion. Aorta: The aortic root is normal. Systemic Veins: The inferior vena cava appears dilated. There is less than 50% IVC collapse with inspiration. In comparison to the previous echocardiogram(s): There are no prior studies on this patient for comparison purposes.  CONCLUSIONS:  1. Left ventricular systolic function is mildly decreased with a 40-45% estimated ejection fraction.  2. Entire anterior septum and apex are abnormal.  3. Spectral Doppler shows a pseudonormal pattern of left ventricular diastolic filling.  4. The right atrium is moderately dilated.  5. Mild to moderate mitral valve regurgitation.  6. Moderately elevated right ventricular systolic pressure.  7. Aortic valve sclerosis. QUANTITATIVE DATA SUMMARY: 2D MEASUREMENTS:                          Normal Ranges: Ao Root d:     2.80 cm   (2.0-3.7cm) LAs:           4.00 cm   (2.7-4.0cm) IVSd:          0.96 cm   (0.6-1.1cm) LVPWd:         0.76 cm   (0.6-1.1cm) LVIDd:         4.54 cm   (3.9-5.9cm) LVIDs:         2.77 cm LV Mass Index: 80.6 g/m2 LV % FS        39.0 % LA VOLUME:                               Normal Ranges: LA Vol A4C:        40.8 ml    (22+/-6mL/m2) LA Vol A2C:        42.5 ml LA Vol BP:         43.4 ml LA Vol Index A4C:  25.9ml/m2 LA Vol Index A2C:  27.0 ml/m2 LA Vol Index BP:   27.5 ml/m2 LA Area A4C:       15.8 cm2 LA Area A2C:       15.5 cm2 LA Major Axis A4C: 5.2 cm LA Major Axis A2C: 4.8 cm LA Volume Index:   26.2 ml/m2 LA Vol A4C:        38.6 ml LA Vol A2C:        42.5 ml RA VOLUME BY A/L METHOD:                       Normal Ranges: RA Area A4C: 17.4 cm2 M-MODE MEASUREMENTS:                  Normal Ranges: Ao Root: 3.20 cm (2.0-3.7cm) LAs:     4.10 cm (2.7-4.0cm) AORTA MEASUREMENTS:                    Normal Ranges: Asc Ao, d: 2.90 cm (2.1-3.4cm) LV  SYSTOLIC FUNCTION BY 2D PLANIMETRY (MOD):                     Normal Ranges: EF-A4C View: 61.2 % (>=55%) EF-A2C View: 58.1 % EF-Biplane:  59.4 % LV DIASTOLIC FUNCTION:                               Normal Ranges: MV Peak A:        0.91 m/s    (0.42-0.7 m/s) MV lateral e'     0.07 m/s MV medial e'      0.05 m/s MV A Dur:         137.00 msec PulmV Sys Ho:    76.90 cm/s PulmV Puckett Ho:   46.70 cm/s PulmV S/D Ho:    1.60 PulmV A Revs Ho: 25.60 cm/s PulmV A Revs Dur: 106.00 msec MITRAL VALVE:                 Normal Ranges: MV DT: 198 msec (150-240msec) AORTIC VALVE:                         Normal Ranges: AoV Vmax:      1.56 m/s (<=1.7m/s) AoV Peak P.7 mmHg (<20mmHg) LVOT Max Ho:  0.64 m/s (<=1.1m/s) LVOT VTI:      14.10 cm LVOT Diameter: 1.80 cm  (1.8-2.4cm) AoV Area,Vmax: 1.04 cm2 (2.5-4.5cm2)  RIGHT VENTRICLE: RV Basal 3.81 cm RV Mid   1.97 cm RV Major 7.7 cm TAPSE:   23.7 mm RV s'    0.11 m/s TRICUSPID VALVE/RVSP:                             Normal Ranges: Peak TR Velocity: 2.98 m/s RV Syst Pressure: 50.5 mmHg (< 30mmHg) IVC Diam:         2.00 cm Pulmonary Veins: PulmV A Revs Dur: 106.00 msec PulmV A Revs Ho: 25.60 cm/s PulmV Puckett Ho:   46.70 cm/s PulmV S/D Ho:    1.60 PulmV Sys Ho:    76.90 cm/s  [id number] Faisal Krueger MD Electronically signed on 2024 at 12:16:27 PM  Wall Scoring  ** Final **     NM Lung Perfusion    Result Date: 2024  Interpreted By:  Cynthia Islas and Maltbie Grace STUDY: NM LUNG PERFUSION;  2024 8:54 am   INDICATION: Signs/Symptoms:SOB, hypoxia, elevated d-dimer.   COMPARISON: CT chest 2024   ACCESSION NUMBER(S): [id number]   ORDERING CLINICIAN: PING POTTS   TECHNIQUE: DIVISION OF NUCLEAR MEDICINE PERFUSION LUNG SCANS   Multiple perfusion images of the lungs were acquired after the intravenous administration of 4.1 mCi of Tc-99m macroaggregated albumin (MAA). In addition, SPECT of the chest was performed.   FINDINGS: Perfusion images of both lungs demonstrate  mild heterogeneity throughout the lung fields bilaterally. No distinct wedge-shaped subsegmental or segmental perfusion defect seen on SPECT to suggest acute pulmonary embolism (low probability).       1. No distinct wedge-shaped subsegmental or segmental perfusion defect seen on SPECT to suggest acute pulmonary embolism (low probability).   The interpretation above is based on modified PIOPED II and PISAPED criteria.   I personally reviewed the images/study and I agree with the findings as stated by Nuclear Medicine fellow Sol Gil MD. This study was interpreted at [place], [place].   Signed by: Cynthia Islas 1/18/2024 10:17 AM Dictation workstation:   [id number]    CT chest wo IV contrast    Result Date: 1/18/2024  Interpreted By:  Finkelstein, Evan, STUDY: CT CHEST WO IV CONTRAST;  1/18/2024 12:59 am   INDICATION: Signs/Symptoms:New hypoxia, concern for pneumonia.   COMPARISON: Chest radiograph 01/17/2024   ACCESSION NUMBER(S): [id number]   ORDERING CLINICIAN: PING POTTS   TECHNIQUE: Axial CT images of the chest obtained  without IV contrast.  Sagittal and coronal reconstructions were created..   FINDINGS: CHEST WALL AND LOWER NECK: Within normal limits. UPPER ABDOMEN: There are vascular calcifications involving the renal vessels bilaterally along with suspected nonobstructing stones. Simple appearing cysts in the right kidney. Right suprarenal hypodensity measuring approximately 1.7 cm favored to represent an adrenal adenoma.   VESSELS: Enlarged main pulmonary artery measures up to 3.3 cm. Atherosclerotic calcifications in the aorta and coronary arteries. HEART: Cardiomegaly. No pericardial effusion. MEDIASTINUM AND HERMAN: 1 cm subcarinal lymph node. Prominent lymph nodes scattered elsewhere throughout the mediastinum as well. LUNG, PLEURA, AND LARGE AIRWAYS: Moderate left and small right pleural effusions. Thickened interlobular septal markings. Patchy left  basilar airspace opacity. Dependent atelectasis. Moderate emphysematous changes scattered throughout the lungs bilaterally. Ground-glass opacities most pronounced along the periphery of the lungs. 3 mm pleural-based nodule along the periphery of the right middle lobe, best seen image 162 of series 202. 1.1 cm nodule in the lingula image 135.   BONES: No acute osseous abnormality.       Patchy left basilar airspace opacity concerning for pneumonia. Additional dependent opacities are favored to represent superimposed atelectasis.   Ground-glass opacities most pronounced along the periphery of the lungs which may be infectious or inflammatory in etiology.   Moderate left and small right pleural effusions.   Cardiomegaly with thickened interlobular septal markings suggesting pulmonary interstitial edema.   Enlarged main pulmonary artery measures up to 3.3 cm and may be seen with pulmonary arterial hypertension.   Prominent nonspecific mediastinal lymph nodes, possibly reactive.   Scattered pulmonary nodules, largest measuring up to 1.1 cm in the lingula. Recommend follow-up imaging with CT at 3-6 months as per Fleischner criteria.   MACRO: None.   Signed by: Evan Finkelstein 1/18/2024 1:30 AM Dictation workstation:   [id number]    CT head wo IV contrast    Result Date: 1/18/2024  Interpreted By:  Finkelstein, Evan, STUDY: CT HEAD WO IV CONTRAST;  1/18/2024 12:59 am   INDICATION: Signs/Symptoms:Altered Mental Status.   COMPARISON: None.   ACCESSION NUMBER(S): [id number]   ORDERING CLINICIAN: PING POTTS   TECHNIQUE: Axial noncontrast CT images of the head with coronal and sagittal reconstructions.   FINDINGS: EXTRACRANIAL SOFT TISSUES: Unremarkable.   CALVARIUM: No depressed skull fracture. No destructive osseous lesion.   PARANASAL SINUSES/MASTOIDS: The visualized paranasal sinuses and mastoid air cells are aerated.   HEMORRHAGE: No acute intracranial hemorrhage.   BRAIN PARENCHYMA: Gray-white matter interfaces are  preserved. No mass effect or midline shift. There are nonspecific scattered white matter hypodensities.   VENTRICLES and EXTRA-AXIAL SPACES: Parenchymal atrophy with prominence of the ventricles and cortical sulci.   OTHER FINDINGS: There are calcifications within the cavernous carotids       No acute intracranial hemorrhage, mass effect or midline shift.   Nonspecific scattered white matter hypodensities favored to represent sequela of small vessel ischemia.     MACRO: None.   Signed by: Evan Finkelstein 1/18/2024 1:21 AM Dictation workstation:   [id number]    Lower extremity venous duplex bilateral    Result Date: 1/18/2024  Interpreted By:  Jerry Prieto, STUDY: [facility] LOWER EXTREMITY VENOUS DUPLEX BILATERAL;  1/18/2024 12:11 am   INDICATION: Signs/Symptoms:Concerns for PE and DVTs.   COMPARISON: None.   ACCESSION NUMBER(S): [id number]   ORDERING CLINICIAN: PING POTTS   TECHNIQUE: Grayscale, color and spectral Doppler sonographic images of the bilateral lower extremity deep venous system.   FINDINGS: RIGHT: There is normal compressibility of the common femoral vein, saphenous femoral junction, profunda femoral vein and popliteal vein. The posterior tibial and peroneal veins  demonstrate normal color flow and compressibility. There is normal spontaneous and phasic variation throughout the leg by spectral doppler.   LEFT: There is normal compressibility of the common femoral vein, saphenous femoral junction, profunda femoral vein and popliteal vein. The posterior tibial and peroneal veins  are suboptimally visualized. There is normal spontaneous and phasic variation throughout the leg by spectral doppler.   OTHER FINDINGS: None.       Suboptimal visualization of the left calf veins. No sonographic evidence DVT in the visualized vessels of bilateral lower extremities.   MACRO: None   Signed by: Jerry Prieto 1/18/2024 12:32 AM Dictation workstation:   [id number]    XR chest 1 view    Result Date:  1/17/2024  Interpreted By:  Ronni Noel, STUDY: XR CHEST 1 VIEW;  1/17/2024 3:21 pm   INDICATION: Signs/Symptoms:fatigue/SOB.   COMPARISON: None.   ACCESSION NUMBER(S): [id number]   ORDERING CLINICIAN: SERAFIN RAO   FINDINGS: CARDIOMEDIASTINAL SILHOUETTE AND VASCULATURE:   Cardiac size:  Mild cardiomegaly   Aortic shadow:  Within normal limits.   Mediastinal contours: Within normal limits.   Pulmonary vasculature:  Mild cephalization of the pulmonary blood flow suggesting mild congestion.   LUNGS: There is mild interstitial prominence probably accentuated from a somewhat shallow inspiration, but may be due to mild or early congestion. Slight opacity left lower lung is probably from atelectasis and/or fibrosis, or possibly focal infiltrate. There is blunting at the costophrenic angle that may be from a small effusion. Follow-up as clinically warranted.   ABDOMEN AND OTHER FINDINGS: No remarkable upper abdominal findings.   BONES: No acute osseous changes.       1.  Fibrosis and/or mild or early congestion and left lower lung atelectasis or infiltrate.   Signed by: Ronni Noel 1/17/2024 3:33 PM Dictation workstation:   [id number]        Assessment/Plan   Gross hematuria/Abnormal radiographic finding of urinary bladder  -US already ordered showing large bladder mass 3.4x2.6x7.3 cm, no hydronephrosis  -Plan for outpatient cysto b/l RGP TURBT as outpatient, will need medically optimized before any anesthesia can take place (recent NSTEMI), would also need clearance to be off anticoagulation prior to any surgical intervention  -No acute surgical intervention planned for this admission    NSTEMI/Acute respiratory failure  -Per Cardiology/Primary Team     Thank you for allowing us to participate in this patient's care, we will follow    Sukhjinder Gauthier PA-C

## 2024-01-19 NOTE — CARE PLAN
The patient's goals for the shift include      The clinical goals for the shift include pt oxygen saturation will remain above 92% throught shift    Over the shift, the patient did make progress toward the following goals.

## 2024-01-19 NOTE — PROGRESS NOTES
Pt unavailable at this time d/t undergoing heart cath today.  (Working remote) PT recommended LOW intensity level of care upon discharge.  TCC team to follow for possible HHC.

## 2024-01-19 NOTE — PROGRESS NOTES
Lupe Oshea is a 83 y.o. female on day 2 of admission presenting with AMS (altered mental status).      SUBJECTIVE     Patient evaluated this morning and found to be resting comfortably in bedside recliner.  Patient is very eager to get out and frustrated at times to the point where she raises her voice.  Medical management plan was rediscussed with her and she was put at ease.  Cardiology was also in the room while evaluated patient.  Tentative plan is to proceed with heart catheterization.  She remains on 6 L O2.  Overall she does feel like she has improved and feels that she is not as achy or as tired this morning compared to previous days.    OBJECTIVE     Vitals:    01/19/24 0437 01/19/24 0623 01/19/24 0830 01/19/24 1059   BP: 94/51  108/54    Patient Position:       Pulse: 72  74    Resp:       Temp: 36.4 °C (97.5 °F)  36.6 °C (97.9 °F)    TempSrc:       SpO2: 92% 92% 91% 92%   Weight:       Height:          Results from last 7 days   Lab Units 01/19/24  0713 01/18/24  0355 01/17/24  1509   WBC AUTO x10*3/uL 9.8   < > 8.7   HEMOGLOBIN g/dL 10.6*   < > 10.4*   HEMATOCRIT % 35.8*   < > 34.4*   PLATELETS AUTO x10*3/uL 253   < > 296   NEUTROS PCT AUTO %  --   --  76.3   LYMPHS PCT AUTO %  --   --  11.0   MONOS PCT AUTO %  --   --  12.0   EOS PCT AUTO %  --   --  0.0    < > = values in this interval not displayed.     Results from last 7 days   Lab Units 01/19/24  0713   SODIUM mmol/L 144  144   POTASSIUM mmol/L 4.6  4.6   CHLORIDE mmol/L 102  102   CO2 mmol/L 33*  33*   BUN mg/dL 48*  48*   CREATININE mg/dL 1.37*  1.37*   CALCIUM mg/dL 8.7  8.7   PROTEIN TOTAL g/dL 6.0*   BILIRUBIN TOTAL mg/dL 0.3   ALK PHOS U/L 72   ALT U/L 100*   AST U/L 39   GLUCOSE mg/dL 130*  130*       Scheduled Medications  [Held by provider] amLODIPine, 2.5 mg, oral, Daily  aspirin, 81 mg, oral, Daily  atorvastatin, 40 mg, oral, Nightly  azithromycin, 500 mg, intravenous, q24h  budesonide, 0.5 mg, nebulization,  BID  cefTRIAXone, 1 g, intravenous, q24h  furosemide, 20 mg, intravenous, q12h  insulin lispro, 0-5 Units, subcutaneous, TID  ipratropium-albuteroL, 3 mL, nebulization, TID  iron sucrose, 300 mg, intravenous, Daily  levothyroxine, 75 mcg, oral, Daily  methylPREDNISolone sodium succinate (PF), 20 mg, intravenous, q12h  pantoprazole, 40 mg, oral, Daily before breakfast  polyethylene glycol, 17 g, oral, Daily       Physical Exam    Constitutional: Well developed, A&Ox3, no acute distress, alert and cooperative  Eyes: EOMI, clear sclera  Respiratory/Thorax: Bilateral expiratory wheezes with good chest expansion, oxygen supplementation via Venturi mask  Cardiovascular: RRR, no murmurs, 2+ equal pulses of the extremities, no JVD appreciated  Gastrointestinal: Nondistended, soft, non-tender  Extremities: normal extremities, no cyanosis edema, no clubbing  Psychological: Appropriate mood and behavior  Skin: Warm and dry    ASSESSMENT & PLAN     Daily Progress  --1/19: Less tired and achy today.  Proceeding with heart catheterization.  Contraction alkalosis will hold diuresis for now.  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  -Renal/Bladder US 1/18: 3.4 x 2.6 x 7.3 cm lesion at floor of bladder.  Right kidney 7.1 cm with mild cortical thinning and atrophy.  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  Left kidney 8.6 cm also with mild cortical thinning and  atrophy  -Ualbumin 474.2, Ucr 46.5, Uprotein 350  PLAN:  -Will hold diuresis today given contraction alkalosis seen on morning labs and given soft blood pressures.  Consider restarting after heart catheterization if pressures allow.  -Should be noted that BP should be monitored closely due to presence of aortic stenosis seen on last echo) patient is preload dependent).  -Ordering SPEP and UPEP, C3/C4, BHAVIN, JESUS 2 antibody  -Avoid nephrotoxic agents and hypotension  -Maintain strict I/Os, daily standing weights  -1500 mL fluid restriction  -Trend kidney function with morning labs    Carlito Arthur,   PGY-1, Internal Medicine  Please SecureChat for any further questions  This is a preliminary note, please await attending attestation for final A/P

## 2024-01-19 NOTE — CONSULTS
Assessment and Plan  Patient is 83 y.o. female  with the following medical Problems:  Left lower lobe pneumonia  Acute hypoxic respiratory failure requiring supplemental oxygen  COPD with mild acute exacerbation.  Left lower lobe atelectasis  Bilateral small pleural effusion  Secondary pulmonary hypertension  Scattered pulmonary nodules, largest measuring up to 1.1 cm in the lingula.  NSTEMI  Heart failure with moderately reduced ejection fraction      Plan of Care:  Continue with ceftriaxone and azithromycin  Pneumonia workup including Legionella urinary antigen, strep urinary antigen, mycoplasma IgM, procalcitonin level, MRSA swab.  CT scan findings are suggestive of small left lower lobe pneumonia with small bilateral pleural effusions however patient has CT scan findings just of of COPD  Scheduled Solu-Medrol with scheduled DuoNeb and Pulmicort.  Effusions are small.  No indication for thoracentesis at this stage.  If effusions worsen will consider thoracentesis.  Patient is currently full anticoagulation with heparin drip for NSTEMI covers for DVT prophylaxis.  GI prophylaxis with Protonix.  Incentive spirometer to improve atelectasis.  Strictly avoid benzodiazepine and opioid.        History of Present Illness:      Patient is a 83-year-old woman with HTN, HLD, hypothyroidism, CKD Stage III, T2DM who presented to [facility] ED on 1/17/24 with confusion for the last week and following a mechanical fall.  Patient's symptoms had been going on for the last 5 days with acute worsening in symptoms over the last 24 hours. During this time the patient has endorses worsening cough, weakness, and confusion. The patient endorses poor PO intake and increased lethargy. She denies any chest pain, shortness of breath, fever or chills during this time. She denies any weight gain or peripheral edema as well. Patient denies any history of clots, MI, or CHF. Last night/this morning, the patient fell after getting up from bed. It was  unwitnessed; however, the patient's Son heard the fall and quickly came to the scene. Patient denies hitting her head. Patient is not on any blood thinners. Family mentions that the time of presenting to the ED the patient had been more altered and not like herself than she had been the following week/     In the ED, vitals were significant for HR 46, SpO2 77% on RA, /53. Patient was placed on 6L NC with improvement in hypoxia to 94%. Labs significant for K 5.4, Cr 2.12 (from 1.06), , , BNP 2731, Trop 556 -> 769, d-dimer 1748, Hgb 10.4 (from 14.5). CXR showed fibrosis and/or mild or early congestion and left lower lung atelectasis or infiltrate. Patient was started on heparin gtt in ED. Patient was also given 2g IV magnesium, 125mg Solu-Medrol, and 40mg IVP lasix in the ED.      PMH: As stated above.   PSH: No previous surgeries on file.  Social: Current smoker, no alcohol use, no illicit drug use  Family: Patient denies any family history of heart disease or clots. She denies personal history of clots  Medications/Allergies: NKDA    Daily progress:  January 19, 2023: Patient underwent left-sided heart catheterization today.  She was found to have  of LAD.  She remains on supplemental oxygen.  She has no fever, chills, rigors.  Culture remains negative to date.    Past Surgical History:   Procedure Laterality Date    OTHER SURGICAL HISTORY  07/13/2022    No history of surgery       Family History:   No relevant family history has been documented for this patient.    Allergies:     No Known Allergies      Social history:   reports that she has been smoking cigarettes. She has been smoking an average of .25 packs per day. She has been exposed to tobacco smoke. She has never used smokeless tobacco.    Current Medications:   No recently discontinued medications to reconcile    Review of Systems:   General: denies weight gain, denies loss of appetite, fever, chills, night sweats.  HEENT: denies  headaches, dizziness, head trauma, visual changes, eye pain, tinnitus, nosebleeds, hoarseness or throat pain    Respiratory: denies chest pain, +ve dyspnea, cough but no hemoptysis  Cardiovascular: denies orthopnea, paroxysmal nocturnal dyspnea, leg swelling, and previous heart attack.    Gastrointestinal: denies pain, nausea vomiting, diarrhea, constipation, melena or bleeding.  Genitourinary: denies hematuria, frequency, urgency or dysuria  Neurology: denies syncope, seizures, paralysis, paraesthesia   Endocrine: denies polyuria, polydipsia, skin or hair changes, and heat or cold intolerance  Musculoskeletal: denies joint pain, swelling, arthritis or myalgia  Hematologic: denies bleeding, adenopathy and easy bruising  Skin: denies rashes and skin discoloration  Psychiatry: denies depression    Physical Exam:   Vital Signs:   Vitals:    01/19/24 1515   BP:    Pulse:    Resp:    Temp:    SpO2: 97%       Input/Output:    Intake/Output Summary (Last 24 hours) at 1/19/2024 1533  Last data filed at 1/19/2024 1328  Gross per 24 hour   Intake 670 ml   Output 802 ml   Net -132 ml         Oxygen requirements:    Ventilator Information:  FiO2 (%):  [40 %] 40 %          General appearance: Not in pain or distress, in no respiratory distress    HEENT: Atraumatic/normocephalic, EOMI, RAMAKRISHNA, pharynx clear, moist mucosa, redness of the uvula appreciated,   Neck: Supple, no jugular venous distension, lymphadenopathy, thyromegaly or carotid bruits  Chest: Decreased breath sounds, +ve faint wheezing, +ve crackles and no tenderness over ribs   Cardiovascular: Normal S1, S2, regular rate and rhythm, no murmur, rub or gallop  Abdomen: Normal sounds present, soft, lax with no tenderness, no hepatosplenomegaly, and no masses  Extremities: No edema. Pulses are equally present.   Skin: intact, no rashes   Neurologic: Alert and oriented x 2-3, No focal deficit     Investigations:  Labs, radiological imaging and cardiac work up were  personally reviewed          .       STAFF PHYSICIAN NOTE OF PERSONAL INVOLVEMENT IN CARE  As the attending physician, I certify that I personally reviewed the patient's history and personally examined the patient to confirm the physical findings described above, and that I reviewed the relevant imaging studies and available reports.  I also discussed the differential diagnosis and all of the proposed management plans with the patient and individuals accompanying the patient to this visit.  They had the opportunity to ask questions about the proposed management plans and to have those questions answered.

## 2024-01-19 NOTE — POST-PROCEDURE NOTE
Physician Transition of Care Summary  Invasive Cardiovascular Lab    Procedure Date: 1/19/2024  Attending:    * Ronni Muro - Primary  Resident/Fellow/Other Assistant: Surgeon(s) and Role:    Indications:   Pre-op Diagnosis     * NSTEMI (non-ST elevated myocardial infarction) (CMS/HCC) [I21.4]    Post-procedure diagnosis:   Post-op Diagnosis     * NSTEMI (non-ST elevated myocardial infarction) (CMS/HCC) [I21.4]    Procedure(s):   Left Heart Cath, With LV  64017 - DC CATH PLMT L HRT & ARTS W/NJX & ANGIO IMG S&I        Procedure Findings:    LAD, branch vessel CAD of PDA     Description of the Procedure:   [facility]    Complications:   none    Stents/Implants:       Anticoagulation/Antiplatelet Plan:   heparin    Estimated Blood Loss:   2 mL    Anesthesia: Moderate Sedation Anesthesia Staff: No anesthesia staff entered.    Any Specimen(s) Removed:   No specimens collected during this procedure.    Disposition:   tele      Electronically signed by: Ronni Muro MD, 1/19/2024 1:40 PM

## 2024-01-19 NOTE — PROGRESS NOTES
"Lupe Oshea is a 83 y.o. female on day 2 of admission presenting with AMS (altered mental status).    Subjective    Breathing status not changed compared to yesterday, still on 6 L via ventimask. Incredibly frustrated regarding not making much progress during hospitalization. Still having soft blood pressures. No chest pain.    Objective   Physical Exam  GENERAL: disgruntled, appears chronically ill, awake/alert/oriented x3, mild distress, very slightly cooperative  HEENT: AT/NC, PERRL, EOMI  NECK: Normal Inspection, No JVD  CARDIOVASCULAR: RRR, no murmurs, 2+ equal pulses of the extremities, normal S1 and S 2  RESPIRATORY: Decreased breath sounds, good chest expansion, not in acute respiratory distress, no Wheezes, Rales or Rhonchi, on Ventimask  ABDOMEN: Soft, Non-Tender, Normal Bowel Sounds, No Distention  SKIN: Warm and dry  EXTREMITIES: No lower extremity edema  NEURO: No focal deficits, moving all 4 extremities spontaneously  PSYCH: anxious    Last Recorded Vitals  Blood pressure 108/54, pulse 74, temperature 36.6 °C (97.9 °F), resp. rate 18, height 1.6 m (5' 2.99\"), weight 56.2 kg (123 lb 14.4 oz), SpO2 91 %.    Assessment/Plan   83-year-old female with history of hypertension, hyperlipidemia, hypothyroidism, diabetes not on insulin, CKD stage III presented on 1/17 with concerns of weakness worsening for the last few days, with sudden onset 2 weeks ago.     Echocardiogram completed 1/18 notable for ejection fraction 40-45%, anterior septal and apex wall abnormal, RA moderately dilated, mid-moderate mitral valve regurgitation, moderately elevated RVSP, AV sclerosis.     #NSTEMI  #HFrEF, in exacerbation, new onset  #Moderate left and small right pleural effusions  #Transaminitis, suspect congestive secondary to above    -Patient likely experienced a myocardial infarction 2 weeks ago. Not currently having chest pain, troponins peaked at 769.  Blood pressures remain soft but stable, tolerated diuresis " yesterday and would continue as tolerated today, remains on 6 L via ventimask. Continue aspirin, heparin drip for total of 48 hours. Pt willing to undergo cardiac cath today, will complete in afternoon. ALT/AST downtrended, will initiate high intensity statin.        Hiral Elliott, DO  Internal Medicine, PGY-II

## 2024-01-19 NOTE — PROGRESS NOTES
Speech-Language Pathology                 Therapy Communication Note    Patient Name: Lupe Oshea  MRN: [id number]  Today's Date: 1/19/2024     Discipline: Speech Language Pathology    Missed Visit Reason:  NPO for procedure, per RN. Swallow eval deferred.     Missed Time: Attempt    Comment: Will follow-up in next 24 hours.

## 2024-01-19 NOTE — PROGRESS NOTES
Lupe Oshea is a 83 y.o. female on day 2 of admission presenting with AMS (altered mental status).      Subjective   Seen and examined at bedside.  No acute events overnight.  On 6 L nasal cannula oxygen.  Saturating well.       Objective     Last Recorded Vitals  /54   Pulse 74   Temp 36.6 °C (97.9 °F)   Resp 18   Wt 56.2 kg (123 lb 14.4 oz)   SpO2 92%   Intake/Output last 3 Shifts:    Intake/Output Summary (Last 24 hours) at 1/19/2024 1111  Last data filed at 1/19/2024 0935  Gross per 24 hour   Intake 934.83 ml   Output 800 ml   Net 134.83 ml         Admission Weight  Weight: 56.2 kg (123 lb 14.4 oz) (01/17/24 1108)    Daily Weight  01/18/24 : 56.2 kg (123 lb 14.4 oz)    Image Results  US renal complete  Narrative: Interpreted By:  Ronni Noel,   STUDY:  US RENAL COMPLETE;  1/18/2024 6:42 pm      INDICATION:  Signs/Symptoms:looking for evidence of chronicity of kidney disease.      COMPARISON:  None.      ACCESSION NUMBER(S):  [id number]      ORDERING CLINICIAN:  CEDRICK ESPINAL      TECHNIQUE:  Multiple images of the kidneys were obtained  , with color Doppler  for blood flow.  It is noted the exam was limited due to patient  habitus.      FINDINGS:  RIGHT KIDNEY:  The right kidney measures 7.1 cm in length.  There is mild cortical  thinning and atrophy. A 2.5 cm cortical cyst is present at the  midpole. A 2 cm cyst is present at the upper pole. There is a 2.7 cm  parapelvic cyst inferiorly.. A 9 mm cortical echogenicity is most  likely an angiomyolipoma. However, an echogenic renal cell carcinoma  is not completely excluded. Therefore, correlation with CT or MRI  would be recommended for more definitive demonstration of fat and  confirmation of angiomyolipoma. No hydronephrosis or evidence of  nephrolithiasis. Color Doppler demonstrates renal blood flow.      LEFT KIDNEY:  The left kidney measures 8.6 cm in length.  There is also mild  cortical thinning and atrophy..   No hydronephrosis or  evidence of  nephrolithiasis.. Color Doppler demonstrates renal blood flow.      BLADDER:  A 3.4 x 2.6 x 7.3 cm lesion is at the floor of the bladder. Centrally  this is predominantly echo lucent , but blood flow is demonstrated at  the periphery. This would be most suspicious for a necrotic malignant  mass. Urological consultation recommended.      OTHER FINDINGS:  Moderate left pleural effusion.      Impression:     1. Bladder mass as described.  2. 9 mm cortical echogenicity at the right kidney, probably an  angiomyolipoma. However, CT or MRI correlation would be recommended.  3. Left pleural effusion.      Signed by: Ronni Noel 1/19/2024 10:14 AM  Dictation workstation:   [id number]      Physical Exam  General:  Pleasant and cooperative. No apparent distress.  HEENT:  Normocephalic, atraumatic, mucus membranes moist.   Neck:  Trachea midline.     Chest: no crackles or significant wheezing  CV:  Regular rate and rhythm.  Positive S1/S2.   Abdomen: Bowel sounds present in all four quadrants, abdomen is soft, non-tender, non-distended.  Extremities:  no lower extremity edema or cyanosis.   Neurological:  AAOx3. No focal deficits.  Skin:  Warm and dry.  Relevant Results  Transthoracic Echo (TTE) Complete    Result Date: 1/18/2024    [facility], [address] [place] [id number] [phone] and                                 Fax [phone] TRANSTHORACIC ECHOCARDIOGRAM REPORT  Patient Name:      ALPHONSO ESTRADA SRINIVAS Reading Physician:    [id number] Faisal Krueger MD Study Date:        1/18/2024            Ordering Provider:    [id number] DALI JEFFERY MRN/PID:           [id number]             Fellow: Accession#:        [id number]         Nurse: Date of Birth/Age: 1940 / 83 years  Sonographer:          Louise Peres                                                                [facility] Gender:            F                    Additional Staff: Height:            160.02 cm            Admit Date:           1/17/2024 Weight:            56.25 kg             Admission Status:     Observation -                                                               Priority discharge BSA:               1.58 m2              Encounter#:           [id number]                                         Department Location:  [facility] Blood Pressure: 101 /50 mmHg Study Type:    TRANSTHORACIC ECHO (TTE) COMPLETE Diagnosis/ICD: Non ST elevation (NSTEMI) myocardial infarction-I21.4 Indication:    Hypoxic CPT Code:      Echo Complete w Full Doppler-[id number] Patient History: Smoker: Current. Study Detail: The following Echo studies were performed: 2D, M-Mode, Doppler and               color flow.  PHYSICIAN INTERPRETATION: Left Ventricle: The left ventricular systolic function is mildly decreased, with an estimated ejection fraction of 40-45%. Wall motion is abnormal. The left ventricular cavity size is normal. Spectral Doppler shows a pseudonormal pattern of left ventricular diastolic filling. LV Wall Scoring: The entire anterior septum and apex are hypokinetic. Left Atrium: The left atrium is normal in size. Right Ventricle: The right ventricle is upper limits of normal in size. There is normal right ventricular global systolic function. Right Atrium: The right atrium is moderately dilated. Aortic Valve: The aortic valve is trileaflet. There is mild to moderate aortic valve cusp calcification. There is mild aortic valve thickening. There is evidence of mildly elevated transaortic gradients consistent with sclerosis of the aortic valve. There is no evidence of aortic valve regurgitation. The peak instantaneous gradient of the aortic valve is 9.7 mmHg. Mitral Valve: The mitral valve is mildly thickened. There is mild to moderate mitral valve regurgitation. Tricuspid Valve: The tricuspid valve is structurally normal.  There is trace tricuspid regurgitation. The Doppler estimated RVSP is moderately elevated at 50.5 mmHg. Pulmonic Valve: The pulmonic valve is structurally normal. There is mild pulmonic valve regurgitation. Pericardium: There is a trivial pericardial effusion. Pleural: There is a small bilateral pleural effusion. Aorta: The aortic root is normal. Systemic Veins: The inferior vena cava appears dilated. There is less than 50% IVC collapse with inspiration. In comparison to the previous echocardiogram(s): There are no prior studies on this patient for comparison purposes.  CONCLUSIONS:  1. Left ventricular systolic function is mildly decreased with a 40-45% estimated ejection fraction.  2. Entire anterior septum and apex are abnormal.  3. Spectral Doppler shows a pseudonormal pattern of left ventricular diastolic filling.  4. The right atrium is moderately dilated.  5. Mild to moderate mitral valve regurgitation.  6. Moderately elevated right ventricular systolic pressure.  7. Aortic valve sclerosis. QUANTITATIVE DATA SUMMARY: 2D MEASUREMENTS:                          Normal Ranges: Ao Root d:     2.80 cm   (2.0-3.7cm) LAs:           4.00 cm   (2.7-4.0cm) IVSd:          0.96 cm   (0.6-1.1cm) LVPWd:         0.76 cm   (0.6-1.1cm) LVIDd:         4.54 cm   (3.9-5.9cm) LVIDs:         2.77 cm LV Mass Index: 80.6 g/m2 LV % FS        39.0 % LA VOLUME:                               Normal Ranges: LA Vol A4C:        40.8 ml    (22+/-6mL/m2) LA Vol A2C:        42.5 ml LA Vol BP:         43.4 ml LA Vol Index A4C:  25.9ml/m2 LA Vol Index A2C:  27.0 ml/m2 LA Vol Index BP:   27.5 ml/m2 LA Area A4C:       15.8 cm2 LA Area A2C:       15.5 cm2 LA Major Axis A4C: 5.2 cm LA Major Axis A2C: 4.8 cm LA Volume Index:   26.2 ml/m2 LA Vol A4C:        38.6 ml LA Vol A2C:        42.5 ml RA VOLUME BY A/L METHOD:                       Normal Ranges: RA Area A4C: 17.4 cm2 M-MODE MEASUREMENTS:                  Normal Ranges: Ao Root: 3.20 cm  (2.0-3.7cm) LAs:     4.10 cm (2.7-4.0cm) AORTA MEASUREMENTS:                    Normal Ranges: Asc Ao, d: 2.90 cm (2.1-3.4cm) LV SYSTOLIC FUNCTION BY 2D PLANIMETRY (MOD):                     Normal Ranges: EF-A4C View: 61.2 % (>=55%) EF-A2C View: 58.1 % EF-Biplane:  59.4 % LV DIASTOLIC FUNCTION:                               Normal Ranges: MV Peak A:        0.91 m/s    (0.42-0.7 m/s) MV lateral e'     0.07 m/s MV medial e'      0.05 m/s MV A Dur:         137.00 msec PulmV Sys Ho:    76.90 cm/s PulmV Puckett Ho:   46.70 cm/s PulmV S/D Ho:    1.60 PulmV A Revs Ho: 25.60 cm/s PulmV A Revs Dur: 106.00 msec MITRAL VALVE:                 Normal Ranges: MV DT: 198 msec (150-240msec) AORTIC VALVE:                         Normal Ranges: AoV Vmax:      1.56 m/s (<=1.7m/s) AoV Peak P.7 mmHg (<20mmHg) LVOT Max Ho:  0.64 m/s (<=1.1m/s) LVOT VTI:      14.10 cm LVOT Diameter: 1.80 cm  (1.8-2.4cm) AoV Area,Vmax: 1.04 cm2 (2.5-4.5cm2)  RIGHT VENTRICLE: RV Basal 3.81 cm RV Mid   1.97 cm RV Major 7.7 cm TAPSE:   23.7 mm RV s'    0.11 m/s TRICUSPID VALVE/RVSP:                             Normal Ranges: Peak TR Velocity: 2.98 m/s RV Syst Pressure: 50.5 mmHg (< 30mmHg) IVC Diam:         2.00 cm Pulmonary Veins: PulmV A Revs Dur: 106.00 msec PulmV A Revs Ho: 25.60 cm/s PulmV Puckett Ho:   46.70 cm/s PulmV S/D Ho:    1.60 PulmV Sys Ho:    76.90 cm/s  [id number] Faisal Krueger MD Electronically signed on 2024 at 12:16:27 PM  Wall Scoring  ** Final **     NM Lung Perfusion    Result Date: 2024  Interpreted By:  Cynthia Islas and Maltbie Grace STUDY: NM LUNG PERFUSION;  2024 8:54 am   INDICATION: Signs/Symptoms:SOB, hypoxia, elevated d-dimer.   COMPARISON: CT chest 2024   ACCESSION NUMBER(S): [id number]   ORDERING CLINICIAN: PING POTTS   TECHNIQUE: DIVISION OF NUCLEAR MEDICINE PERFUSION LUNG SCANS   Multiple perfusion images of the lungs were acquired after the intravenous administration of 4.1 mCi of Tc-99m  macroaggregated albumin (MAA). In addition, SPECT of the chest was performed.   FINDINGS: Perfusion images of both lungs demonstrate mild heterogeneity throughout the lung fields bilaterally. No distinct wedge-shaped subsegmental or segmental perfusion defect seen on SPECT to suggest acute pulmonary embolism (low probability).       1. No distinct wedge-shaped subsegmental or segmental perfusion defect seen on SPECT to suggest acute pulmonary embolism (low probability).   The interpretation above is based on modified PIOPED II and PISAPED criteria.   I personally reviewed the images/study and I agree with the findings as stated by Nuclear Medicine fellow Sol Gil MD. This study was interpreted at [place], [place].   Signed by: Cynthia Islas 1/18/2024 10:17 AM Dictation workstation:   [id number]    CT chest wo IV contrast    Result Date: 1/18/2024  Interpreted By:  Finkelstein, Evan, STUDY: CT CHEST WO IV CONTRAST;  1/18/2024 12:59 am   INDICATION: Signs/Symptoms:New hypoxia, concern for pneumonia.   COMPARISON: Chest radiograph 01/17/2024   ACCESSION NUMBER(S): [id number]   ORDERING CLINICIAN: PING POTTS   TECHNIQUE: Axial CT images of the chest obtained  without IV contrast.  Sagittal and coronal reconstructions were created..   FINDINGS: CHEST WALL AND LOWER NECK: Within normal limits. UPPER ABDOMEN: There are vascular calcifications involving the renal vessels bilaterally along with suspected nonobstructing stones. Simple appearing cysts in the right kidney. Right suprarenal hypodensity measuring approximately 1.7 cm favored to represent an adrenal adenoma.   VESSELS: Enlarged main pulmonary artery measures up to 3.3 cm. Atherosclerotic calcifications in the aorta and coronary arteries. HEART: Cardiomegaly. No pericardial effusion. MEDIASTINUM AND HERMAN: 1 cm subcarinal lymph node. Prominent lymph nodes scattered elsewhere throughout the mediastinum as well.  LUNG, PLEURA, AND LARGE AIRWAYS: Moderate left and small right pleural effusions. Thickened interlobular septal markings. Patchy left basilar airspace opacity. Dependent atelectasis. Moderate emphysematous changes scattered throughout the lungs bilaterally. Ground-glass opacities most pronounced along the periphery of the lungs. 3 mm pleural-based nodule along the periphery of the right middle lobe, best seen image 162 of series 202. 1.1 cm nodule in the lingula image 135.   BONES: No acute osseous abnormality.       Patchy left basilar airspace opacity concerning for pneumonia. Additional dependent opacities are favored to represent superimposed atelectasis.   Ground-glass opacities most pronounced along the periphery of the lungs which may be infectious or inflammatory in etiology.   Moderate left and small right pleural effusions.   Cardiomegaly with thickened interlobular septal markings suggesting pulmonary interstitial edema.   Enlarged main pulmonary artery measures up to 3.3 cm and may be seen with pulmonary arterial hypertension.   Prominent nonspecific mediastinal lymph nodes, possibly reactive.   Scattered pulmonary nodules, largest measuring up to 1.1 cm in the lingula. Recommend follow-up imaging with CT at 3-6 months as per Fleischner criteria.   MACRO: None.   Signed by: Evan Finkelstein 1/18/2024 1:30 AM Dictation workstation:   [id number]    CT head wo IV contrast    Result Date: 1/18/2024  Interpreted By:  Finkelstein, Evan, STUDY: CT HEAD WO IV CONTRAST;  1/18/2024 12:59 am   INDICATION: Signs/Symptoms:Altered Mental Status.   COMPARISON: None.   ACCESSION NUMBER(S): [id number]   ORDERING CLINICIAN: PING POTTS   TECHNIQUE: Axial noncontrast CT images of the head with coronal and sagittal reconstructions.   FINDINGS: EXTRACRANIAL SOFT TISSUES: Unremarkable.   CALVARIUM: No depressed skull fracture. No destructive osseous lesion.   PARANASAL SINUSES/MASTOIDS: The visualized paranasal sinuses  and mastoid air cells are aerated.   HEMORRHAGE: No acute intracranial hemorrhage.   BRAIN PARENCHYMA: Gray-white matter interfaces are preserved. No mass effect or midline shift. There are nonspecific scattered white matter hypodensities.   VENTRICLES and EXTRA-AXIAL SPACES: Parenchymal atrophy with prominence of the ventricles and cortical sulci.   OTHER FINDINGS: There are calcifications within the cavernous carotids       No acute intracranial hemorrhage, mass effect or midline shift.   Nonspecific scattered white matter hypodensities favored to represent sequela of small vessel ischemia.     MACRO: None.   Signed by: Evan Finkelstein 1/18/2024 1:21 AM Dictation workstation:   [id number]    Lower extremity venous duplex bilateral    Result Date: 1/18/2024  Interpreted By:  Jerry Prieto, STUDY: [facility] LOWER EXTREMITY VENOUS DUPLEX BILATERAL;  1/18/2024 12:11 am   INDICATION: Signs/Symptoms:Concerns for PE and DVTs.   COMPARISON: None.   ACCESSION NUMBER(S): [id number]   ORDERING CLINICIAN: PING POTTS   TECHNIQUE: Grayscale, color and spectral Doppler sonographic images of the bilateral lower extremity deep venous system.   FINDINGS: RIGHT: There is normal compressibility of the common femoral vein, saphenous femoral junction, profunda femoral vein and popliteal vein. The posterior tibial and peroneal veins  demonstrate normal color flow and compressibility. There is normal spontaneous and phasic variation throughout the leg by spectral doppler.   LEFT: There is normal compressibility of the common femoral vein, saphenous femoral junction, profunda femoral vein and popliteal vein. The posterior tibial and peroneal veins  are suboptimally visualized. There is normal spontaneous and phasic variation throughout the leg by spectral doppler.   OTHER FINDINGS: None.       Suboptimal visualization of the left calf veins. No sonographic evidence DVT in the visualized vessels of bilateral lower extremities.    MACRO: None   Signed by: Jerry Prieto 1/18/2024 12:32 AM Dictation workstation:   [id number]    XR chest 1 view    Result Date: 1/17/2024  Interpreted By:  Ronni Noel, STUDY: XR CHEST 1 VIEW;  1/17/2024 3:21 pm   INDICATION: Signs/Symptoms:fatigue/SOB.   COMPARISON: None.   ACCESSION NUMBER(S): [id number]   ORDERING CLINICIAN: SERAFIN RAO   FINDINGS: CARDIOMEDIASTINAL SILHOUETTE AND VASCULATURE:   Cardiac size:  Mild cardiomegaly   Aortic shadow:  Within normal limits.   Mediastinal contours: Within normal limits.   Pulmonary vasculature:  Mild cephalization of the pulmonary blood flow suggesting mild congestion.   LUNGS: There is mild interstitial prominence probably accentuated from a somewhat shallow inspiration, but may be due to mild or early congestion. Slight opacity left lower lung is probably from atelectasis and/or fibrosis, or possibly focal infiltrate. There is blunting at the costophrenic angle that may be from a small effusion. Follow-up as clinically warranted.   ABDOMEN AND OTHER FINDINGS: No remarkable upper abdominal findings.   BONES: No acute osseous changes.       1.  Fibrosis and/or mild or early congestion and left lower lung atelectasis or infiltrate.   Signed by: Ronni Noel 1/17/2024 3:33 PM Dictation workstation:   [id number]    Results for orders placed or performed during the hospital encounter of 01/17/24 (from the past 24 hour(s))   Procalcitonin   Result Value Ref Range    Procalcitonin 0.67 (H) <=0.07 ng/mL   Procalcitonin   Result Value Ref Range    Procalcitonin 0.23 (H) <=0.07 ng/mL   POCT GLUCOSE   Result Value Ref Range    POCT Glucose 132 (H) 74 - 99 mg/dL   POCT GLUCOSE   Result Value Ref Range    POCT Glucose 151 (H) 74 - 99 mg/dL   Albumin, Urine Random   Result Value Ref Range    Albumin, Urine Random 474.2 Not established mg/L    Creatinine, Urine Random 46.5 20.0 - 320.0 mg/dL    Albumin/Creatine Ratio 1,019.8 (H) <30.0 ug/mg Creat   Protein, Urine Random    Result Value Ref Range    Total Protein, Urine Random 350 (H) 5 - 24 mg/dL    Creatinine, Urine Random 46.5 20.0 - 320.0 mg/dL    T. Protein/Creatinine Ratio 7.53 (H) 0.00 - 0.17 mg/mg Creat   Heparin Assay, UFH   Result Value Ref Range    Heparin Unfractionated 0.6 See Comment Below for Therapeutic Ranges IU/mL   Comprehensive metabolic panel   Result Value Ref Range    Glucose 130 (H) 74 - 99 mg/dL    Sodium 144 136 - 145 mmol/L    Potassium 4.6 3.5 - 5.3 mmol/L    Chloride 102 98 - 107 mmol/L    Bicarbonate 33 (H) 21 - 32 mmol/L    Anion Gap 14 10 - 20 mmol/L    Urea Nitrogen 48 (H) 6 - 23 mg/dL    Creatinine 1.37 (H) 0.50 - 1.05 mg/dL    eGFR 38 (L) >60 mL/min/1.73m*2    Calcium 8.7 8.6 - 10.3 mg/dL    Albumin 3.7 3.4 - 5.0 g/dL    Alkaline Phosphatase 72 33 - 136 U/L    Total Protein 6.0 (L) 6.4 - 8.2 g/dL    AST 39 9 - 39 U/L    Bilirubin, Total 0.3 0.0 - 1.2 mg/dL     (H) 7 - 45 U/L   CBC   Result Value Ref Range    WBC 9.8 4.4 - 11.3 x10*3/uL    nRBC 0.7 (H) 0.0 - 0.0 /100 WBCs    RBC 3.89 (L) 4.00 - 5.20 x10*6/uL    Hemoglobin 10.6 (L) 12.0 - 16.0 g/dL    Hematocrit 35.8 (L) 36.0 - 46.0 %    MCV 92 80 - 100 fL    MCH 27.2 26.0 - 34.0 pg    MCHC 29.6 (L) 32.0 - 36.0 g/dL    RDW 15.7 (H) 11.5 - 14.5 %    Platelets 253 150 - 450 x10*3/uL   Renal function panel   Result Value Ref Range    Glucose 130 (H) 74 - 99 mg/dL    Sodium 144 136 - 145 mmol/L    Potassium 4.6 3.5 - 5.3 mmol/L    Chloride 102 98 - 107 mmol/L    Bicarbonate 33 (H) 21 - 32 mmol/L    Anion Gap 14 10 - 20 mmol/L    Urea Nitrogen 48 (H) 6 - 23 mg/dL    Creatinine 1.37 (H) 0.50 - 1.05 mg/dL    eGFR 38 (L) >60 mL/min/1.73m*2    Calcium 8.7 8.6 - 10.3 mg/dL    Phosphorus 5.1 (H) 2.5 - 4.9 mg/dL    Albumin 3.7 3.4 - 5.0 g/dL   POCT GLUCOSE   Result Value Ref Range    POCT Glucose 126 (H) 74 - 99 mg/dL    Scheduled medications  [Held by provider] amLODIPine, 2.5 mg, oral, Daily  aspirin, 81 mg, oral, Daily  atorvastatin, 40 mg, oral,  Nightly  azithromycin, 500 mg, intravenous, q24h  budesonide, 0.5 mg, nebulization, BID  cefTRIAXone, 1 g, intravenous, q24h  furosemide, 20 mg, intravenous, q12h  insulin lispro, 0-5 Units, subcutaneous, TID  ipratropium-albuteroL, 3 mL, nebulization, TID  iron sucrose, 300 mg, intravenous, Daily  levothyroxine, 75 mcg, oral, Daily  methylPREDNISolone sodium succinate (PF), 20 mg, intravenous, q12h  pantoprazole, 40 mg, oral, Daily before breakfast  polyethylene glycol, 17 g, oral, Daily      Continuous medications  heparin, 0-4,500 Units/hr, Last Rate: 1,000 Units/hr (01/18/24 1630)      PRN medications  PRN medications: acetaminophen **OR** acetaminophen, dextrose 10 % in water (D10W), dextrose, glucagon, heparin, ipratropium-albuteroL        Assessment/Plan    Susannah Oshea is a 83-year-old female with past medical history of HTN, HLD, hypothyroid, CKD 3, type II DM, presented to hospital with 1 week history of confusion and a mechanical fall.  She is being managed to medical service for evaluation management of acute hypoxic respiratory failure and other multiple medical issues.    # Acute hypoxic respiratory failure  # Left lower lobe pneumonia  # Left lower lobe atelectasis  # Acute exacerbation of COPD  # Scattered pulmonary nodules  - We will continue treatment of pneumonia with Rocephin day 2 and azithromycin day 2.  CT chest showed findings confirming small left lower lobe pneumonia with small bilateral pleural effusions.  Pulmonology is following.  Patient likely has a COPD exacerbation secondary to pneumonia.  We will continue with IV Solu-Medrol 20 mg twice daily along with respiratory bronchopulmonary hygiene along with DuoNebs, Pulmicort as advised by pulm.  Pulm team advised no indication for thoracentesis at this stage.  If the effusions worsen they will consider thoracentesis.  Effusions right now are very small.  - We will wait pneumonia workup for Legionella urinary antigen, strep antigen,  mycoplasma IgM, procalcitonin level along with MRSA swab.    # NSTEMI  # Elevated troponin  # HFrEF  # Moderate aortic stenosis  - Patient did not complain of any central chest pain, however, troponins were elevated at 556 to 769 to 663.  Cardiology is following.  They advised to continue diuresis with IV 20 mg Lasix.  Continue with aspirin and maintain heparin drip for total of 48 hours.  She has agreed to undergo cardiac catheterization today.  We will also start patient on high intensity statin once LFTs returned..  Cardiology continues to follow.  Will continue with judicious diuresis.  - We will stop heparin infusion at 4:40 PM on 1/19/2024 after completion of 48 hours of treatment  - Echo today showed 40 to 45% ejection fraction and moderate aortic stenosis  - Because patient has moderate aortic stenosis, it is extremely important to ensure patient does not become hypotensive.  She is preload dependent and if she is diuresed too much she can become hypotensive.  We will monitor blood pressures very closely.    # LAUREN, likely prerenal  # Hyperkalemia-resolved  - We believe that the patient is not clinically volume overloaded.  There is no evidence of peripheral edema and elevated JVD.  As the patient has aortic stenosis findings on echo along with reduced EF, she is preload dependent.  Fattening did improve with one-time push of IV Lasix 20 mg.  We must carefully use IV Lasix at low doses as the patient could suddenly become hypoperfused.  We will give IV Lasix 20 mg twice daily. Creatinine continues to improve.  - Nephrology following: Want to determine extent of diabetic nephropathy with appropriate tests including urine albumin, protein, creatinine.  They will order renal ultrasound also.  Avoid nephrotoxic agents.  Will maintain strict MAGY's and will fluid restrict 1.5 L.  Continue to monitor BMP.    # Acute anemia  - Patient has had a sudden drop in hemoglobin from her baseline 14.  Globin has improved  somewhat today iron level is low.  Iron deficient anemia and elderly is CRC until proven otherwise.  We may schedule an outpatient colonoscopy for further investigation.  IV Venofer has been given.  We will continue to monitor hemoglobin.  If hemoglobin less than 7, we will transfuse PRC.  He is also on aspirin and heparin.  Must be careful and continue to monitor.    # Hematuria  - Has had a 2-year chronic history of hematuria.  Ultrasound renal showed large bladder mass but no hydronephrosis.  Urology consult advised to plan for outpatient cystoscopy B/L RGP TURBT as outpatient.  No acute surgical invention planned for this admission.    # Transaminitis  - This is likely secondary to acute infective process and possibly heart failure.  LFTs are downtrending.  Will initiate a high intensity statin.    # HTN  # Hypothyroid  # Type II DM  - We will hold amlodipine for now.  Will continue Synthroid.  Will place patient on lispro sliding scale to ensure blood sugars are well-controlled.    - DVT PPx: Currently on heparin drip-stop heparin drip at 4:40 PM today 1/19/2024    Clarisa Varma MD  PGY-1 Internal Medicine

## 2024-01-20 LAB
ALBUMIN SERPL BCP-MCNC: 3.3 G/DL (ref 3.4–5)
ANION GAP SERPL CALC-SCNC: 10 MMOL/L (ref 10–20)
BUN SERPL-MCNC: 35 MG/DL (ref 6–23)
CALCIUM SERPL-MCNC: 8 MG/DL (ref 8.6–10.3)
CHLORIDE SERPL-SCNC: 101 MMOL/L (ref 98–107)
CO2 SERPL-SCNC: 34 MMOL/L (ref 21–32)
CREAT SERPL-MCNC: 1.09 MG/DL (ref 0.5–1.05)
CREAT UR-MCNC: 123.3 MG/DL (ref 20–320)
EGFRCR SERPLBLD CKD-EPI 2021: 50 ML/MIN/1.73M*2
ERYTHROCYTE [DISTWIDTH] IN BLOOD BY AUTOMATED COUNT: 15.8 % (ref 11.5–14.5)
GLUCOSE BLD MANUAL STRIP-MCNC: 148 MG/DL (ref 74–99)
GLUCOSE BLD MANUAL STRIP-MCNC: 188 MG/DL (ref 74–99)
GLUCOSE BLD MANUAL STRIP-MCNC: 190 MG/DL (ref 74–99)
GLUCOSE BLD MANUAL STRIP-MCNC: 197 MG/DL (ref 74–99)
GLUCOSE BLD MANUAL STRIP-MCNC: 90 MG/DL (ref 74–99)
GLUCOSE SERPL-MCNC: 72 MG/DL (ref 74–99)
HCT VFR BLD AUTO: 31.8 % (ref 36–46)
HGB BLD-MCNC: 9.3 G/DL (ref 12–16)
LEGIONELLA AG UR QL: NEGATIVE
MCH RBC QN AUTO: 27.1 PG (ref 26–34)
MCHC RBC AUTO-ENTMCNC: 29.2 G/DL (ref 32–36)
MCV RBC AUTO: 93 FL (ref 80–100)
MICROALBUMIN UR-MCNC: 451.1 MG/L
MICROALBUMIN/CREAT UR: 365.9 UG/MG CREAT
NRBC BLD-RTO: 0.8 /100 WBCS (ref 0–0)
PHOSPHATE SERPL-MCNC: 3.4 MG/DL (ref 2.5–4.9)
PLATELET # BLD AUTO: 230 X10*3/UL (ref 150–450)
POTASSIUM SERPL-SCNC: 3.9 MMOL/L (ref 3.5–5.3)
RBC # BLD AUTO: 3.43 X10*6/UL (ref 4–5.2)
S PNEUM AG UR QL: NEGATIVE
SODIUM SERPL-SCNC: 141 MMOL/L (ref 136–145)
WBC # BLD AUTO: 10 X10*3/UL (ref 4.4–11.3)

## 2024-01-20 PROCEDURE — 2500000002 HC RX 250 W HCPCS SELF ADMINISTERED DRUGS (ALT 637 FOR MEDICARE OP, ALT 636 FOR OP/ED): Performed by: INTERNAL MEDICINE

## 2024-01-20 PROCEDURE — 82043 UR ALBUMIN QUANTITATIVE: CPT | Performed by: INTERNAL MEDICINE

## 2024-01-20 PROCEDURE — 92610 EVALUATE SWALLOWING FUNCTION: CPT | Mod: GN

## 2024-01-20 PROCEDURE — 94640 AIRWAY INHALATION TREATMENT: CPT | Mod: MUE

## 2024-01-20 PROCEDURE — 2500000004 HC RX 250 GENERAL PHARMACY W/ HCPCS (ALT 636 FOR OP/ED): Performed by: STUDENT IN AN ORGANIZED HEALTH CARE EDUCATION/TRAINING PROGRAM

## 2024-01-20 PROCEDURE — 2500000001 HC RX 250 WO HCPCS SELF ADMINISTERED DRUGS (ALT 637 FOR MEDICARE OP): Performed by: INTERNAL MEDICINE

## 2024-01-20 PROCEDURE — 2500000004 HC RX 250 GENERAL PHARMACY W/ HCPCS (ALT 636 FOR OP/ED): Mod: MUE | Performed by: INTERNAL MEDICINE

## 2024-01-20 PROCEDURE — 2500000005 HC RX 250 GENERAL PHARMACY W/O HCPCS: Performed by: INTERNAL MEDICINE

## 2024-01-20 PROCEDURE — 84100 ASSAY OF PHOSPHORUS: CPT | Performed by: INTERNAL MEDICINE

## 2024-01-20 PROCEDURE — 99232 SBSQ HOSP IP/OBS MODERATE 35: CPT | Performed by: STUDENT IN AN ORGANIZED HEALTH CARE EDUCATION/TRAINING PROGRAM

## 2024-01-20 PROCEDURE — 82947 ASSAY GLUCOSE BLOOD QUANT: CPT

## 2024-01-20 PROCEDURE — 2500000004 HC RX 250 GENERAL PHARMACY W/ HCPCS (ALT 636 FOR OP/ED): Performed by: INTERNAL MEDICINE

## 2024-01-20 PROCEDURE — 85027 COMPLETE CBC AUTOMATED: CPT | Performed by: INTERNAL MEDICINE

## 2024-01-20 PROCEDURE — 94640 AIRWAY INHALATION TREATMENT: CPT

## 2024-01-20 PROCEDURE — 1200000002 HC GENERAL ROOM WITH TELEMETRY DAILY

## 2024-01-20 PROCEDURE — 99233 SBSQ HOSP IP/OBS HIGH 50: CPT

## 2024-01-20 PROCEDURE — 2500000004 HC RX 250 GENERAL PHARMACY W/ HCPCS (ALT 636 FOR OP/ED)

## 2024-01-20 PROCEDURE — 36415 COLL VENOUS BLD VENIPUNCTURE: CPT | Performed by: INTERNAL MEDICINE

## 2024-01-20 RX ORDER — FUROSEMIDE 10 MG/ML
20 INJECTION INTRAMUSCULAR; INTRAVENOUS 2 TIMES DAILY
Status: DISCONTINUED | OUTPATIENT
Start: 2024-01-20 | End: 2024-01-21

## 2024-01-20 RX ORDER — ENOXAPARIN SODIUM 100 MG/ML
40 INJECTION SUBCUTANEOUS ONCE
Status: COMPLETED | OUTPATIENT
Start: 2024-01-20 | End: 2024-01-20

## 2024-01-20 RX ADMIN — ASPIRIN 81 MG: 81 TABLET, CHEWABLE ORAL at 08:57

## 2024-01-20 RX ADMIN — PANTOPRAZOLE SODIUM 40 MG: 40 TABLET, DELAYED RELEASE ORAL at 06:09

## 2024-01-20 RX ADMIN — ATORVASTATIN CALCIUM 40 MG: 40 TABLET, FILM COATED ORAL at 21:05

## 2024-01-20 RX ADMIN — CLOPIDOGREL 75 MG: 75 TABLET ORAL at 08:57

## 2024-01-20 RX ADMIN — LEVOTHYROXINE SODIUM 75 MCG: 0.07 TABLET ORAL at 08:57

## 2024-01-20 RX ADMIN — CEFTRIAXONE SODIUM 1 G: 1 INJECTION, SOLUTION INTRAVENOUS at 21:04

## 2024-01-20 RX ADMIN — INSULIN LISPRO 1 UNITS: 100 INJECTION, SOLUTION INTRAVENOUS; SUBCUTANEOUS at 21:08

## 2024-01-20 RX ADMIN — IPRATROPIUM BROMIDE AND ALBUTEROL SULFATE 3 ML: .5; 3 SOLUTION RESPIRATORY (INHALATION) at 19:45

## 2024-01-20 RX ADMIN — POLYETHYLENE GLYCOL 3350 17 G: 17 POWDER, FOR SOLUTION ORAL at 08:57

## 2024-01-20 RX ADMIN — BUDESONIDE INHALATION 0.5 MG: 0.5 SUSPENSION RESPIRATORY (INHALATION) at 06:29

## 2024-01-20 RX ADMIN — INSULIN LISPRO 1 UNITS: 100 INJECTION, SOLUTION INTRAVENOUS; SUBCUTANEOUS at 14:55

## 2024-01-20 RX ADMIN — IRON SUCROSE 300 MG: 20 INJECTION, SOLUTION INTRAVENOUS at 06:10

## 2024-01-20 RX ADMIN — BUDESONIDE INHALATION 0.5 MG: 0.5 SUSPENSION RESPIRATORY (INHALATION) at 19:45

## 2024-01-20 RX ADMIN — FUROSEMIDE 20 MG: 10 INJECTION, SOLUTION INTRAMUSCULAR; INTRAVENOUS at 11:29

## 2024-01-20 RX ADMIN — METHYLPREDNISOLONE SODIUM SUCCINATE 20 MG: 40 INJECTION, POWDER, FOR SOLUTION INTRAMUSCULAR; INTRAVENOUS at 17:09

## 2024-01-20 RX ADMIN — METHYLPREDNISOLONE SODIUM SUCCINATE 20 MG: 40 INJECTION, POWDER, FOR SOLUTION INTRAMUSCULAR; INTRAVENOUS at 06:11

## 2024-01-20 RX ADMIN — IPRATROPIUM BROMIDE AND ALBUTEROL SULFATE 3 ML: .5; 3 SOLUTION RESPIRATORY (INHALATION) at 06:29

## 2024-01-20 RX ADMIN — ENOXAPARIN SODIUM 40 MG: 40 INJECTION SUBCUTANEOUS at 23:27

## 2024-01-20 RX ADMIN — FUROSEMIDE 20 MG: 10 INJECTION, SOLUTION INTRAMUSCULAR; INTRAVENOUS at 23:25

## 2024-01-20 RX ADMIN — IPRATROPIUM BROMIDE AND ALBUTEROL SULFATE 3 ML: .5; 3 SOLUTION RESPIRATORY (INHALATION) at 12:23

## 2024-01-20 ASSESSMENT — PAIN - FUNCTIONAL ASSESSMENT
PAIN_FUNCTIONAL_ASSESSMENT: 0-10

## 2024-01-20 ASSESSMENT — PAIN SCALES - GENERAL
PAINLEVEL_OUTOF10: 0 - NO PAIN

## 2024-01-20 NOTE — PROGRESS NOTES
Lupe Oshea is a 83 y.o. female on day 3 of admission presenting with AMS (altered mental status).      Subjective   Patient was seen at bedside today.  No overnight events.  Still on 5.5 L nasal cannula oxygen.  Saturating well.       Objective     Last Recorded Vitals  /51   Pulse 90   Temp 35.7 °C (96.3 °F)   Resp 17   Wt 56.2 kg (123 lb 14.4 oz)   SpO2 92%   Intake/Output last 3 Shifts:    Intake/Output Summary (Last 24 hours) at 1/20/2024 1421  Last data filed at 1/20/2024 0900  Gross per 24 hour   Intake 2122.35 ml   Output 1601 ml   Net 521.35 ml         Admission Weight  Weight: 56.2 kg (123 lb 14.4 oz) (01/17/24 1108)    Daily Weight  01/18/24 : 56.2 kg (123 lb 14.4 oz)    Image Results  Cardiac catheterization - coronary     [facility], Cath Lab, [address] [place] [address]    Cardiovascular Catheterization Report    Patient Name:      LUPE OSHEA Performing Physician:  Debra Pablo MD  Study Date:        1/19/2024            Verifying Physician:   Debra Pablo MD  MRN/PID:           [id number]             Cardiologist/Co-scrub:  Accession#:        [id number]         Ordering Provider:     Debra PABLO  Date of Birth/Age: 1940 / 83 years  Fellow:  Gender:            F                    Fellow:  Encounter#:        [id number]       Study: Left Heart Cath       Indications:  LUPE OSHEA is a 84 year old female who presents with coronary artery disease, dyslipidemia, hypertension, chronic pulmonary disease and an anginal equivalent chest pain assessment (i.e. dyspnea on exertion believed to be from ischemia). NSTE - ACS.     Appropriate Use Criteria:  Non ST elevation myocardial infarction with intermediate risk score; AUC score  = 8.     Procedure Description:  After infiltration with 2% Lidocaine, the right radial artery was cannulated with a modified Seldinger technique. Subsequently a 6 Yakut sheath was placed retrograde in the right radial artery. Selective coronary catheterization was performed using a 5 Fr catheter(s) exchanged over a guide wire to cannulate the coronary arteries.  Additional catheter(s) used to visualize the coronary arteries were: Tiger 4.0. Multiple injections of contrast were made into the left and right coronary arteries with angiograms recorded in multiple projections. After completion of the procedure, the arterial sheath was pulled and a TR Band Radial Compression Device was utilized to obtain patent hemostasis.     Coronary Angiography:  The coronary circulation is right dominant.     Coronary Angiography Comments:  Left main coronary artery contains mild luminal irregularities.    Left anterior descending artery contains a 70% stenosis proximally. There is a total occlusion in its midportion after the takeoff of the septal artery. There are left to left collaterals supplying the first diagonal artery. The right to left collaterals supplying the second diagonal artery.    Left circumflex artery contains a 30% stenosis ostially. The obtuse marginal artery contains mild luminal irregularities.    The ramus intermedius is a diminutive vessel with mild luminal irregularities.    The right coronary artery contains a 30% stenosis in its midportion.  Posterior descending artery contains an 80% stenosis in the mid PDA. Posterolateral branch contains mild luminal irregularities.         Left Ventriculography:  EDP 16.     Hemo Personnel:  +--------------------+---------+  Name                Duty       +--------------------+---------+  Ronni Muro MD 1  +--------------------+---------+       Hemodynamic Pressures:      +----+-------------------+---------+------------+-------------+------+---------+  Site     Date Time       Phase    Systolic    Diastolic    ED  Mean mmHg                           Name       mmHg        mmHg      mmHg            +----+-------------------+---------+------------+-------------+------+---------+    AO  1/19/2024 1:14:10  O2 REST         109           45             70                       PM                                                   +----+-------------------+---------+------------+-------------+------+---------+    LV  1/19/2024 1:23:43  O2 REST          94           10    16                                PM                                                   +----+-------------------+---------+------------+-------------+------+---------+    LV  1/19/2024 1:23:52  O2 REST          89            8    16                                PM                                                   +----+-------------------+---------+------------+-------------+------+---------+   LVp  1/19/2024 1:23:57  O2 REST          99           10    19                                PM                                                   +----+-------------------+---------+------------+-------------+------+---------+   AOp  1/19/2024 1:24:02  O2 REST          92           40             61                       PM                                                   +----+-------------------+---------+------------+-------------+------+---------+       Complications:  No in-lab complications observed.     Cardiac Cath Post Procedure Notes:  Post Procedure Diagnosis: CAD.  Blood Loss:               Estimated blood loss during the procedure was < 10                            mls.  Specimens Removed:        Number of specimen(s) removed: none.       Recommendations:  Maximize medical therapy.  Lipid lowering agent or Statin  therapy.  Further recommendations per Dr. Krueger.  Aspirin therapy.    ____________________________________________________________________________________  CONCLUSIONS:   1. Total occlusion of the mid LAD with collateralization to the first and second diagonal arteries.   2. Mild circumflex and RCA disease.   3. Severe branch vessel disease of the PDA.   4. Mildly elevated left heart filling pressures.    ICD 10 Codes:  Non ST elevation (NSTEMI) myocardial infarction-I21.4     CPT Codes:  Left Heart Cath (visualization of coronaries) and LV-40454; Moderate Sedation Services 1st additional 15 minutes patient >5 years-[id number]     [id number] Ronni Muro MD  Performing Physician  Electronically signed by [id number] Ronni Muro MD on 1/19/2024 at 1:40:40 PM         ** Final **  US renal complete  Narrative: Interpreted By:  Ronni Noel,   STUDY:  US RENAL COMPLETE;  1/18/2024 6:42 pm      INDICATION:  Signs/Symptoms:looking for evidence of chronicity of kidney disease.      COMPARISON:  None.      ACCESSION NUMBER(S):  [id number]      ORDERING CLINICIAN:  CEDRICK ESPINAL      TECHNIQUE:  Multiple images of the kidneys were obtained  , with color Doppler  for blood flow.  It is noted the exam was limited due to patient  habitus.      FINDINGS:  RIGHT KIDNEY:  The right kidney measures 7.1 cm in length.  There is mild cortical  thinning and atrophy. A 2.5 cm cortical cyst is present at the  midpole. A 2 cm cyst is present at the upper pole. There is a 2.7 cm  parapelvic cyst inferiorly.. A 9 mm cortical echogenicity is most  likely an angiomyolipoma. However, an echogenic renal cell carcinoma  is not completely excluded. Therefore, correlation with CT or MRI  would be recommended for more definitive demonstration of fat and  confirmation of angiomyolipoma. No hydronephrosis or evidence of  nephrolithiasis. Color Doppler demonstrates renal blood flow.      LEFT KIDNEY:  The left kidney measures 8.6 cm in length.  There is  also mild  cortical thinning and atrophy..   No hydronephrosis or evidence of  nephrolithiasis.. Color Doppler demonstrates renal blood flow.      BLADDER:  A 3.4 x 2.6 x 7.3 cm lesion is at the floor of the bladder. Centrally  this is predominantly echo lucent , but blood flow is demonstrated at  the periphery. This would be most suspicious for a necrotic malignant  mass. Urological consultation recommended.      OTHER FINDINGS:  Moderate left pleural effusion.      Impression:     1. Bladder mass as described.  2. 9 mm cortical echogenicity at the right kidney, probably an  angiomyolipoma. However, CT or MRI correlation would be recommended.  3. Left pleural effusion.      Signed by: Ronni Noel 1/19/2024 10:14 AM  Dictation workstation:   [id number]      Physical Exam  General:  Pleasant and cooperative. No apparent distress.  HEENT:  Normocephalic, atraumatic, mucus membranes moist.   Neck:  Trachea midline.     Chest: no crackles or significant wheezing  CV:  Regular rate and rhythm.  Positive S1/S2.   Abdomen: Bowel sounds present in all four quadrants, abdomen is soft, non-tender, non-distended.  Extremities:  no lower extremity edema or cyanosis.   Neurological:  AAOx3. No focal deficits.  Skin:  Warm and dry.  Relevant Results  Transthoracic Echo (TTE) Complete    Result Date: 1/18/2024    [facility], [address] [place] [address] [id number] [phone] and                                 Fax [phone] TRANSTHORACIC ECHOCARDIOGRAM REPORT  Patient Name:      ALPHONSO ESTRADA SRINIVAS Reading Physician:    [id number] Faisal Krueger MD Study Date:        1/18/2024            Ordering Provider:    [id number] DALI JEFFERY MRN/PID:           [id number]             Fellow: Accession#:        [id number]         Nurse: Date of Birth/Age: 1940 / 83 years  Sonographer:          Louise  Ja                                                               [facility] Gender:            F                    Additional Staff: Height:            160.02 cm            Admit Date:           1/17/2024 Weight:            56.25 kg             Admission Status:     Observation -                                                               Priority discharge BSA:               1.58 m2              Encounter#:           [id number]                                         Department Location:  [facility] Blood Pressure: 101 /50 mmHg Study Type:    TRANSTHORACIC ECHO (TTE) COMPLETE Diagnosis/ICD: Non ST elevation (NSTEMI) myocardial infarction-I21.4 Indication:    Hypoxic CPT Code:      Echo Complete w Full Doppler-06540 Patient History: Smoker: Current. Study Detail: The following Echo studies were performed: 2D, M-Mode, Doppler and               color flow.  PHYSICIAN INTERPRETATION: Left Ventricle: The left ventricular systolic function is mildly decreased, with an estimated ejection fraction of 40-45%. Wall motion is abnormal. The left ventricular cavity size is normal. Spectral Doppler shows a pseudonormal pattern of left ventricular diastolic filling. LV Wall Scoring: The entire anterior septum and apex are hypokinetic. Left Atrium: The left atrium is normal in size. Right Ventricle: The right ventricle is upper limits of normal in size. There is normal right ventricular global systolic function. Right Atrium: The right atrium is moderately dilated. Aortic Valve: The aortic valve is trileaflet. There is mild to moderate aortic valve cusp calcification. There is mild aortic valve thickening. There is evidence of mildly elevated transaortic gradients consistent with sclerosis of the aortic valve. There is no evidence of aortic valve regurgitation. The peak instantaneous gradient of the aortic valve is 9.7 mmHg. Mitral Valve: The mitral valve is mildly thickened. There is mild to moderate mitral valve  regurgitation. Tricuspid Valve: The tricuspid valve is structurally normal. There is trace tricuspid regurgitation. The Doppler estimated RVSP is moderately elevated at 50.5 mmHg. Pulmonic Valve: The pulmonic valve is structurally normal. There is mild pulmonic valve regurgitation. Pericardium: There is a trivial pericardial effusion. Pleural: There is a small bilateral pleural effusion. Aorta: The aortic root is normal. Systemic Veins: The inferior vena cava appears dilated. There is less than 50% IVC collapse with inspiration. In comparison to the previous echocardiogram(s): There are no prior studies on this patient for comparison purposes.  CONCLUSIONS:  1. Left ventricular systolic function is mildly decreased with a 40-45% estimated ejection fraction.  2. Entire anterior septum and apex are abnormal.  3. Spectral Doppler shows a pseudonormal pattern of left ventricular diastolic filling.  4. The right atrium is moderately dilated.  5. Mild to moderate mitral valve regurgitation.  6. Moderately elevated right ventricular systolic pressure.  7. Aortic valve sclerosis. QUANTITATIVE DATA SUMMARY: 2D MEASUREMENTS:                          Normal Ranges: Ao Root d:     2.80 cm   (2.0-3.7cm) LAs:           4.00 cm   (2.7-4.0cm) IVSd:          0.96 cm   (0.6-1.1cm) LVPWd:         0.76 cm   (0.6-1.1cm) LVIDd:         4.54 cm   (3.9-5.9cm) LVIDs:         2.77 cm LV Mass Index: 80.6 g/m2 LV % FS        39.0 % LA VOLUME:                               Normal Ranges: LA Vol A4C:        40.8 ml    (22+/-6mL/m2) LA Vol A2C:        42.5 ml LA Vol BP:         43.4 ml LA Vol Index A4C:  25.9ml/m2 LA Vol Index A2C:  27.0 ml/m2 LA Vol Index BP:   27.5 ml/m2 LA Area A4C:       15.8 cm2 LA Area A2C:       15.5 cm2 LA Major Axis A4C: 5.2 cm LA Major Axis A2C: 4.8 cm LA Volume Index:   26.2 ml/m2 LA Vol A4C:        38.6 ml LA Vol A2C:        42.5 ml RA VOLUME BY A/L METHOD:                       Normal Ranges: RA Area A4C: 17.4 cm2  M-MODE MEASUREMENTS:                  Normal Ranges: Ao Root: 3.20 cm (2.0-3.7cm) LAs:     4.10 cm (2.7-4.0cm) AORTA MEASUREMENTS:                    Normal Ranges: Asc Ao, d: 2.90 cm (2.1-3.4cm) LV SYSTOLIC FUNCTION BY 2D PLANIMETRY (MOD):                     Normal Ranges: EF-A4C View: 61.2 % (>=55%) EF-A2C View: 58.1 % EF-Biplane:  59.4 % LV DIASTOLIC FUNCTION:                               Normal Ranges: MV Peak A:        0.91 m/s    (0.42-0.7 m/s) MV lateral e'     0.07 m/s MV medial e'      0.05 m/s MV A Dur:         137.00 msec PulmV Sys Ho:    76.90 cm/s PulmV Puckett Ho:   46.70 cm/s PulmV S/D Ho:    1.60 PulmV A Revs Ho: 25.60 cm/s PulmV A Revs Dur: 106.00 msec MITRAL VALVE:                 Normal Ranges: MV DT: 198 msec (150-240msec) AORTIC VALVE:                         Normal Ranges: AoV Vmax:      1.56 m/s (<=1.7m/s) AoV Peak P.7 mmHg (<20mmHg) LVOT Max Ho:  0.64 m/s (<=1.1m/s) LVOT VTI:      14.10 cm LVOT Diameter: 1.80 cm  (1.8-2.4cm) AoV Area,Vmax: 1.04 cm2 (2.5-4.5cm2)  RIGHT VENTRICLE: RV Basal 3.81 cm RV Mid   1.97 cm RV Major 7.7 cm TAPSE:   23.7 mm RV s'    0.11 m/s TRICUSPID VALVE/RVSP:                             Normal Ranges: Peak TR Velocity: 2.98 m/s RV Syst Pressure: 50.5 mmHg (< 30mmHg) IVC Diam:         2.00 cm Pulmonary Veins: PulmV A Revs Dur: 106.00 msec PulmV A Revs Ho: 25.60 cm/s PulmV Puckett Ho:   46.70 cm/s PulmV S/D Ho:    1.60 PulmV Sys Ho:    76.90 cm/s  [id number] Faisal Krueger MD Electronically signed on 2024 at 12:16:27 PM  Wall Scoring  ** Final **     NM Lung Perfusion    Result Date: 2024  Interpreted By:  Cynthia Islas and Maltbie Grace STUDY: NM LUNG PERFUSION;  2024 8:54 am   INDICATION: Signs/Symptoms:SOB, hypoxia, elevated d-dimer.   COMPARISON: CT chest 2024   ACCESSION NUMBER(S): [id number]   ORDERING CLINICIAN: PING POTTS   TECHNIQUE: DIVISION OF NUCLEAR MEDICINE PERFUSION LUNG SCANS   Multiple perfusion images of the lungs were  acquired after the intravenous administration of 4.1 mCi of Tc-99m macroaggregated albumin (MAA). In addition, SPECT of the chest was performed.   FINDINGS: Perfusion images of both lungs demonstrate mild heterogeneity throughout the lung fields bilaterally. No distinct wedge-shaped subsegmental or segmental perfusion defect seen on SPECT to suggest acute pulmonary embolism (low probability).       1. No distinct wedge-shaped subsegmental or segmental perfusion defect seen on SPECT to suggest acute pulmonary embolism (low probability).   The interpretation above is based on modified PIOPED II and PISAPED criteria.   I personally reviewed the images/study and I agree with the findings as stated by Nuclear Medicine fellow Sol Gil MD. This study was interpreted at [place], [place].   Signed by: Cynthia Islas 1/18/2024 10:17 AM Dictation workstation:   [id number]    CT chest wo IV contrast    Result Date: 1/18/2024  Interpreted By:  Finkelstein, Evan, STUDY: CT CHEST WO IV CONTRAST;  1/18/2024 12:59 am   INDICATION: Signs/Symptoms:New hypoxia, concern for pneumonia.   COMPARISON: Chest radiograph 01/17/2024   ACCESSION NUMBER(S): [id number]   ORDERING CLINICIAN: PING POTTS   TECHNIQUE: Axial CT images of the chest obtained  without IV contrast.  Sagittal and coronal reconstructions were created..   FINDINGS: CHEST WALL AND LOWER NECK: Within normal limits. UPPER ABDOMEN: There are vascular calcifications involving the renal vessels bilaterally along with suspected nonobstructing stones. Simple appearing cysts in the right kidney. Right suprarenal hypodensity measuring approximately 1.7 cm favored to represent an adrenal adenoma.   VESSELS: Enlarged main pulmonary artery measures up to 3.3 cm. Atherosclerotic calcifications in the aorta and coronary arteries. HEART: Cardiomegaly. No pericardial effusion. MEDIASTINUM AND HERMAN: 1 cm subcarinal lymph node. Prominent  lymph nodes scattered elsewhere throughout the mediastinum as well. LUNG, PLEURA, AND LARGE AIRWAYS: Moderate left and small right pleural effusions. Thickened interlobular septal markings. Patchy left basilar airspace opacity. Dependent atelectasis. Moderate emphysematous changes scattered throughout the lungs bilaterally. Ground-glass opacities most pronounced along the periphery of the lungs. 3 mm pleural-based nodule along the periphery of the right middle lobe, best seen image 162 of series 202. 1.1 cm nodule in the lingula image 135.   BONES: No acute osseous abnormality.       Patchy left basilar airspace opacity concerning for pneumonia. Additional dependent opacities are favored to represent superimposed atelectasis.   Ground-glass opacities most pronounced along the periphery of the lungs which may be infectious or inflammatory in etiology.   Moderate left and small right pleural effusions.   Cardiomegaly with thickened interlobular septal markings suggesting pulmonary interstitial edema.   Enlarged main pulmonary artery measures up to 3.3 cm and may be seen with pulmonary arterial hypertension.   Prominent nonspecific mediastinal lymph nodes, possibly reactive.   Scattered pulmonary nodules, largest measuring up to 1.1 cm in the lingula. Recommend follow-up imaging with CT at 3-6 months as per Fleischner criteria.   MACRO: None.   Signed by: Evan Finkelstein 1/18/2024 1:30 AM Dictation workstation:   [id number]    CT head wo IV contrast    Result Date: 1/18/2024  Interpreted By:  Finkelstein, Evan, STUDY: CT HEAD WO IV CONTRAST;  1/18/2024 12:59 am   INDICATION: Signs/Symptoms:Altered Mental Status.   COMPARISON: None.   ACCESSION NUMBER(S): [id number]   ORDERING CLINICIAN: PING POTTS   TECHNIQUE: Axial noncontrast CT images of the head with coronal and sagittal reconstructions.   FINDINGS: EXTRACRANIAL SOFT TISSUES: Unremarkable.   CALVARIUM: No depressed skull fracture. No destructive osseous  lesion.   PARANASAL SINUSES/MASTOIDS: The visualized paranasal sinuses and mastoid air cells are aerated.   HEMORRHAGE: No acute intracranial hemorrhage.   BRAIN PARENCHYMA: Gray-white matter interfaces are preserved. No mass effect or midline shift. There are nonspecific scattered white matter hypodensities.   VENTRICLES and EXTRA-AXIAL SPACES: Parenchymal atrophy with prominence of the ventricles and cortical sulci.   OTHER FINDINGS: There are calcifications within the cavernous carotids       No acute intracranial hemorrhage, mass effect or midline shift.   Nonspecific scattered white matter hypodensities favored to represent sequela of small vessel ischemia.     MACRO: None.   Signed by: Evan Finkelstein 1/18/2024 1:21 AM Dictation workstation:   [id number]    Lower extremity venous duplex bilateral    Result Date: 1/18/2024  Interpreted By:  Jerry Prieto, STUDY: [facility] LOWER EXTREMITY VENOUS DUPLEX BILATERAL;  1/18/2024 12:11 am   INDICATION: Signs/Symptoms:Concerns for PE and DVTs.   COMPARISON: None.   ACCESSION NUMBER(S): [id number]   ORDERING CLINICIAN: PING POTTS   TECHNIQUE: Grayscale, color and spectral Doppler sonographic images of the bilateral lower extremity deep venous system.   FINDINGS: RIGHT: There is normal compressibility of the common femoral vein, saphenous femoral junction, profunda femoral vein and popliteal vein. The posterior tibial and peroneal veins  demonstrate normal color flow and compressibility. There is normal spontaneous and phasic variation throughout the leg by spectral doppler.   LEFT: There is normal compressibility of the common femoral vein, saphenous femoral junction, profunda femoral vein and popliteal vein. The posterior tibial and peroneal veins  are suboptimally visualized. There is normal spontaneous and phasic variation throughout the leg by spectral doppler.   OTHER FINDINGS: None.       Suboptimal visualization of the left calf veins. No sonographic  evidence DVT in the visualized vessels of bilateral lower extremities.   MACRO: None   Signed by: Jerry Prieto 1/18/2024 12:32 AM Dictation workstation:   [id number]    XR chest 1 view    Result Date: 1/17/2024  Interpreted By:  Ronni Noel, STUDY: XR CHEST 1 VIEW;  1/17/2024 3:21 pm   INDICATION: Signs/Symptoms:fatigue/SOB.   COMPARISON: None.   ACCESSION NUMBER(S): [id number]   ORDERING CLINICIAN: SERAFIN RAO   FINDINGS: CARDIOMEDIASTINAL SILHOUETTE AND VASCULATURE:   Cardiac size:  Mild cardiomegaly   Aortic shadow:  Within normal limits.   Mediastinal contours: Within normal limits.   Pulmonary vasculature:  Mild cephalization of the pulmonary blood flow suggesting mild congestion.   LUNGS: There is mild interstitial prominence probably accentuated from a somewhat shallow inspiration, but may be due to mild or early congestion. Slight opacity left lower lung is probably from atelectasis and/or fibrosis, or possibly focal infiltrate. There is blunting at the costophrenic angle that may be from a small effusion. Follow-up as clinically warranted.   ABDOMEN AND OTHER FINDINGS: No remarkable upper abdominal findings.   BONES: No acute osseous changes.       1.  Fibrosis and/or mild or early congestion and left lower lung atelectasis or infiltrate.   Signed by: Ronni Noel 1/17/2024 3:33 PM Dictation workstation:   [id number]    Results for orders placed or performed during the hospital encounter of 01/17/24 (from the past 24 hour(s))   POCT GLUCOSE   Result Value Ref Range    POCT Glucose 84 74 - 99 mg/dL   Protein, Urine Random   Result Value Ref Range    Total Protein, Urine Random 163 (H) 5 - 25 mg/dL   POCT GLUCOSE   Result Value Ref Range    POCT Glucose 145 (H) 74 - 99 mg/dL   CBC   Result Value Ref Range    WBC 10.0 4.4 - 11.3 x10*3/uL    nRBC 0.8 (H) 0.0 - 0.0 /100 WBCs    RBC 3.43 (L) 4.00 - 5.20 x10*6/uL    Hemoglobin 9.3 (L) 12.0 - 16.0 g/dL    Hematocrit 31.8 (L) 36.0 - 46.0 %    MCV 93 80 - 100  fL    MCH 27.1 26.0 - 34.0 pg    MCHC 29.2 (L) 32.0 - 36.0 g/dL    RDW 15.8 (H) 11.5 - 14.5 %    Platelets 230 150 - 450 x10*3/uL   Renal Function Panel   Result Value Ref Range    Glucose 72 (L) 74 - 99 mg/dL    Sodium 141 136 - 145 mmol/L    Potassium 3.9 3.5 - 5.3 mmol/L    Chloride 101 98 - 107 mmol/L    Bicarbonate 34 (H) 21 - 32 mmol/L    Anion Gap 10 10 - 20 mmol/L    Urea Nitrogen 35 (H) 6 - 23 mg/dL    Creatinine 1.09 (H) 0.50 - 1.05 mg/dL    eGFR 50 (L) >60 mL/min/1.73m*2    Calcium 8.0 (L) 8.6 - 10.3 mg/dL    Phosphorus 3.4 2.5 - 4.9 mg/dL    Albumin 3.3 (L) 3.4 - 5.0 g/dL   POCT GLUCOSE   Result Value Ref Range    POCT Glucose 90 74 - 99 mg/dL   Albumin , Urine Random   Result Value Ref Range    Albumin, Urine Random 451.1 Not established mg/L    Creatinine, Urine Random 123.3 20.0 - 320.0 mg/dL    Albumin/Creatine Ratio 365.9 (H) <30.0 ug/mg Creat   POCT GLUCOSE   Result Value Ref Range    POCT Glucose 190 (H) 74 - 99 mg/dL    Scheduled medications  [Held by provider] amLODIPine, 2.5 mg, oral, Daily  aspirin, 81 mg, oral, Daily  atorvastatin, 40 mg, oral, Nightly  budesonide, 0.5 mg, nebulization, BID  cefTRIAXone, 1 g, intravenous, q24h  clopidogrel, 75 mg, oral, Daily  furosemide, 20 mg, intravenous, BID  insulin lispro, 0-5 Units, subcutaneous, TID  ipratropium-albuteroL, 3 mL, nebulization, TID  levothyroxine, 75 mcg, oral, Daily  methylPREDNISolone sodium succinate (PF), 20 mg, intravenous, q12h  oxygen, , inhalation, Continuous - 02/gases  pantoprazole, 40 mg, oral, Daily before breakfast  polyethylene glycol, 17 g, oral, Daily      Continuous medications       PRN medications  PRN medications: acetaminophen **OR** acetaminophen, dextrose 10 % in water (D10W), dextrose, glucagon, ipratropium-albuteroL, oxygen        Assessment/Plan    Susannah Oshea is a 83-year-old female with past medical history of HTN, HLD, hypothyroid, CKD 3, type II DM, presented to hospital with 1 week history of  confusion and a mechanical fall.  She is being managed to medical service for evaluation management of acute hypoxic respiratory failure and other multiple medical issues.    Progress:  1/20: Wean oxygen down today to maintain 88 to 94% oxygen saturation.  Patient is on 5.5 L oxygen nasal cannula today.  Cardiology following.  Day 3 of antibiotics    # Acute hypoxic respiratory failure  # Left lower lobe pneumonia  # Left lower lobe atelectasis  # Acute exacerbation of COPD  # Scattered pulmonary nodules  - We will continue treatment of pneumonia with Rocephin day 3 and azithromycin day 3.    - Pulmonology is following.    - We will continue with IV Solu-Medrol 20 mg twice daily along with respiratory bronchopulmonary hygiene along with DuoNebs, Pulmicort as advised by pulm.     # NSTEMI  # Elevated troponin  # HFrEF  # Moderate aortic stenosis  - Patient did not complain of any central chest pain, however, troponins were elevated at 556 to 769 to 663.  Cardiology is following.  They advised to continue diuresis with IV 20 mg Lasix.  Continue with aspirin. Cardiology continues to follow.  Will continue with judicious diuresis.  -Heparin infusion stopped at 4:40 PM on 1/19/2024 after completion of 48 hours of treatment  - Echo showed 40 to 45% ejection fraction and moderate aortic stenosis  - Because patient has moderate aortic stenosis, it is extremely important to ensure patient does not become hypotensive.  She is preload dependent and if she is diuresed too much she can become hypotensive.  We will monitor blood pressures very closely.    # LAUREN, likely prerenal--resolved  # Hyperkalemia-resolved  - Nephrology following: Want to determine extent of diabetic nephropathy with appropriate tests including urine albumin, protein, creatinine.  Avoid nephrotoxic agents.  Will maintain strict MAGY's and will fluid restrict 1.5 L.  Continue to monitor BMP.    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  Ultrasound renal showed large bladder mass but no hydronephrosis.  Urology consult advised to plan for outpatient cystoscopy B/L RGP TURBT as outpatient.  No acute surgical invention planned for this admission.    # Transaminitis  - This is likely secondary to acute infective process and possibly heart failure.  LFTs are downtrending.  On high intensity statin.    # HTN  # Hypothyroid  # Type II DM  - We will hold amlodipine for now.  Will continue Synthroid.  Continue with lispro sliding scale to ensure blood sugars are well-controlled.    - DVT PPx: Heparin subcu    Enoc Horvath, DO  PGY-1 Internal Medicine

## 2024-01-20 NOTE — PROGRESS NOTES
Lupe Oshea is a 83 y.o. female on day 3 of admission presenting with AMS (altered mental status).    Subjective   F/U gross hematuria/bladder mass  In better spirits today  Very vague on when the blood started in her urine, she told me a couple weeks ago, she has told other providers 2 years ago  Cardiac cath yesterday    Objective     Physical Exam  No distress    Current Facility-Administered Medications:     acetaminophen (Tylenol) oral liquid 650 mg, 650 mg, oral, q4h PRN **OR** acetaminophen (Tylenol) tablet 650 mg, 650 mg, oral, q4h PRN, Ronni Muro MD    [Held by provider] amLODIPine (Norvasc) tablet 2.5 mg, 2.5 mg, oral, Daily, Ronni Muro MD    aspirin chewable tablet 81 mg, 81 mg, oral, Daily, Ronni Muro MD, 81 mg at 01/20/24 0857    atorvastatin (Lipitor) tablet 40 mg, 40 mg, oral, Nightly, Ronni Muro MD, 40 mg at 01/19/24 2201    budesonide (Pulmicort) 0.5 mg/2 mL nebulizer solution 0.5 mg, 0.5 mg, nebulization, BID, Ronni Muro MD, 0.5 mg at 01/20/24 0629    cefTRIAXone (Rocephin) IVPB 1 g, 1 g, intravenous, q24h, Ronni Muro MD, Stopped at 01/19/24 2231    [COMPLETED] clopidogrel (Plavix) tablet 300 mg, 300 mg, oral, Once, 300 mg at 01/19/24 1621 **AND** clopidogrel (Plavix) tablet 75 mg, 75 mg, oral, Daily, Ronni Muro MD, 75 mg at 01/20/24 0857    dextrose 10 % in water (D10W) infusion, 0.3 g/kg/hr, intravenous, Once PRN, Ronni Muro MD    dextrose 50 % injection 25 g, 25 g, intravenous, q15 min PRN, Ronni Muro MD    [Held by provider] furosemide (Lasix) injection 20 mg, 20 mg, intravenous, q12h, Ronni Muro MD, 20 mg at 01/19/24 0600    glucagon (Glucagen) injection 1 mg, 1 mg, intramuscular, q15 min PRN, Ronni Muro MD    insulin lispro (HumaLOG) injection 0-5 Units, 0-5 Units, subcutaneous, TID, Ronni Muro MD    ipratropium-albuteroL (Duo-Neb) 0.5-2.5 mg/3 mL nebulizer solution 3 mL, 3 mL,  "nebulization, q2h PRN, Ronni Muro MD    ipratropium-albuteroL (Duo-Neb) 0.5-2.5 mg/3 mL nebulizer solution 3 mL, 3 mL, nebulization, TID, Ronni Muro MD, 3 mL at 01/20/24 0629    levothyroxine (Synthroid, Levoxyl) tablet 75 mcg, 75 mcg, oral, Daily, Ronni Muro MD, 75 mcg at 01/20/24 0857    methylPREDNISolone sod succinate (PF) (SOLU-Medrol) 40 mg/mL injection 20 mg, 20 mg, intravenous, q12h, Ronni Muro MD, 20 mg at 01/20/24 0611    oxygen (O2) therapy, , inhalation, Continuous - 02/gases, Ronni Muro MD, 6 L/min at 01/19/24 2024    oxygen (O2) therapy, , inhalation, Continuous PRN - O2/gases, Adele German MD    pantoprazole (ProtoNix) EC tablet 40 mg, 40 mg, oral, Daily before breakfast, Ronni Muro MD, 40 mg at 01/20/24 0609    polyethylene glycol (Glycolax, Miralax) packet 17 g, 17 g, oral, Daily, Ronni Muro MD, 17 g at 01/20/24 0857      Last Recorded Vitals  Blood pressure 103/51, pulse 77, temperature 35.7 °C (96.3 °F), resp. rate 17, height 1.6 m (5' 2.99\"), weight 56.2 kg (123 lb 14.4 oz), SpO2 98 %.  Intake/Output last 3 Shifts:  I/O last 3 completed shifts:  In: 1882.4 (33.5 mL/kg) [I.V.:1332.4 (23.7 mL/kg); IV Piggyback:550]  Out: 2803 (49.9 mL/kg) [Urine:2800 (1.4 mL/kg/hr); Stool:1; Blood:2]  Weight: 56.2 kg     Relevant Results  Results for orders placed or performed during the hospital encounter of 01/17/24 (from the past 96 hour(s))   Blood Gas Venous Full Panel Unsolicited   Result Value Ref Range    POCT pH, Venous 7.37 7.33 - 7.43 pH    POCT pCO2, Venous 46 41 - 51 mm Hg    POCT pO2, Venous 56 (H) 35 - 45 mm Hg    POCT SO2, Venous 85 (H) 45 - 75 %    POCT Oxy Hemoglobin, Venous 80.9 (H) 45.0 - 75.0 %    POCT Hematocrit Calculated, Venous 32.0 (L) 36.0 - 46.0 %    POCT Sodium, Venous 133 (L) 136 - 145 mmol/L    POCT Potassium, Venous 5.4 (H) 3.5 - 5.3 mmol/L    POCT Chloride, Venous 100 98 - 107 mmol/L    POCT Ionized Calicum, Venous 1.09 " (L) 1.10 - 1.33 mmol/L    POCT Glucose, Venous 144 (H) 74 - 99 mg/dL    POCT Lactate, Venous 2.2 (H) 0.4 - 2.0 mmol/L    POCT Base Excess, Venous 0.9 -2.0 - 3.0 mmol/L    POCT HCO3 Calculated, Venous 26.6 (H) 22.0 - 26.0 mmol/L    POCT Hemoglobin, Venous 10.8 (L) 12.0 - 16.0 g/dL    POCT Anion Gap, Venous 12.0 10.0 - 25.0 mmol/L    Patient Temperature 37.0 degrees Celsius    FiO2 36 %    Test Comment VERBAL STAT ORDER RAN X2    CBC and Auto Differential   Result Value Ref Range    WBC 8.7 4.4 - 11.3 x10*3/uL    nRBC 0.2 (H) 0.0 - 0.0 /100 WBCs    RBC 3.78 (L) 4.00 - 5.20 x10*6/uL    Hemoglobin 10.4 (L) 12.0 - 16.0 g/dL    Hematocrit 34.4 (L) 36.0 - 46.0 %    MCV 91 80 - 100 fL    MCH 27.5 26.0 - 34.0 pg    MCHC 30.2 (L) 32.0 - 36.0 g/dL    RDW 15.8 (H) 11.5 - 14.5 %    Platelets 296 150 - 450 x10*3/uL    Neutrophils % 76.3 40.0 - 80.0 %    Immature Granulocytes %, Automated 0.5 0.0 - 0.9 %    Lymphocytes % 11.0 13.0 - 44.0 %    Monocytes % 12.0 2.0 - 10.0 %    Eosinophils % 0.0 0.0 - 6.0 %    Basophils % 0.2 0.0 - 2.0 %    Neutrophils Absolute 6.61 (H) 1.60 - 5.50 x10*3/uL    Immature Granulocytes Absolute, Automated 0.04 0.00 - 0.50 x10*3/uL    Lymphocytes Absolute 0.95 0.80 - 3.00 x10*3/uL    Monocytes Absolute 1.04 (H) 0.05 - 0.80 x10*3/uL    Eosinophils Absolute 0.00 0.00 - 0.40 x10*3/uL    Basophils Absolute 0.02 0.00 - 0.10 x10*3/uL   Comprehensive metabolic panel   Result Value Ref Range    Glucose 133 (H) 74 - 99 mg/dL    Sodium 137 136 - 145 mmol/L    Potassium 5.4 (H) 3.5 - 5.3 mmol/L    Chloride 100 98 - 107 mmol/L    Bicarbonate 25 21 - 32 mmol/L    Anion Gap 17 10 - 20 mmol/L    Urea Nitrogen 51 (H) 6 - 23 mg/dL    Creatinine 2.12 (H) 0.50 - 1.05 mg/dL    eGFR 23 (L) >60 mL/min/1.73m*2    Calcium 8.1 (L) 8.6 - 10.3 mg/dL    Albumin 3.7 3.4 - 5.0 g/dL    Alkaline Phosphatase 79 33 - 136 U/L    Total Protein 5.9 (L) 6.4 - 8.2 g/dL     (H) 9 - 39 U/L    Bilirubin, Total 0.5 0.0 - 1.2 mg/dL    ALT  135 (H) 7 - 45 U/L   Magnesium   Result Value Ref Range    Magnesium 2.26 1.60 - 2.40 mg/dL   Lactate   Result Value Ref Range    Lactate 1.6 0.4 - 2.0 mmol/L   Troponin I, High Sensitivity   Result Value Ref Range    Troponin I, High Sensitivity 556 (HH) 0 - 13 ng/L   B-Type Natriuretic Peptide   Result Value Ref Range    BNP 2,731 (H) 0 - 99 pg/mL   D-dimer, VTE Exclusion   Result Value Ref Range    D-Dimer, Quantitative VTE Exclusion 1,748 (H) <=500 ng/mL FEU   Influenza A, and B PCR   Result Value Ref Range    Flu A Result Not Detected Not Detected    Flu B Result Not Detected Not Detected   SARS-CoV-2 RT PCR   Result Value Ref Range    Coronavirus 2019, PCR Not Detected Not Detected   RSV PCR   Result Value Ref Range    RSV PCR Not Detected Not Detected   Troponin I, High Sensitivity   Result Value Ref Range    Troponin I, High Sensitivity 769 (HH) 0 - 13 ng/L   Lactate   Result Value Ref Range    Lactate 1.4 0.4 - 2.0 mmol/L   Urinalysis with Reflex Culture and Microscopic   Result Value Ref Range    Color, Urine Camila (N) Straw, Yellow    Appearance, Urine Hazy (N) Clear    Specific Gravity, Urine 1.011 1.005 - 1.035    pH, Urine 5.0 5.0, 5.5, 6.0, 6.5, 7.0, 7.5, 8.0    Protein, Urine 100 (2+) (N) NEGATIVE mg/dL    Glucose, Urine NEGATIVE NEGATIVE mg/dL    Blood, Urine LARGE (3+) (A) NEGATIVE    Ketones, Urine NEGATIVE NEGATIVE mg/dL    Bilirubin, Urine NEGATIVE NEGATIVE    Urobilinogen, Urine <2.0 <2.0 mg/dL    Nitrite, Urine NEGATIVE NEGATIVE    Leukocyte Esterase, Urine NEGATIVE NEGATIVE   Extra Urine Gray Tube   Result Value Ref Range    Extra Tube Hold for add-ons.    Urinalysis Microscopic   Result Value Ref Range    WBC, Urine 1-5 1-5, NONE /HPF    RBC, Urine >20 (A) NONE, 1-2, 3-5 /HPF    Squamous Epithelial Cells, Urine 1-9 (SPARSE) Reference range not established. /HPF    Bacteria, Urine 1+ (A) NONE SEEN /HPF   Troponin I, High Sensitivity   Result Value Ref Range    Troponin I, High Sensitivity 663  (HH) 0 - 13 ng/L   Mycoplasma pneumoniae antibody, IgM   Result Value Ref Range    Mycoplasma pneumoniae IgM 0.03 <=0.76 U/L   Basic metabolic panel   Result Value Ref Range    Glucose 162 (H) 74 - 99 mg/dL    Sodium 139 136 - 145 mmol/L    Potassium 4.3 3.5 - 5.3 mmol/L    Chloride 99 98 - 107 mmol/L    Bicarbonate 29 21 - 32 mmol/L    Anion Gap 15 10 - 20 mmol/L    Urea Nitrogen 51 (H) 6 - 23 mg/dL    Creatinine 1.85 (H) 0.50 - 1.05 mg/dL    eGFR 27 (L) >60 mL/min/1.73m*2    Calcium 7.8 (L) 8.6 - 10.3 mg/dL   Lipid panel   Result Value Ref Range    Cholesterol 123 0 - 199 mg/dL    HDL-Cholesterol 47.6 mg/dL    Cholesterol/HDL Ratio 2.6     LDL Calculated 67 <=99 mg/dL    VLDL 8 0 - 40 mg/dL    Triglycerides 41 0 - 149 mg/dL    Non HDL Cholesterol 75 0 - 149 mg/dL   Heparin Assay, UFH   Result Value Ref Range    Heparin Unfractionated 0.7 See Comment Below for Therapeutic Ranges IU/mL   Type and screen   Result Value Ref Range    ABO TYPE O     Rh TYPE POS     ANTIBODY SCREEN NEG    POCT GLUCOSE   Result Value Ref Range    POCT Glucose 158 (H) 74 - 99 mg/dL   Lavender Top   Result Value Ref Range    Extra Tube Hold for add-ons.    Streptococcus pneumoniae Antigen, Urine    Specimen: Urine   Result Value Ref Range    Streptococcus pneumoniae Ag, Urine Negative Negative   Legionella Antigen, Urine    Specimen: Urine   Result Value Ref Range    L. pneumophila Urine Ag Negative Negative   Magnesium   Result Value Ref Range    Magnesium 2.63 (H) 1.60 - 2.40 mg/dL   Hepatic function panel   Result Value Ref Range    Albumin 3.4 3.4 - 5.0 g/dL    Bilirubin, Total 0.3 0.0 - 1.2 mg/dL    Bilirubin, Direct 0.1 0.0 - 0.3 mg/dL    Alkaline Phosphatase 65 33 - 136 U/L     (H) 7 - 45 U/L    AST 69 (H) 9 - 39 U/L    Total Protein 5.5 (L) 6.4 - 8.2 g/dL   Phosphorus   Result Value Ref Range    Phosphorus 6.0 (H) 2.5 - 4.9 mg/dL   Iron and TIBC   Result Value Ref Range    Iron <10 (L) 35 - 150 ug/dL    UIBC 369 110 - 370  ug/dL    TIBC      % Saturation     Transferrin   Result Value Ref Range    Transferrin 298 200 - 360 mg/dL   Lactate dehydrogenase   Result Value Ref Range     84 - 246 U/L   Heparin Assay, UFH   Result Value Ref Range    Heparin Unfractionated 0.5 See Comment Below for Therapeutic Ranges IU/mL   Haptoglobin   Result Value Ref Range    Haptoglobin 196 37 - 246 mg/dL   CBC   Result Value Ref Range    WBC 8.3 4.4 - 11.3 x10*3/uL    nRBC 0.4 (H) 0.0 - 0.0 /100 WBCs    RBC 3.69 (L) 4.00 - 5.20 x10*6/uL    Hemoglobin 10.0 (L) 12.0 - 16.0 g/dL    Hematocrit 32.8 (L) 36.0 - 46.0 %    MCV 89 80 - 100 fL    MCH 27.1 26.0 - 34.0 pg    MCHC 30.5 (L) 32.0 - 36.0 g/dL    RDW 15.5 (H) 11.5 - 14.5 %    Platelets 243 150 - 450 x10*3/uL   Ferritin   Result Value Ref Range    Ferritin 11 8 - 150 ng/mL   SST TOP   Result Value Ref Range    Extra Tube Hold for add-ons.    Lavender Top   Result Value Ref Range    Extra Tube Hold for add-ons.    Lavender Top   Result Value Ref Range    Extra Tube Hold for add-ons.    CBC   Result Value Ref Range    WBC 9.1 4.4 - 11.3 x10*3/uL    nRBC 0.2 (H) 0.0 - 0.0 /100 WBCs    RBC 3.53 (L) 4.00 - 5.20 x10*6/uL    Hemoglobin 9.6 (L) 12.0 - 16.0 g/dL    Hematocrit 32.0 (L) 36.0 - 46.0 %    MCV 91 80 - 100 fL    MCH 27.2 26.0 - 34.0 pg    MCHC 30.0 (L) 32.0 - 36.0 g/dL    RDW 15.6 (H) 11.5 - 14.5 %    Platelets 270 150 - 450 x10*3/uL   SST TOP   Result Value Ref Range    Extra Tube Hold for add-ons.    SST TOP   Result Value Ref Range    Extra Tube Hold for add-ons.    Lavender Top   Result Value Ref Range    Extra Tube Hold for add-ons.    C3 complement   Result Value Ref Range    C3 Complement 86 (L) 87 - 200 mg/dL   C4 complement   Result Value Ref Range    C4 Complement 40 10 - 50 mg/dL   Protein, Total   Result Value Ref Range    Total Protein 5.5 (L) 6.4 - 8.2 g/dL   Lavender Top   Result Value Ref Range    Extra Tube Hold for add-ons.    SST TOP   Result Value Ref Range    Extra Tube Hold  for add-ons.    Transthoracic Echo (TTE) Complete   Result Value Ref Range    AV pk lew 1.56     LVOT diam 1.80     LV biplane EF 59     LA vol index A/L 27.5     Tricuspid annular plane systolic excursion 2.4     RV free wall pk S' 11.10     LVIDd 4.54     RVSP 50.5     Aortic Valve Area by Continuity of Peak Velocity 1.04     AV pk grad 9.7     LV A4C EF 61.2    Procalcitonin   Result Value Ref Range    Procalcitonin 0.67 (H) <=0.07 ng/mL   Procalcitonin   Result Value Ref Range    Procalcitonin 0.23 (H) <=0.07 ng/mL   POCT GLUCOSE   Result Value Ref Range    POCT Glucose 132 (H) 74 - 99 mg/dL   POCT GLUCOSE   Result Value Ref Range    POCT Glucose 151 (H) 74 - 99 mg/dL   Albumin, Urine Random   Result Value Ref Range    Albumin, Urine Random 474.2 Not established mg/L    Creatinine, Urine Random 46.5 20.0 - 320.0 mg/dL    Albumin/Creatine Ratio 1,019.8 (H) <30.0 ug/mg Creat   Protein, Urine Random   Result Value Ref Range    Total Protein, Urine Random 350 (H) 5 - 24 mg/dL    Creatinine, Urine Random 46.5 20.0 - 320.0 mg/dL    T. Protein/Creatinine Ratio 7.53 (H) 0.00 - 0.17 mg/mg Creat   Staphylococcus aureus/MRSA colonization, Culture    Specimen: Anterior Nares; Swab   Result Value Ref Range    Staph/MRSA Screen Culture Culture in progress    Heparin Assay, UFH   Result Value Ref Range    Heparin Unfractionated 0.6 See Comment Below for Therapeutic Ranges IU/mL   Comprehensive metabolic panel   Result Value Ref Range    Glucose 130 (H) 74 - 99 mg/dL    Sodium 144 136 - 145 mmol/L    Potassium 4.6 3.5 - 5.3 mmol/L    Chloride 102 98 - 107 mmol/L    Bicarbonate 33 (H) 21 - 32 mmol/L    Anion Gap 14 10 - 20 mmol/L    Urea Nitrogen 48 (H) 6 - 23 mg/dL    Creatinine 1.37 (H) 0.50 - 1.05 mg/dL    eGFR 38 (L) >60 mL/min/1.73m*2    Calcium 8.7 8.6 - 10.3 mg/dL    Albumin 3.7 3.4 - 5.0 g/dL    Alkaline Phosphatase 72 33 - 136 U/L    Total Protein 6.0 (L) 6.4 - 8.2 g/dL    AST 39 9 - 39 U/L    Bilirubin, Total 0.3 0.0 -  1.2 mg/dL     (H) 7 - 45 U/L   CBC   Result Value Ref Range    WBC 9.8 4.4 - 11.3 x10*3/uL    nRBC 0.7 (H) 0.0 - 0.0 /100 WBCs    RBC 3.89 (L) 4.00 - 5.20 x10*6/uL    Hemoglobin 10.6 (L) 12.0 - 16.0 g/dL    Hematocrit 35.8 (L) 36.0 - 46.0 %    MCV 92 80 - 100 fL    MCH 27.2 26.0 - 34.0 pg    MCHC 29.6 (L) 32.0 - 36.0 g/dL    RDW 15.7 (H) 11.5 - 14.5 %    Platelets 253 150 - 450 x10*3/uL   Renal function panel   Result Value Ref Range    Glucose 130 (H) 74 - 99 mg/dL    Sodium 144 136 - 145 mmol/L    Potassium 4.6 3.5 - 5.3 mmol/L    Chloride 102 98 - 107 mmol/L    Bicarbonate 33 (H) 21 - 32 mmol/L    Anion Gap 14 10 - 20 mmol/L    Urea Nitrogen 48 (H) 6 - 23 mg/dL    Creatinine 1.37 (H) 0.50 - 1.05 mg/dL    eGFR 38 (L) >60 mL/min/1.73m*2    Calcium 8.7 8.6 - 10.3 mg/dL    Phosphorus 5.1 (H) 2.5 - 4.9 mg/dL    Albumin 3.7 3.4 - 5.0 g/dL   POCT GLUCOSE   Result Value Ref Range    POCT Glucose 126 (H) 74 - 99 mg/dL   POCT GLUCOSE   Result Value Ref Range    POCT Glucose 84 74 - 99 mg/dL   Protein, Urine Random   Result Value Ref Range    Total Protein, Urine Random 163 (H) 5 - 25 mg/dL   POCT GLUCOSE   Result Value Ref Range    POCT Glucose 145 (H) 74 - 99 mg/dL   CBC   Result Value Ref Range    WBC 10.0 4.4 - 11.3 x10*3/uL    nRBC 0.8 (H) 0.0 - 0.0 /100 WBCs    RBC 3.43 (L) 4.00 - 5.20 x10*6/uL    Hemoglobin 9.3 (L) 12.0 - 16.0 g/dL    Hematocrit 31.8 (L) 36.0 - 46.0 %    MCV 93 80 - 100 fL    MCH 27.1 26.0 - 34.0 pg    MCHC 29.2 (L) 32.0 - 36.0 g/dL    RDW 15.8 (H) 11.5 - 14.5 %    Platelets 230 150 - 450 x10*3/uL   Renal Function Panel   Result Value Ref Range    Glucose 72 (L) 74 - 99 mg/dL    Sodium 141 136 - 145 mmol/L    Potassium 3.9 3.5 - 5.3 mmol/L    Chloride 101 98 - 107 mmol/L    Bicarbonate 34 (H) 21 - 32 mmol/L    Anion Gap 10 10 - 20 mmol/L    Urea Nitrogen 35 (H) 6 - 23 mg/dL    Creatinine 1.09 (H) 0.50 - 1.05 mg/dL    eGFR 50 (L) >60 mL/min/1.73m*2    Calcium 8.0 (L) 8.6 - 10.3 mg/dL     Phosphorus 3.4 2.5 - 4.9 mg/dL    Albumin 3.3 (L) 3.4 - 5.0 g/dL   POCT GLUCOSE   Result Value Ref Range    POCT Glucose 90 74 - 99 mg/dL       Cardiac catheterization - coronary    Result Date: 1/19/2024   [facility], Cath Lab, [address] [place] [id number] Cardiovascular Catheterization Report Patient Name:      ALPHONSO ESPINO Performing Physician:  Debra Pablo MD Study Date:        1/19/2024            Verifying Physician:   Debra Pablo MD MRN/PID:           [id number]             Cardiologist/Co-scrub: Accession#:        [id number]         Ordering Provider:     Debra PABLO Date of Birth/Age: 1940 / 83 years  Fellow: Gender:            F                    Fellow: Encounter#:        [id number]  Study: Left Heart Cath  Indications: ALPHONSO ESPINO is a 84 year old female who presents with coronary artery disease, dyslipidemia, hypertension, chronic pulmonary disease and an anginal equivalent chest pain assessment (i.e. dyspnea on exertion believed to be from ischemia). NSTE - ACS.  Appropriate Use Criteria: Non ST elevation myocardial infarction with intermediate risk score; AUC score = 8.  Procedure Description: After infiltration with 2% Lidocaine, the right radial artery was cannulated with a modified Seldinger technique. Subsequently a 6 [place] sheath was placed retrograde in the right radial artery. Selective coronary catheterization was performed using a 5 Fr catheter(s) exchanged over a guide wire to cannulate the coronary arteries. Additional catheter(s) used to visualize the coronary arteries were: Tiger 4.0. Multiple injections of contrast were made into the left and right coronary arteries with angiograms recorded in multiple projections. After  completion of the procedure, the arterial sheath was pulled and a TR Band Radial Compression Device was utilized to obtain patent hemostasis.  Coronary Angiography: The coronary circulation is right dominant.  Coronary Angiography Comments: Left main coronary artery contains mild luminal irregularities. Left anterior descending artery contains a 70% stenosis proximally. There is a total occlusion in its midportion after the takeoff of the septal artery. There are left to left collaterals supplying the first diagonal artery. The right to left collaterals supplying the second diagonal artery. Left circumflex artery contains a 30% stenosis ostially. The obtuse marginal artery contains mild luminal irregularities. The ramus intermedius is a diminutive vessel with mild luminal irregularities. The right coronary artery contains a 30% stenosis in its midportion. Posterior descending artery contains an 80% stenosis in the mid PDA. Posterolateral branch contains mild luminal irregularities.  Left Ventriculography: EDP 16.  Hemo Personnel: +--------------------+---------+ Name                Duty      +--------------------+---------+ Ronni Muro MD 1 +--------------------+---------+  Hemodynamic Pressures:  +----+-------------------+---------+------------+-------------+------+---------+ Site     Date Time       Phase    Systolic    Diastolic    ED  Mean mmHg                          Name       mmHg        mmHg      mmHg           +----+-------------------+---------+------------+-------------+------+---------+   AO  1/19/2024 1:14:10  O2 REST         109           45             70                      PM                                                  +----+-------------------+---------+------------+-------------+------+---------+   LV  1/19/2024 1:23:43  O2 REST          94           10    16                               PM                                                   +----+-------------------+---------+------------+-------------+------+---------+   LV  1/19/2024 1:23:52  O2 REST          89            8    16                               PM                                                  +----+-------------------+---------+------------+-------------+------+---------+  LVp  1/19/2024 1:23:57  O2 REST          99           10    19                               PM                                                  +----+-------------------+---------+------------+-------------+------+---------+  AOp  1/19/2024 1:24:02  O2 REST          92           40             61                      PM                                                  +----+-------------------+---------+------------+-------------+------+---------+  Complications: No in-lab complications observed.  Cardiac Cath Post Procedure Notes: Post Procedure Diagnosis: CAD. Blood Loss:               Estimated blood loss during the procedure was < 10                           mls. Specimens Removed:        Number of specimen(s) removed: none.  Recommendations: Maximize medical therapy. Lipid lowering agent or Statin therapy. Further recommendations per Dr. Krueger. Aspirin therapy. ____________________________________________________________________________________ CONCLUSIONS:  1. Total occlusion of the mid LAD with collateralization to the first and second diagonal arteries.  2. Mild circumflex and RCA disease.  3. Severe branch vessel disease of the PDA.  4. Mildly elevated left heart filling pressures. ICD 10 Codes: Non ST elevation (NSTEMI) myocardial infarction-I21.4  CPT Codes: Left Heart Cath (visualization of coronaries) and LV-65063; Moderate Sedation Services 1st additional 15 minutes patient >5 years-[id number]  [id number] Ronni Muro MD Performing Physician Electronically signed by [id number] Ronni Muro MD on 1/19/2024 at 1:40:40 PM  ** Final **     US  renal complete    Result Date: 1/19/2024  Interpreted By:  Ronni Noel, STUDY: US RENAL COMPLETE;  1/18/2024 6:42 pm   INDICATION: Signs/Symptoms:looking for evidence of chronicity of kidney disease.   COMPARISON: None.   ACCESSION NUMBER(S): [id number]   ORDERING CLINICIAN: CEDRICK ESPINAL   TECHNIQUE: Multiple images of the kidneys were obtained  , with color Doppler for blood flow.  It is noted the exam was limited due to patient habitus.   FINDINGS: RIGHT KIDNEY: The right kidney measures 7.1 cm in length.  There is mild cortical thinning and atrophy. A 2.5 cm cortical cyst is present at the midpole. A 2 cm cyst is present at the upper pole. There is a 2.7 cm parapelvic cyst inferiorly.. A 9 mm cortical echogenicity is most likely an angiomyolipoma. However, an echogenic renal cell carcinoma is not completely excluded. Therefore, correlation with CT or MRI would be recommended for more definitive demonstration of fat and confirmation of angiomyolipoma. No hydronephrosis or evidence of nephrolithiasis. Color Doppler demonstrates renal blood flow.   LEFT KIDNEY: The left kidney measures 8.6 cm in length.  There is also mild cortical thinning and atrophy..   No hydronephrosis or evidence of nephrolithiasis.. Color Doppler demonstrates renal blood flow.   BLADDER: A 3.4 x 2.6 x 7.3 cm lesion is at the floor of the bladder. Centrally this is predominantly echo lucent , but blood flow is demonstrated at the periphery. This would be most suspicious for a necrotic malignant mass. Urological consultation recommended.   OTHER FINDINGS: Moderate left pleural effusion.         1. Bladder mass as described. 2. 9 mm cortical echogenicity at the right kidney, probably an angiomyolipoma. However, CT or MRI correlation would be recommended. 3. Left pleural effusion.   Signed by: Ronni Noel 1/19/2024 10:14 AM Dictation workstation:   [id number]    Transthoracic Echo (TTE) Complete    Result Date: 1/18/2024    [facility]  [place], [address] [facility] [id number] [phone] and                                 Fax [phone] TRANSTHORACIC ECHOCARDIOGRAM REPORT  Patient Name:      ALPHONSO ESPINO Rigo Physician:    [id number] Faisal Krueger MD Study Date:        1/18/2024            Ordering Provider:    [id number] DALI JEFFERY MRN/PID:           [id number]             Fellow: Accession#:        [id number]         Nurse: Date of Birth/Age: 1940 / 83 years  Sonographer:          Louise Peres RDCS Gender:            F                    Additional Staff: Height:            160.02 cm            Admit Date:           1/17/2024 Weight:            56.25 kg             Admission Status:     Observation -                                                               Priority discharge BSA:               1.58 m2              Encounter#:           [id number]                                         Department Location:  [facility] Blood Pressure: 101 /50 mmHg Study Type:    TRANSTHORACIC ECHO (TTE) COMPLETE Diagnosis/ICD: Non ST elevation (NSTEMI) myocardial infarction-I21.4 Indication:    Hypoxic CPT Code:      Echo Complete w Full [id number] Patient History: Smoker: Current. Study Detail: The following Echo studies were performed: 2D, M-Mode, Doppler and               color flow.  PHYSICIAN INTERPRETATION: Left Ventricle: The left ventricular systolic function is mildly decreased, with an estimated ejection fraction of 40-45%. Wall motion is abnormal. The left ventricular cavity size is normal. Spectral Doppler shows a pseudonormal pattern of left ventricular diastolic filling. LV Wall Scoring: The entire anterior septum and apex are hypokinetic. Left Atrium: The left atrium is normal in size. Right Ventricle: The right ventricle is  upper limits of normal in size. There is normal right ventricular global systolic function. Right Atrium: The right atrium is moderately dilated. Aortic Valve: The aortic valve is trileaflet. There is mild to moderate aortic valve cusp calcification. There is mild aortic valve thickening. There is evidence of mildly elevated transaortic gradients consistent with sclerosis of the aortic valve. There is no evidence of aortic valve regurgitation. The peak instantaneous gradient of the aortic valve is 9.7 mmHg. Mitral Valve: The mitral valve is mildly thickened. There is mild to moderate mitral valve regurgitation. Tricuspid Valve: The tricuspid valve is structurally normal. There is trace tricuspid regurgitation. The Doppler estimated RVSP is moderately elevated at 50.5 mmHg. Pulmonic Valve: The pulmonic valve is structurally normal. There is mild pulmonic valve regurgitation. Pericardium: There is a trivial pericardial effusion. Pleural: There is a small bilateral pleural effusion. Aorta: The aortic root is normal. Systemic Veins: The inferior vena cava appears dilated. There is less than 50% IVC collapse with inspiration. In comparison to the previous echocardiogram(s): There are no prior studies on this patient for comparison purposes.  CONCLUSIONS:  1. Left ventricular systolic function is mildly decreased with a 40-45% estimated ejection fraction.  2. Entire anterior septum and apex are abnormal.  3. Spectral Doppler shows a pseudonormal pattern of left ventricular diastolic filling.  4. The right atrium is moderately dilated.  5. Mild to moderate mitral valve regurgitation.  6. Moderately elevated right ventricular systolic pressure.  7. Aortic valve sclerosis. QUANTITATIVE DATA SUMMARY: 2D MEASUREMENTS:                          Normal Ranges: Ao Root d:     2.80 cm   (2.0-3.7cm) LAs:           4.00 cm   (2.7-4.0cm) IVSd:          0.96 cm   (0.6-1.1cm) LVPWd:         0.76 cm   (0.6-1.1cm) LVIDd:         4.54 cm    (3.9-5.9cm) LVIDs:         2.77 cm LV Mass Index: 80.6 g/m2 LV % FS        39.0 % LA VOLUME:                               Normal Ranges: LA Vol A4C:        40.8 ml    (22+/-6mL/m2) LA Vol A2C:        42.5 ml LA Vol BP:         43.4 ml LA Vol Index A4C:  25.9ml/m2 LA Vol Index A2C:  27.0 ml/m2 LA Vol Index BP:   27.5 ml/m2 LA Area A4C:       15.8 cm2 LA Area A2C:       15.5 cm2 LA Major Axis A4C: 5.2 cm LA Major Axis A2C: 4.8 cm LA Volume Index:   26.2 ml/m2 LA Vol A4C:        38.6 ml LA Vol A2C:        42.5 ml RA VOLUME BY A/L METHOD:                       Normal Ranges: RA Area A4C: 17.4 cm2 M-MODE MEASUREMENTS:                  Normal Ranges: Ao Root: 3.20 cm (2.0-3.7cm) LAs:     4.10 cm (2.7-4.0cm) AORTA MEASUREMENTS:                    Normal Ranges: Asc Ao, d: 2.90 cm (2.1-3.4cm) LV SYSTOLIC FUNCTION BY 2D PLANIMETRY (MOD):                     Normal Ranges: EF-A4C View: 61.2 % (>=55%) EF-A2C View: 58.1 % EF-Biplane:  59.4 % LV DIASTOLIC FUNCTION:                               Normal Ranges: MV Peak A:        0.91 m/s    (0.42-0.7 m/s) MV lateral e'     0.07 m/s MV medial e'      0.05 m/s MV A Dur:         137.00 msec PulmV Sys Ho:    76.90 cm/s PulmV Puckett Ho:   46.70 cm/s PulmV S/D Ho:    1.60 PulmV A Revs Ho: 25.60 cm/s PulmV A Revs Dur: 106.00 msec MITRAL VALVE:                 Normal Ranges: MV DT: 198 msec (150-240msec) AORTIC VALVE:                         Normal Ranges: AoV Vmax:      1.56 m/s (<=1.7m/s) AoV Peak P.7 mmHg (<20mmHg) LVOT Max Ho:  0.64 m/s (<=1.1m/s) LVOT VTI:      14.10 cm LVOT Diameter: 1.80 cm  (1.8-2.4cm) AoV Area,Vmax: 1.04 cm2 (2.5-4.5cm2)  RIGHT VENTRICLE: RV Basal 3.81 cm RV Mid   1.97 cm RV Major 7.7 cm TAPSE:   23.7 mm RV s'    0.11 m/s TRICUSPID VALVE/RVSP:                             Normal Ranges: Peak TR Velocity: 2.98 m/s RV Syst Pressure: 50.5 mmHg (< 30mmHg) IVC Diam:         2.00 cm Pulmonary Veins: PulmV A Revs Dur: 106.00 msec PulmV A Revs Ho: 25.60 cm/s  PulmV Puckett Ho:   46.70 cm/s PulmV S/D Ho:    1.60 PulmV Sys Ho:    76.90 cm/s  [id number] Faisal Krueger MD Electronically signed on 1/18/2024 at 12:16:27 PM  Wall Scoring  ** Final **     NM Lung Perfusion    Result Date: 1/18/2024  Interpreted By:  Cynthia Islas and Maltbie Grace STUDY: NM LUNG PERFUSION;  1/18/2024 8:54 am   INDICATION: Signs/Symptoms:SOB, hypoxia, elevated d-dimer.   COMPARISON: CT chest 01/18/2024   ACCESSION NUMBER(S): [id number]   ORDERING CLINICIAN: PING POTTS   TECHNIQUE: DIVISION OF NUCLEAR MEDICINE PERFUSION LUNG SCANS   Multiple perfusion images of the lungs were acquired after the intravenous administration of 4.1 mCi of Tc-99m macroaggregated albumin (MAA). In addition, SPECT of the chest was performed.   FINDINGS: Perfusion images of both lungs demonstrate mild heterogeneity throughout the lung fields bilaterally. No distinct wedge-shaped subsegmental or segmental perfusion defect seen on SPECT to suggest acute pulmonary embolism (low probability).       1. No distinct wedge-shaped subsegmental or segmental perfusion defect seen on SPECT to suggest acute pulmonary embolism (low probability).   The interpretation above is based on modified PIOPED II and PISAPED criteria.   I personally reviewed the images/study and I agree with the findings as stated by Nuclear Medicine fellow Sol Gil MD. This study was interpreted at [place], [place].   Signed by: Cynthia Islas 1/18/2024 10:17 AM Dictation workstation:   [id number]    CT chest wo IV contrast    Result Date: 1/18/2024  Interpreted By:  Finkelstein, Evan, STUDY: CT CHEST WO IV CONTRAST;  1/18/2024 12:59 am   INDICATION: Signs/Symptoms:New hypoxia, concern for pneumonia.   COMPARISON: Chest radiograph 01/17/2024   ACCESSION NUMBER(S): [id number]   ORDERING CLINICIAN: PING POTTS   TECHNIQUE: Axial CT images of the chest obtained  without IV contrast.  Sagittal and coronal  reconstructions were created..   FINDINGS: CHEST WALL AND LOWER NECK: Within normal limits. UPPER ABDOMEN: There are vascular calcifications involving the renal vessels bilaterally along with suspected nonobstructing stones. Simple appearing cysts in the right kidney. Right suprarenal hypodensity measuring approximately 1.7 cm favored to represent an adrenal adenoma.   VESSELS: Enlarged main pulmonary artery measures up to 3.3 cm. Atherosclerotic calcifications in the aorta and coronary arteries. HEART: Cardiomegaly. No pericardial effusion. MEDIASTINUM AND HERMAN: 1 cm subcarinal lymph node. Prominent lymph nodes scattered elsewhere throughout the mediastinum as well. LUNG, PLEURA, AND LARGE AIRWAYS: Moderate left and small right pleural effusions. Thickened interlobular septal markings. Patchy left basilar airspace opacity. Dependent atelectasis. Moderate emphysematous changes scattered throughout the lungs bilaterally. Ground-glass opacities most pronounced along the periphery of the lungs. 3 mm pleural-based nodule along the periphery of the right middle lobe, best seen image 162 of series 202. 1.1 cm nodule in the lingula image 135.   BONES: No acute osseous abnormality.       Patchy left basilar airspace opacity concerning for pneumonia. Additional dependent opacities are favored to represent superimposed atelectasis.   Ground-glass opacities most pronounced along the periphery of the lungs which may be infectious or inflammatory in etiology.   Moderate left and small right pleural effusions.   Cardiomegaly with thickened interlobular septal markings suggesting pulmonary interstitial edema.   Enlarged main pulmonary artery measures up to 3.3 cm and may be seen with pulmonary arterial hypertension.   Prominent nonspecific mediastinal lymph nodes, possibly reactive.   Scattered pulmonary nodules, largest measuring up to 1.1 cm in the lingula. Recommend follow-up imaging with CT at 3-6 months as per Carmela  criteria.   MACRO: None.   Signed by: Evan Finkelstein 1/18/2024 1:30 AM Dictation workstation:   [id number]    CT head wo IV contrast    Result Date: 1/18/2024  Interpreted By:  Finkelstein, Evan, STUDY: CT HEAD WO IV CONTRAST;  1/18/2024 12:59 am   INDICATION: Signs/Symptoms:Altered Mental Status.   COMPARISON: None.   ACCESSION NUMBER(S): [id number]   ORDERING CLINICIAN: PING POTTS   TECHNIQUE: Axial noncontrast CT images of the head with coronal and sagittal reconstructions.   FINDINGS: EXTRACRANIAL SOFT TISSUES: Unremarkable.   CALVARIUM: No depressed skull fracture. No destructive osseous lesion.   PARANASAL SINUSES/MASTOIDS: The visualized paranasal sinuses and mastoid air cells are aerated.   HEMORRHAGE: No acute intracranial hemorrhage.   BRAIN PARENCHYMA: Gray-white matter interfaces are preserved. No mass effect or midline shift. There are nonspecific scattered white matter hypodensities.   VENTRICLES and EXTRA-AXIAL SPACES: Parenchymal atrophy with prominence of the ventricles and cortical sulci.   OTHER FINDINGS: There are calcifications within the cavernous carotids       No acute intracranial hemorrhage, mass effect or midline shift.   Nonspecific scattered white matter hypodensities favored to represent sequela of small vessel ischemia.     MACRO: None.   Signed by: Evan Finkelstein 1/18/2024 1:21 AM Dictation workstation:   [id number]    Lower extremity venous duplex bilateral    Result Date: 1/18/2024  Interpreted By:  Jerry Prieto, STUDY: [facility] LOWER EXTREMITY VENOUS DUPLEX BILATERAL;  1/18/2024 12:11 am   INDICATION: Signs/Symptoms:Concerns for PE and DVTs.   COMPARISON: None.   ACCESSION NUMBER(S): [id number]   ORDERING CLINICIAN: PING POTTS   TECHNIQUE: Grayscale, color and spectral Doppler sonographic images of the bilateral lower extremity deep venous system.   FINDINGS: RIGHT: There is normal compressibility of the common femoral vein, saphenous femoral junction, profunda  femoral vein and popliteal vein. The posterior tibial and peroneal veins  demonstrate normal color flow and compressibility. There is normal spontaneous and phasic variation throughout the leg by spectral doppler.   LEFT: There is normal compressibility of the common femoral vein, saphenous femoral junction, profunda femoral vein and popliteal vein. The posterior tibial and peroneal veins  are suboptimally visualized. There is normal spontaneous and phasic variation throughout the leg by spectral doppler.   OTHER FINDINGS: None.       Suboptimal visualization of the left calf veins. No sonographic evidence DVT in the visualized vessels of bilateral lower extremities.   MACRO: None   Signed by: Jerry Prieto 1/18/2024 12:32 AM Dictation workstation:   [id number]    XR chest 1 view    Result Date: 1/17/2024  Interpreted By:  Ronni Noel, STUDY: XR CHEST 1 VIEW;  1/17/2024 3:21 pm   INDICATION: Signs/Symptoms:fatigue/SOB.   COMPARISON: None.   ACCESSION NUMBER(S): [id number]   ORDERING CLINICIAN: SERAFIN RAO   FINDINGS: CARDIOMEDIASTINAL SILHOUETTE AND VASCULATURE:   Cardiac size:  Mild cardiomegaly   Aortic shadow:  Within normal limits.   Mediastinal contours: Within normal limits.   Pulmonary vasculature:  Mild cephalization of the pulmonary blood flow suggesting mild congestion.   LUNGS: There is mild interstitial prominence probably accentuated from a somewhat shallow inspiration, but may be due to mild or early congestion. Slight opacity left lower lung is probably from atelectasis and/or fibrosis, or possibly focal infiltrate. There is blunting at the costophrenic angle that may be from a small effusion. Follow-up as clinically warranted.   ABDOMEN AND OTHER FINDINGS: No remarkable upper abdominal findings.   BONES: No acute osseous changes.       1.  Fibrosis and/or mild or early congestion and left lower lung atelectasis or infiltrate.   Signed by: Ronni Noel 1/17/2024 3:33 PM Dictation workstation:    [id number]       Assessment/Plan   Gross Hematuria/Bladder mass  -High suspicion for malignancy   -Stressed the importance of F/U with patient, she understood  -Will need cysto b/l RGP TURBT as an outpatient, unsure when she will get cardiac clearance as just had cardiac cath yesterday with totally occluded LAD with collateral vessels (being treated medically)    We will follow from a distance, patient shoed F/U in 2-3 weeks after discharge    Sukhjinder Gauthier PA-C

## 2024-01-20 NOTE — PROGRESS NOTES
Subjective Data:  Stable overnight.  She is status post left heart cath revealing  of the mid LAD.  No angina symptoms.  She is overall feeling better.  She does remain on 5 to 6 L of supplemental oxygen.  She is net -1.1 L over last 24 hours.     Objective Data:  Last Recorded Vitals:  Vitals:    01/19/24 1935 01/19/24 2100 01/20/24 0423 01/20/24 0629   BP: 104/53  103/51    BP Location: Left arm      Patient Position: Sitting      Pulse: 71  77    Resp: 18  17    Temp: 37.1 °C (98.8 °F)  35.7 °C (96.3 °F)    TempSrc: Temporal      SpO2: 93% 93% 97% 98%   Weight:       Height:           Last Labs:  CBC - 1/20/2024:  6:44 AM  10.0 9.3 230    31.8      CMP - 1/20/2024:  6:44 AM  8.0 6.0 39 --- 0.3   3.4 3.3 100 72      PTT - No results in last year.  _   _ _     TROPHS   Date/Time Value Ref Range Status   01/17/2024 10:03  0 - 13 ng/L Final     Comment:     Previous result verified on 1/17/2024 1553 on specimen/case [id number] called with component TRPHS for procedure Troponin I, High Sensitivity with value 556 ng/L.   01/17/2024 05:08  0 - 13 ng/L Final     Comment:     Previous result verified on 1/17/2024 1553 on specimen/case [id number] called with component TRPHS for procedure Troponin I, High Sensitivity with value 556 ng/L.   01/17/2024 03:09  0 - 13 ng/L Final     BNP   Date/Time Value Ref Range Status   01/17/2024 03:09 PM 2,731 0 - 99 pg/mL Final     HGBA1C   Date/Time Value Ref Range Status   09/13/2023 11:13 AM 6.0 % Final     Comment:          Diagnosis of Diabetes-Adults   Non-Diabetic: < or = 5.6%   Increased risk for developing diabetes: 5.7-6.4%   Diagnostic of diabetes: > or = 6.5%  .       Monitoring of Diabetes                Age (y)     Therapeutic Goal (%)   Adults:          >18           <7.0   Pediatrics:    13-18           <7.5                   7-12           <8.0                   0- 6            7.5-8.5   American Diabetes Association. Diabetes Care 33(S1),  Jan 2010.     10/13/2022 12:26 PM 5.4 % Final     Comment:          Diagnosis of Diabetes-Adults   Non-Diabetic: < or = 5.6%   Increased risk for developing diabetes: 5.7-6.4%   Diagnostic of diabetes: > or = 6.5%  .       Monitoring of Diabetes                Age (y)     Therapeutic Goal (%)   Adults:          >18           <7.0   Pediatrics:    13-18           <7.5                   7-12           <8.0                   0- 6            7.5-8.5   American Diabetes Association. Diabetes Care 33(S1), Jan 2010.     10/19/2020 04:43 PM 6.5 4.2 - 6.5 % Final   07/06/2020 11:00 AM 6.4 4.2 - 6.5 % Final     LDLCALC   Date/Time Value Ref Range Status   01/17/2024 10:03 PM 67 <=99 mg/dL Final     Comment:                                 Near   Borderline      AGE      Desirable  Optimal    High     High     Very High     0-19 Y     0 - 109     ---    110-129   >/= 130     ----    20-24 Y     0 - 119     ---    120-159   >/= 160     ----      >24 Y     0 -  99   100-129  130-159   160-189     >/=190       VLDL   Date/Time Value Ref Range Status   01/17/2024 10:03 PM 8 0 - 40 mg/dL Final   09/13/2023 11:13 AM 12 0 - 40 mg/dL Final   10/13/2022 12:26 PM 9 0 - 40 mg/dL Final   07/07/2022 11:24 AM 12 0 - 40 mg/dL Final      Last I/O:  I/O last 3 completed shifts:  In: 1882.4 (33.5 mL/kg) [I.V.:1332.4 (23.7 mL/kg); IV Piggyback:550]  Out: 2803 (49.9 mL/kg) [Urine:2800 (1.4 mL/kg/hr); Stool:1; Blood:2]  Weight: 56.2 kg     Past Cardiology Tests (Last 3 Years):  EKG:  No results found for this or any previous visit from the past 1095 days.    Echo:  Transthoracic Echo (TTE) Complete 01/18/2024    Ejection Fractions:  EF   Date/Time Value Ref Range Status   01/18/2024 10:15 AM 59       Cath:  Cardiac catheterization - coronary 01/19/2024    Stress Test:  No results found for this or any previous visit from the past 1095 days.    Cardiac Imaging:  No results found for this or any previous visit from the past 1095 days.      Inpatient  Medications:  Scheduled medications   Medication Dose Route Frequency    [Held by provider] amLODIPine  2.5 mg oral Daily    aspirin  81 mg oral Daily    atorvastatin  40 mg oral Nightly    budesonide  0.5 mg nebulization BID    cefTRIAXone  1 g intravenous q24h    clopidogrel  75 mg oral Daily    furosemide  20 mg intravenous BID    insulin lispro  0-5 Units subcutaneous TID    ipratropium-albuteroL  3 mL nebulization TID    levothyroxine  75 mcg oral Daily    methylPREDNISolone sodium succinate (PF)  20 mg intravenous q12h    oxygen   inhalation Continuous - 02/gases    pantoprazole  40 mg oral Daily before breakfast    polyethylene glycol  17 g oral Daily     PRN medications   Medication    acetaminophen    Or    acetaminophen    dextrose 10 % in water (D10W)    dextrose    glucagon    ipratropium-albuteroL    oxygen     Continuous Medications   Medication Dose Last Rate       Physical Exam:  General: No acute distress,  A&O x3.  Frail  Skin: Warm and dry  Neck: JVD is not elevated  ENT: Moist mucous membranes no lesions appreciated  Pulmonary: Mildly diminished bilaterally particular at the lung bases  Cards: Regular rate rhythm, no murmurs gallops or rubs appreciated normal S1-S2  Abdomen: Soft nontender nondistended  Extremities: No edema or cyanosis  Psych: Appropriate mood and affect     A/P    1.  NSTEMI: Initial symptoms of fatigue and weakness.  Echo showed new wall motion abnormality in the LAD territory with elevated troponins.  Left heart cath revealed  mid LAD moderate disease in the remaining coronary vessels.  Decision was made for medical management for now.  Will continue aspirin, Plavix and high intensity statin therapy.      2.  Heart failure reduced ejection fraction: EF of 40 to 45%: Presented with acute on chronic exacerbation, bilateral pleural effusions, transaminitis felt to be related to congestion and volume overload.  Marginal blood pressure so difficult to diurese but she is -1 L  over last 24 hours.  Will continue to diurese with IV Lasix.  Will attempt to add neurohormonal blockade if patient is able to tolerate.    Code Status:  Full Code    Faisal Krueger MD PhD

## 2024-01-20 NOTE — PROGRESS NOTES
Lupe Oshea is a 83 y.o. female on day 3 of admission presenting with AMS (altered mental status).      Subjective     Patient reports continued difficulties with shortness of breath.    Is requesting a cigarette.    Reports nonproductive cough.       Objective     Catheterization yesterday revealed chronic occlusion of the LAD.  Coronary disease of the PDA.    No interventions undertaken.       Vitals 24HR  Heart Rate:  [71-85]   Temp:  [35.7 °C (96.3 °F)-37.1 °C (98.8 °F)]   Resp:  [17-18]   BP: (103-114)/(51-55)   SpO2:  [91 %-98 %]         Intake/Output last 3 Shifts:    Intake/Output Summary (Last 24 hours) at 1/20/2024 0822  Last data filed at 1/20/2024 0608  Gross per 24 hour   Intake 1632.35 ml   Output 2803 ml   Net -1170.65 ml       Physical Exam    Patient is frail and cachectic appearing she is lying flat.  No jugular venous distention.  She does have bilateral carotid bruits.  Diffuse expiratory wheezes.  Generalized sarcopenia.  No cardiac rub.    Assessment/Plan      Patient with what appears to be mild underlying stage G3a chronic kidney disease.    Patient's creatinine is trending down towards baseline.  No evidence of contrast injury.    Patient had a Tp:Tc that revealed 7.5 g of proteinuria.  However albumin level is 3.7.  I suspect this is unreliable.    Will repeat microalbumin assessment.  If elevated will look to start low-dose ACE although this is going to be limited by hypotension.    Patient is anemic with no indication for ELIZABETH.        Principal Problem:    AMS (altered mental status)  Active Problems:    NSTEMI (non-ST elevated myocardial infarction) (CMS/[facility])      Jorge L Guzman MD

## 2024-01-20 NOTE — PROGRESS NOTES
Speech-Language Pathology    Inpatient Speech-Language Pathology Clinical Swallow Evaluation    Patient Name: Lupe Oshea  MRN: [id number]  Today's Date: 1/20/2024   Time Calculation  Start Time: 0850  Stop Time: 0908  Time Calculation (min): 18 min     Current Problem:   1. AMS (altered mental status)        2. NSTEMI (non-ST elevated myocardial infarction) (CMS/HCC)  Transthoracic Echo (TTE) Complete    Transthoracic Echo (TTE) Complete    Case Request Cath Lab: Left Heart Cath, With LV    Case Request Cath Lab: Left Heart Cath, With LV    Cardiac catheterization - coronary    Cardiac catheterization - coronary      3. Deep vein thrombosis (DVT) of distal vein of lower extremity, unspecified chronicity, unspecified laterality (CMS/HCC)  Lower extremity venous duplex bilateral    Lower extremity venous duplex bilateral      4. Leg swelling  Lower extremity venous duplex bilateral    Lower extremity venous duplex bilateral      5. Ventricular bigeminy        6. Acute hypoxic respiratory failure (CMS/HCC)        7. Chronic obstructive pulmonary disease with acute exacerbation (CMS/[facility])        8. Congestive heart failure, unspecified HF chronicity, unspecified heart failure type (CMS/[facility])          Recommendations:  Solid Diet Recommendations : Regular (IDDSI Level 7)  Liquid Diet Recommendations: Thin (IDDSI Level 0)  Compensatory Swallowing Strategies: Small bites/sips, Single sips, Alternate solids and liquids  Medication Administration Recommendations: Whole, With Liquid    Assessment:  Assessment Results: Patient appears to present with a grossly functional oropharyngeal swallowing mechanism given implementation of general swallow guidelines/controls. Mastication skills will further improve once upper partials brought to hospital later this date by pt's son.  Prognosis: Good  Medical Staff Made Aware: Yes  Strengths: Motivation, Family/Caregiver Suppport    Plan:  SLP Plan: No skilled SLP  SLP Discharge  Recommendations:  (no further skilled ST intervention clinically warranted)  Diet Recommendations: Solid, Liquid  Solid Consistency: Regular (IDDSI Level 7)  Liquid Consistency: Thin (IDDSI Level 0)  Discussed POC: Patient, Nursing  Discussed Risks/Benefits: Yes  Patient/Caregiver Agreeable: Yes  SLP - OK to Discharge: Yes    Subjective   Current Problem:  Clinical swallow evaluation ordered to rule out aspiration. NSG (Richard) and pt deny dysphagia.     General Visit Information:  Patient Class: Inpatient  Referred By: Dr. Muro  Past Medical History Relevant to Rehab: htn,hld,dm,ckd,  Patient Seen During This Visit: Yes  [employer] Diet: Regular/Thin Liquids  Current Diet : Regular/Thin Liquids  Dysphagia Diagnosis:  (Patient appears to present with a grossly functional oropharyngeal swallowing mechanism given implementation of swallow controls.)    Vital Signs: Afebrile. 98% SPO2 on 6 liters nasal cannula. WBC WNL (10.0)    CxR 1/17: IMPRESSION:  1.  Fibrosis and/or mild or early congestion and left lower lung atelectasis or infiltrate.    CT Chest 1/18: Patchy left basilar airspace opacity concerning for pneumonia. Additional dependent opacities are favored to represent superimposed atelectasis.      Ground-glass opacities most pronounced along the periphery of the lungs which may be infectious or inflammatory in etiology.      Moderate left and small right pleural effusions.      Cardiomegaly with thickened interlobular septal markings suggesting pulmonary interstitial edema.      Enlarged main pulmonary artery measures up to 3.3 cm and may be seen with pulmonary arterial hypertension.      Prominent nonspecific mediastinal lymph nodes, possibly reactive.      Scattered pulmonary nodules, largest measuring up to 1.1 cm in the lingula. Recommend follow-up imaging with CT at 3-6 months as per Fleischner criteria.    Objective     Baseline Assessment:  Respiratory Status: Oxygen via nasal cannula (6  liters)  Vision: Functional for self-feeding  Patient Positioning:  (seated on edge of bed)    Pain:  Pain Assessment: 0-10  Pain Score: 0 - No pain     Oral/Motor Assessment:  Oral Hygiene: WNL  Dentition:  (Missing some upper and lower dentition; pt's son bringing in pt's upper partial to hospital later this date.)  Oral Motor: Within Functional Limits    Consistencies Trialed:  Consistencies Trialed: Yes  Consistencies Trialed: Thin (IDDSI Level 0) - Straw, Regular (IDDSI Level 7) (Mixed consistency (whole pilsl with water))    Clinical Observations:  Patient Positioning:  (Seated on edge of bed)  Management of Oral Secretions: Adequate  Was The 3 oz Swallow Protocol Completed: Yes    Pt self-fed hard solids, large consecutive straw sips of thin liquid, and mixed consistency (whole pills with water). Mastication skills are functional given additional time (due to missing natural dentition), however pt's son to bring upper partial to hospital later this date; time of mastication length will improve once partial in place. Trace oral residuals fully clear with liquid wash. Swallow onset appears prompt with adequate hyo-laryngeal elevation palpated. No overt s/s of aspiration and vocal quality/respirations remained unchanged from baseline on all tested consistencies (even during 3oz of water protocol). Pt denies burning or globus sensation in pharynx post p.o. trials. Pt swallowed AM medications whole via water well.     Inpatient:  Education Comments  Results/recommendations (including diet level, general swallow controls/aspiration precautions, POC) discussed with pt and NSG Jaimee).

## 2024-01-21 LAB
25(OH)D3 SERPL-MCNC: 39 NG/ML (ref 30–100)
ALBUMIN SERPL BCP-MCNC: 3.4 G/DL (ref 3.4–5)
ALBUMIN SERPL BCP-MCNC: 3.6 G/DL (ref 3.4–5)
ANION GAP SERPL CALC-SCNC: 10 MMOL/L (ref 10–20)
ANION GAP SERPL CALC-SCNC: 9 MMOL/L (ref 10–20)
BUN SERPL-MCNC: 34 MG/DL (ref 6–23)
BUN SERPL-MCNC: 38 MG/DL (ref 6–23)
CALCIUM SERPL-MCNC: 8.3 MG/DL (ref 8.6–10.3)
CALCIUM SERPL-MCNC: 9 MG/DL (ref 8.6–10.3)
CHLORIDE SERPL-SCNC: 98 MMOL/L (ref 98–107)
CHLORIDE SERPL-SCNC: 99 MMOL/L (ref 98–107)
CO2 SERPL-SCNC: 37 MMOL/L (ref 21–32)
CO2 SERPL-SCNC: 38 MMOL/L (ref 21–32)
CREAT SERPL-MCNC: 1.21 MG/DL (ref 0.5–1.05)
CREAT SERPL-MCNC: 1.6 MG/DL (ref 0.5–1.05)
EGFRCR SERPLBLD CKD-EPI 2021: 32 ML/MIN/1.73M*2
EGFRCR SERPLBLD CKD-EPI 2021: 44 ML/MIN/1.73M*2
ERYTHROCYTE [DISTWIDTH] IN BLOOD BY AUTOMATED COUNT: 16 % (ref 11.5–14.5)
GLUCOSE BLD MANUAL STRIP-MCNC: 106 MG/DL (ref 74–99)
GLUCOSE BLD MANUAL STRIP-MCNC: 168 MG/DL (ref 74–99)
GLUCOSE BLD MANUAL STRIP-MCNC: 174 MG/DL (ref 74–99)
GLUCOSE BLD MANUAL STRIP-MCNC: 213 MG/DL (ref 74–99)
GLUCOSE SERPL-MCNC: 101 MG/DL (ref 74–99)
GLUCOSE SERPL-MCNC: 186 MG/DL (ref 74–99)
HCT VFR BLD AUTO: 32.6 % (ref 36–46)
HGB BLD-MCNC: 9.7 G/DL (ref 12–16)
MAGNESIUM SERPL-MCNC: 1.95 MG/DL (ref 1.6–2.4)
MCH RBC QN AUTO: 27.6 PG (ref 26–34)
MCHC RBC AUTO-ENTMCNC: 29.8 G/DL (ref 32–36)
MCV RBC AUTO: 93 FL (ref 80–100)
NRBC BLD-RTO: 0.8 /100 WBCS (ref 0–0)
PHOSPHATE SERPL-MCNC: 2.6 MG/DL (ref 2.5–4.9)
PHOSPHATE SERPL-MCNC: 3.4 MG/DL (ref 2.5–4.9)
PLATELET # BLD AUTO: 235 X10*3/UL (ref 150–450)
POTASSIUM SERPL-SCNC: 4 MMOL/L (ref 3.5–5.3)
POTASSIUM SERPL-SCNC: 4 MMOL/L (ref 3.5–5.3)
RBC # BLD AUTO: 3.52 X10*6/UL (ref 4–5.2)
SODIUM SERPL-SCNC: 141 MMOL/L (ref 136–145)
SODIUM SERPL-SCNC: 142 MMOL/L (ref 136–145)
STAPHYLOCOCCUS SPEC CULT: NORMAL
WBC # BLD AUTO: 9.8 X10*3/UL (ref 4.4–11.3)

## 2024-01-21 PROCEDURE — 82306 VITAMIN D 25 HYDROXY: CPT | Mod: PARLAB | Performed by: INTERNAL MEDICINE

## 2024-01-21 PROCEDURE — 80069 RENAL FUNCTION PANEL: CPT | Mod: MUE

## 2024-01-21 PROCEDURE — 2500000001 HC RX 250 WO HCPCS SELF ADMINISTERED DRUGS (ALT 637 FOR MEDICARE OP): Performed by: INTERNAL MEDICINE

## 2024-01-21 PROCEDURE — 36415 COLL VENOUS BLD VENIPUNCTURE: CPT | Performed by: INTERNAL MEDICINE

## 2024-01-21 PROCEDURE — 2500000004 HC RX 250 GENERAL PHARMACY W/ HCPCS (ALT 636 FOR OP/ED): Performed by: INTERNAL MEDICINE

## 2024-01-21 PROCEDURE — 2500000005 HC RX 250 GENERAL PHARMACY W/O HCPCS: Performed by: INTERNAL MEDICINE

## 2024-01-21 PROCEDURE — 94640 AIRWAY INHALATION TREATMENT: CPT | Mod: MUE

## 2024-01-21 PROCEDURE — 85027 COMPLETE CBC AUTOMATED: CPT

## 2024-01-21 PROCEDURE — 99233 SBSQ HOSP IP/OBS HIGH 50: CPT

## 2024-01-21 PROCEDURE — 1200000002 HC GENERAL ROOM WITH TELEMETRY DAILY

## 2024-01-21 PROCEDURE — 82947 ASSAY GLUCOSE BLOOD QUANT: CPT

## 2024-01-21 PROCEDURE — 2500000002 HC RX 250 W HCPCS SELF ADMINISTERED DRUGS (ALT 637 FOR MEDICARE OP, ALT 636 FOR OP/ED): Performed by: INTERNAL MEDICINE

## 2024-01-21 PROCEDURE — 80069 RENAL FUNCTION PANEL: CPT | Performed by: INTERNAL MEDICINE

## 2024-01-21 PROCEDURE — 83735 ASSAY OF MAGNESIUM: CPT

## 2024-01-21 PROCEDURE — 36415 COLL VENOUS BLD VENIPUNCTURE: CPT

## 2024-01-21 PROCEDURE — 2500000004 HC RX 250 GENERAL PHARMACY W/ HCPCS (ALT 636 FOR OP/ED): Performed by: STUDENT IN AN ORGANIZED HEALTH CARE EDUCATION/TRAINING PROGRAM

## 2024-01-21 PROCEDURE — 99232 SBSQ HOSP IP/OBS MODERATE 35: CPT | Performed by: STUDENT IN AN ORGANIZED HEALTH CARE EDUCATION/TRAINING PROGRAM

## 2024-01-21 RX ORDER — FUROSEMIDE 20 MG/1
20 TABLET ORAL DAILY
Status: DISCONTINUED | OUTPATIENT
Start: 2024-01-22 | End: 2024-01-23 | Stop reason: HOSPADM

## 2024-01-21 RX ORDER — LISINOPRIL 5 MG/1
2.5 TABLET ORAL DAILY
Status: DISCONTINUED | OUTPATIENT
Start: 2024-01-21 | End: 2024-01-23 | Stop reason: HOSPADM

## 2024-01-21 RX ADMIN — INSULIN LISPRO 1 UNITS: 100 INJECTION, SOLUTION INTRAVENOUS; SUBCUTANEOUS at 15:51

## 2024-01-21 RX ADMIN — PANTOPRAZOLE SODIUM 40 MG: 40 TABLET, DELAYED RELEASE ORAL at 06:06

## 2024-01-21 RX ADMIN — CLOPIDOGREL 75 MG: 75 TABLET ORAL at 09:40

## 2024-01-21 RX ADMIN — METHYLPREDNISOLONE SODIUM SUCCINATE 20 MG: 40 INJECTION, POWDER, FOR SOLUTION INTRAMUSCULAR; INTRAVENOUS at 06:06

## 2024-01-21 RX ADMIN — INSULIN LISPRO 1 UNITS: 100 INJECTION, SOLUTION INTRAVENOUS; SUBCUTANEOUS at 20:09

## 2024-01-21 RX ADMIN — IPRATROPIUM BROMIDE AND ALBUTEROL SULFATE 3 ML: .5; 3 SOLUTION RESPIRATORY (INHALATION) at 06:51

## 2024-01-21 RX ADMIN — METHYLPREDNISOLONE SODIUM SUCCINATE 20 MG: 40 INJECTION, POWDER, FOR SOLUTION INTRAMUSCULAR; INTRAVENOUS at 19:08

## 2024-01-21 RX ADMIN — CEFTRIAXONE SODIUM 1 G: 1 INJECTION, SOLUTION INTRAVENOUS at 21:50

## 2024-01-21 RX ADMIN — LEVOTHYROXINE SODIUM 75 MCG: 0.07 TABLET ORAL at 09:40

## 2024-01-21 RX ADMIN — IPRATROPIUM BROMIDE AND ALBUTEROL SULFATE 3 ML: .5; 3 SOLUTION RESPIRATORY (INHALATION) at 12:39

## 2024-01-21 RX ADMIN — Medication 2 L/MIN: at 07:00

## 2024-01-21 RX ADMIN — ASPIRIN 81 MG: 81 TABLET, CHEWABLE ORAL at 09:40

## 2024-01-21 RX ADMIN — BUDESONIDE INHALATION 0.5 MG: 0.5 SUSPENSION RESPIRATORY (INHALATION) at 19:10

## 2024-01-21 RX ADMIN — FUROSEMIDE 20 MG: 10 INJECTION, SOLUTION INTRAMUSCULAR; INTRAVENOUS at 09:40

## 2024-01-21 RX ADMIN — ATORVASTATIN CALCIUM 40 MG: 40 TABLET, FILM COATED ORAL at 20:08

## 2024-01-21 RX ADMIN — IPRATROPIUM BROMIDE AND ALBUTEROL SULFATE 3 ML: .5; 3 SOLUTION RESPIRATORY (INHALATION) at 19:10

## 2024-01-21 RX ADMIN — BUDESONIDE INHALATION 0.5 MG: 0.5 SUSPENSION RESPIRATORY (INHALATION) at 06:51

## 2024-01-21 ASSESSMENT — COGNITIVE AND FUNCTIONAL STATUS - GENERAL
DAILY ACTIVITIY SCORE: 24
MOBILITY SCORE: 23
CLIMB 3 TO 5 STEPS WITH RAILING: A LITTLE

## 2024-01-21 ASSESSMENT — PAIN - FUNCTIONAL ASSESSMENT: PAIN_FUNCTIONAL_ASSESSMENT: 0-10

## 2024-01-21 ASSESSMENT — PAIN SCALES - GENERAL
PAINLEVEL_OUTOF10: 0 - NO PAIN
PAINLEVEL_OUTOF10: 0 - NO PAIN

## 2024-01-21 NOTE — PROGRESS NOTES
Subjective Data:  Stable night.  Patient remains on supplemental oxygen.  No chest discomfort.     Objective Data:  Last Recorded Vitals:  Vitals:    01/20/24 1830 01/20/24 1945 01/21/24 0300 01/21/24 0652   BP:  117/56 111/54    Patient Position:       Pulse: 88 73 77    Resp:  20     Temp:  36.4 °C (97.5 °F) 36.1 °C (97 °F)    TempSrc:       SpO2: 93% 95% 97% 100%   Weight:       Height:           Last Labs:  CBC - 1/21/2024:  7:52 AM  9.8 9.7 235    32.6      CMP - 1/21/2024:  7:52 AM  8.3 6.0 39 --- 0.3   3.4 3.4 100 72      PTT - No results in last year.  _   _ _     TROPHS   Date/Time Value Ref Range Status   01/17/2024 10:03  0 - 13 ng/L Final     Comment:     Previous result verified on 1/17/2024 1553 on specimen/case [id number] called with component TRPHS for procedure Troponin I, High Sensitivity with value 556 ng/L.   01/17/2024 05:08  0 - 13 ng/L Final     Comment:     Previous result verified on 1/17/2024 1553 on specimen/case [id number] called with component TRPHS for procedure Troponin I, High Sensitivity with value 556 ng/L.   01/17/2024 03:09  0 - 13 ng/L Final     BNP   Date/Time Value Ref Range Status   01/17/2024 03:09 PM 2,731 0 - 99 pg/mL Final     HGBA1C   Date/Time Value Ref Range Status   09/13/2023 11:13 AM 6.0 % Final     Comment:          Diagnosis of Diabetes-Adults   Non-Diabetic: < or = 5.6%   Increased risk for developing diabetes: 5.7-6.4%   Diagnostic of diabetes: > or = 6.5%  .       Monitoring of Diabetes                Age (y)     Therapeutic Goal (%)   Adults:          >18           <7.0   Pediatrics:    13-18           <7.5                   7-12           <8.0                   0- 6            7.5-8.5   American Diabetes Association. Diabetes Care 33(S1), Jan 2010.     10/13/2022 12:26 PM 5.4 % Final     Comment:          Diagnosis of Diabetes-Adults   Non-Diabetic: < or = 5.6%   Increased risk for developing diabetes: 5.7-6.4%   Diagnostic of  diabetes: > or = 6.5%  .       Monitoring of Diabetes                Age (y)     Therapeutic Goal (%)   Adults:          >18           <7.0   Pediatrics:    13-18           <7.5                   7-12           <8.0                   0- 6            7.5-8.5   American Diabetes Association. Diabetes Care 33(S1), Jan 2010.     10/19/2020 04:43 PM 6.5 4.2 - 6.5 % Final   07/06/2020 11:00 AM 6.4 4.2 - 6.5 % Final     LDLCALC   Date/Time Value Ref Range Status   01/17/2024 10:03 PM 67 <=99 mg/dL Final     Comment:                                 Near   Borderline      AGE      Desirable  Optimal    High     High     Very High     0-19 Y     0 - 109     ---    110-129   >/= 130     ----    20-24 Y     0 - 119     ---    120-159   >/= 160     ----      >24 Y     0 -  99   100-129  130-159   160-189     >/=190       VLDL   Date/Time Value Ref Range Status   01/17/2024 10:03 PM 8 0 - 40 mg/dL Final   09/13/2023 11:13 AM 12 0 - 40 mg/dL Final   10/13/2022 12:26 PM 9 0 - 40 mg/dL Final   07/07/2022 11:24 AM 12 0 - 40 mg/dL Final      Last I/O:  I/O last 3 completed shifts:  In: 1553 (27.6 mL/kg) [P.O.:240; I.V.:713 (12.7 mL/kg); IV Piggyback:600]  Out: 401 (7.1 mL/kg) [Urine:400 (0.2 mL/kg/hr); Stool:1]  Weight: 56.2 kg     Past Cardiology Tests (Last 3 Years):  EKG:  No results found for this or any previous visit from the past 1095 days.    Echo:  Transthoracic Echo (TTE) Complete 01/18/2024    Ejection Fractions:  EF   Date/Time Value Ref Range Status   01/18/2024 10:15 AM 59       Cath:  Cardiac catheterization - coronary 01/19/2024    Stress Test:  No results found for this or any previous visit from the past 1095 days.    Cardiac Imaging:  No results found for this or any previous visit from the past 1095 days.      Inpatient Medications:  Scheduled medications   Medication Dose Route Frequency    [Held by provider] amLODIPine  2.5 mg oral Daily    aspirin  81 mg oral Daily    atorvastatin  40 mg oral Nightly     budesonide  0.5 mg nebulization BID    cefTRIAXone  1 g intravenous q24h    clopidogrel  75 mg oral Daily    [START ON 1/22/2024] furosemide  20 mg oral Daily    insulin lispro  0-5 Units subcutaneous TID    ipratropium-albuteroL  3 mL nebulization TID    levothyroxine  75 mcg oral Daily    methylPREDNISolone sodium succinate (PF)  20 mg intravenous q12h    oxygen   inhalation Continuous - 02/gases    pantoprazole  40 mg oral Daily before breakfast    polyethylene glycol  17 g oral Daily     PRN medications   Medication    acetaminophen    Or    acetaminophen    dextrose 10 % in water (D10W)    dextrose    glucagon    ipratropium-albuteroL    oxygen     Continuous Medications   Medication Dose Last Rate       Physical Exam:  General: No acute distress,  A&O x3.  Frail  Skin: Warm and dry  Neck: JVD is not elevated  ENT: Moist mucous membranes no lesions appreciated  Pulmonary: Mildly diminished bilaterally particular at the lung bases  Cards: Regular rate rhythm, no murmurs gallops or rubs appreciated normal S1-S2  Abdomen: Soft nontender nondistended  Extremities: No edema or cyanosis  Psych: Appropriate mood and affect      A/P     1.  NSTEMI: Initial symptoms of fatigue and weakness.  Echo showed new wall motion abnormality in the LAD territory with elevated troponins.  Left heart cath revealed  mid LAD moderate disease in the remaining coronary vessels.  Decision was made for medical management for now.  Continue aspirin, Plavix and high intensity statin therapy.       2.  Heart failure reduced ejection fraction: EF of 40 to 45%: Presented with acute on chronic exacerbation, bilateral pleural effusions, transaminitis felt to be related to congestion and volume overload.  Modest improvement with IV diuresis although limited due to low blood pressures.  Okay to transition to oral Lasix 20 mg a day.  Add lisinopril 2.5 mg daily for neurohormonal blockade.  Patient remains on supplemental oxygen which she will  likely need moving forward.    (This note was generated with voice recognition software and may contain errors including spelling, grammar, syntax and missed recognition of what was dictated, of which may not have been fully corrected)     Code Status:  Full Code    Faisal Krueger MD PhD

## 2024-01-21 NOTE — CONSULTS
Assessment and Plan  Patient is 83 y.o. female  with the following medical Problems:  Left lower lobe pneumonia  Acute hypoxic respiratory failure requiring supplemental oxygen  COPD with mild acute exacerbation.  Left lower lobe atelectasis  Bilateral small pleural effusion  Secondary pulmonary hypertension  Scattered pulmonary nodules, largest measuring up to 1.1 cm in the lingula.  NSTEMI  Heart failure with moderately reduced ejection fraction    Plan of Care:  Continue with ceftriaxone and azithromycin  Pneumonia workup is NGTD.  CT scan findings are suggestive of small left lower lobe pneumonia with small bilateral pleural effusions however patient has CT scan findings just of of COPD  Scheduled Solu-Medrol with scheduled DuoNeb and Pulmicort.  Effusions are small.  No indication for thoracentesis at this stage.  If effusions worsen will consider thoracentesis.  Patient is currently full anticoagulation with heparin drip for NSTEMI covers for DVT prophylaxis.  GI prophylaxis with Protonix.  Incentive spirometer to improve atelectasis.  Strictly avoid benzodiazepine and opioid.        History of Present Illness:      Patient is a 83-year-old woman with HTN, HLD, hypothyroidism, CKD Stage III, T2DM who presented to [facility] ED on 1/17/24 with confusion for the last week and following a mechanical fall.  Patient's symptoms had been going on for the last 5 days with acute worsening in symptoms over the last 24 hours. During this time the patient has endorses worsening cough, weakness, and confusion. The patient endorses poor PO intake and increased lethargy. She denies any chest pain, shortness of breath, fever or chills during this time. She denies any weight gain or peripheral edema as well. Patient denies any history of clots, MI, or CHF. Last night/this morning, the patient fell after getting up from bed. It was unwitnessed; however, the patient's Son heard the fall and quickly came to the scene. Patient denies  hitting her head. Patient is not on any blood thinners. Family mentions that the time of presenting to the ED the patient had been more altered and not like herself than she had been the following week/     In the ED, vitals were significant for HR 46, SpO2 77% on RA, /53. Patient was placed on 6L NC with improvement in hypoxia to 94%. Labs significant for K 5.4, Cr 2.12 (from 1.06), , , BNP 2731, Trop 556 -> 769, d-dimer 1748, Hgb 10.4 (from 14.5). CXR showed fibrosis and/or mild or early congestion and left lower lung atelectasis or infiltrate. Patient was started on heparin gtt in ED. Patient was also given 2g IV magnesium, 125mg Solu-Medrol, and 40mg IVP lasix in the ED.      PMH: As stated above.   PSH: No previous surgeries on file.  Social: Current smoker, no alcohol use, no illicit drug use  Family: Patient denies any family history of heart disease or clots. She denies personal history of clots  Medications/Allergies: NKDA    Daily progress:  January 19, 2023: Patient underwent left-sided heart catheterization today.  She was found to have  of LAD.  She remains on supplemental oxygen.  She has no fever, chills, rigors.  Culture remains negative to date.      January 20, 2023: Patient continues to be on nasal cannula at 5.5 L/min.  She has no fever, chills, or rigors.  She continues to be on ceftriaxone and azithromycin.  She denies chest pain.    Past Surgical History:   Procedure Laterality Date    OTHER SURGICAL HISTORY  07/13/2022    No history of surgery       Family History:   No relevant family history has been documented for this patient.    Allergies:     No Known Allergies      Social history:   reports that she has been smoking cigarettes. She has been smoking an average of .25 packs per day. She has been exposed to tobacco smoke. She has never used smokeless tobacco.    Current Medications:   No recently discontinued medications to reconcile    Review of Systems:   General:  denies weight gain, denies loss of appetite, fever, chills, night sweats.  HEENT: denies headaches, dizziness, head trauma, visual changes, eye pain, tinnitus, nosebleeds, hoarseness or throat pain    Respiratory: denies chest pain, +ve dyspnea, cough but no hemoptysis  Cardiovascular: denies orthopnea, paroxysmal nocturnal dyspnea, leg swelling, and previous heart attack.    Gastrointestinal: denies pain, nausea vomiting, diarrhea, constipation, melena or bleeding.  Genitourinary: denies hematuria, frequency, urgency or dysuria  Neurology: denies syncope, seizures, paralysis, paraesthesia   Endocrine: denies polyuria, polydipsia, skin or hair changes, and heat or cold intolerance  Musculoskeletal: denies joint pain, swelling, arthritis or myalgia  Hematologic: denies bleeding, adenopathy and easy bruising  Skin: denies rashes and skin discoloration  Psychiatry: denies depression    Physical Exam:   Vital Signs:   Vitals:    01/20/24 1945   BP:    Pulse:    Resp:    Temp:    SpO2: 95%       Input/Output:    Intake/Output Summary (Last 24 hours) at 1/20/2024 1949  Last data filed at 1/20/2024 0900  Gross per 24 hour   Intake 1503 ml   Output 401 ml   Net 1102 ml         Oxygen requirements:    Ventilator Information:  FiO2 (%):  [28 %-44 %] 40 %          General appearance: Not in pain or distress, in no respiratory distress    HEENT: Atraumatic/normocephalic, EOMI, RAMAKRISHNA, pharynx clear, moist mucosa, redness of the uvula appreciated,   Neck: Supple, no jugular venous distension, lymphadenopathy, thyromegaly or carotid bruits  Chest: Decreased breath sounds, +ve faint wheezing, +ve crackles and no tenderness over ribs   Cardiovascular: Normal S1, S2, regular rate and rhythm, no murmur, rub or gallop  Abdomen: Normal sounds present, soft, lax with no tenderness, no hepatosplenomegaly, and no masses  Extremities: No edema. Pulses are equally present.   Skin: intact, no rashes   Neurologic: Alert and oriented x 2-3, No  focal deficit     Investigations:  Labs, radiological imaging and cardiac work up were personally reviewed          .       STAFF PHYSICIAN NOTE OF PERSONAL INVOLVEMENT IN CARE  As the attending physician, I certify that I personally reviewed the patient's history and personally examined the patient to confirm the physical findings described above, and that I reviewed the relevant imaging studies and available reports.  I also discussed the differential diagnosis and all of the proposed management plans with the patient and individuals accompanying the patient to this visit.  They had the opportunity to ask questions about the proposed management plans and to have those questions answered.

## 2024-01-21 NOTE — NURSING NOTE
Pt spo2 down to 78% while ambulating on room air.  83-88% on room air while resting. Put on 1 L o2 pulse ox 88-92% at rest.

## 2024-01-21 NOTE — PROGRESS NOTES
Assessment and Plan  Patient is 83 y.o. female  with the following medical Problems:  Left lower lobe pneumonia  Acute hypoxic respiratory failure requiring supplemental oxygen  COPD with mild acute exacerbation.  Left lower lobe atelectasis  Bilateral small pleural effusion  Secondary pulmonary hypertension  Scattered pulmonary nodules, largest measuring up to 1.1 cm in the lingula.  NSTEMI  Heart failure with moderately reduced ejection fraction    Plan of Care:  Continue with ceftriaxone for 7 days . S/P azithromycin  Okay to be discharged from pulmonary point of view on Levaquin for 5 days  Pneumonia workup is NGTD.  CT scan findings are suggestive of small left lower lobe pneumonia with small bilateral pleural effusions however patient has CT scan findings of COPD  Scheduled Solu-Medrol with scheduled DuoNeb and Pulmicort.  Effusions are small.  No indication for thoracentesis at this stage.  If effusions worsen will consider thoracentesis.  Patient is currently full anticoagulation with heparin drip for NSTEMI covers for DVT prophylaxis.  GI prophylaxis with Protonix.  Incentive spirometer to improve atelectasis.  Strictly avoid benzodiazepine and opioid.        History of Present Illness:      Patient is a 83-year-old woman with HTN, HLD, hypothyroidism, CKD Stage III, T2DM who presented to [facility] ED on 1/17/24 with confusion for the last week and following a mechanical fall.  Patient's symptoms had been going on for the last 5 days with acute worsening in symptoms over the last 24 hours. During this time the patient has endorses worsening cough, weakness, and confusion. The patient endorses poor PO intake and increased lethargy. She denies any chest pain, shortness of breath, fever or chills during this time. She denies any weight gain or peripheral edema as well. Patient denies any history of clots, MI, or CHF. Last night/this morning, the patient fell after getting up from bed. It was unwitnessed; however,  the patient's Son heard the fall and quickly came to the scene. Patient denies hitting her head. Patient is not on any blood thinners. Family mentions that the time of presenting to the ED the patient had been more altered and not like herself than she had been the following week/     In the ED, vitals were significant for HR 46, SpO2 77% on RA, /53. Patient was placed on 6L NC with improvement in hypoxia to 94%. Labs significant for K 5.4, Cr 2.12 (from 1.06), , , BNP 2731, Trop 556 -> 769, d-dimer 1748, Hgb 10.4 (from 14.5). CXR showed fibrosis and/or mild or early congestion and left lower lung atelectasis or infiltrate. Patient was started on heparin gtt in ED. Patient was also given 2g IV magnesium, 125mg Solu-Medrol, and 40mg IVP lasix in the ED.      PMH: As stated above.   PSH: No previous surgeries on file.  Social: Current smoker, no alcohol use, no illicit drug use  Family: Patient denies any family history of heart disease or clots. She denies personal history of clots  Medications/Allergies: NKDA    Daily progress:  January 19, 2023: Patient underwent left-sided heart catheterization today.  She was found to have  of LAD.  She remains on supplemental oxygen.  She has no fever, chills, rigors.  Culture remains negative to date.      January 20, 2023: Patient continues to be on nasal cannula at 5.5 L/min.  She has no fever, chills, or rigors.  She continues to be on ceftriaxone and azithromycin.  She denies chest pain.    January 21, 2024: Had an uneventful night.  She is currently on 2 L nasal cannula.  She denies fever, chills, or rigors.    Past Surgical History:   Procedure Laterality Date    OTHER SURGICAL HISTORY  07/13/2022    No history of surgery       Family History:   No relevant family history has been documented for this patient.    Allergies:     No Known Allergies      Social history:   reports that she has been smoking cigarettes. She has been smoking an average of  .25 packs per day. She has been exposed to tobacco smoke. She has never used smokeless tobacco.    Current Medications:   No recently discontinued medications to reconcile    Review of Systems:   General: denies weight gain, denies loss of appetite, fever, chills, night sweats.  HEENT: denies headaches, dizziness, head trauma, visual changes, eye pain, tinnitus, nosebleeds, hoarseness or throat pain    Respiratory: denies chest pain, +ve dyspnea, cough but no hemoptysis  Cardiovascular: denies orthopnea, paroxysmal nocturnal dyspnea, leg swelling, and previous heart attack.    Gastrointestinal: denies pain, nausea vomiting, diarrhea, constipation, melena or bleeding.  Genitourinary: denies hematuria, frequency, urgency or dysuria  Neurology: denies syncope, seizures, paralysis, paraesthesia   Endocrine: denies polyuria, polydipsia, skin or hair changes, and heat or cold intolerance  Musculoskeletal: denies joint pain, swelling, arthritis or myalgia  Hematologic: denies bleeding, adenopathy and easy bruising  Skin: denies rashes and skin discoloration  Psychiatry: denies depression    Physical Exam:   Vital Signs:   Vitals:    01/21/24 1324   BP: 99/64   Pulse: 90   Resp:    Temp: 36.8 °C (98.2 °F)   SpO2: 90%       Input/Output:    Intake/Output Summary (Last 24 hours) at 1/21/2024 1516  Last data filed at 1/21/2024 0900  Gross per 24 hour   Intake 150 ml   Output --   Net 150 ml         Oxygen requirements:    Ventilator Information:  FiO2 (%):  [28 %-40 %] 28 %          General appearance: Not in pain or distress, in no respiratory distress    HEENT: Atraumatic/normocephalic, EOMI, RAMAKRISHNA, pharynx clear, moist mucosa, redness of the uvula appreciated,   Neck: Supple, no jugular venous distension, lymphadenopathy, thyromegaly or carotid bruits  Chest: Decreased breath sounds, +ve faint wheezing, +ve crackles and no tenderness over ribs   Cardiovascular: Normal S1, S2, regular rate and rhythm, no murmur, rub or  gallop  Abdomen: Normal sounds present, soft, lax with no tenderness, no hepatosplenomegaly, and no masses  Extremities: No edema. Pulses are equally present.   Skin: intact, no rashes   Neurologic: Alert and oriented x 2, No focal deficit     Investigations:  Labs, radiological imaging and cardiac work up were personally reviewed          .       STAFF PHYSICIAN NOTE OF PERSONAL INVOLVEMENT IN CARE  As the attending physician, I certify that I personally reviewed the patient's history and personally examined the patient to confirm the physical findings described above, and that I reviewed the relevant imaging studies and available reports.  I also discussed the differential diagnosis and all of the proposed management plans with the patient and individuals accompanying the patient to this visit.  They had the opportunity to ask questions about the proposed management plans and to have those questions answered.

## 2024-01-21 NOTE — PROGRESS NOTES
Lupe Oshea is a 83 y.o. female on day 4 of admission presenting with AMS (altered mental status).      Subjective   Patient was seen at bedside today.  No overnight events.  Weaned down to 2 L nasal cannula oxygen.  Saturating well.       Objective     Last Recorded Vitals  /54   Pulse 77   Temp 36.1 °C (97 °F)   Resp 20   Wt 56.2 kg (123 lb 14.4 oz)   SpO2 100%   Intake/Output last 3 Shifts:    Intake/Output Summary (Last 24 hours) at 1/21/2024 1246  Last data filed at 1/21/2024 0900  Gross per 24 hour   Intake 150 ml   Output --   Net 150 ml         Admission Weight  Weight: 56.2 kg (123 lb 14.4 oz) (01/17/24 1108)    Daily Weight  01/18/24 : 56.2 kg (123 lb 14.4 oz)    Image Results  Cardiac catheterization - coronary     [facility], Cath Lab, [address] [place] [address]    Cardiovascular Catheterization Report    Patient Name:      LUPE OSHEA Performing Physician:  Debra Pablo MD  Study Date:        1/19/2024            Verifying Physician:   Debra Pablo MD  MRN/PID:           [id number]             Cardiologist/Co-scrub:  Accession#:        [id number]         Ordering Provider:     Debra PABLO  Date of Birth/Age: 1940 / 83 years  Fellow:  Gender:            F                    Fellow:  Encounter#:        [id number]       Study: Left Heart Cath       Indications:  LUPE OSHEA is a 84 year old female who presents with coronary artery disease, dyslipidemia, hypertension, chronic pulmonary disease and an anginal equivalent chest pain assessment (i.e. dyspnea on exertion believed to be from ischemia). NSTE - ACS.     Appropriate Use Criteria:  Non ST elevation myocardial infarction with intermediate risk score; AUC score = 8.      Procedure Description:  After infiltration with 2% Lidocaine, the right radial artery was cannulated with a modified Seldinger technique. Subsequently a 6 [place] sheath was placed retrograde in the right radial artery. Selective coronary catheterization was performed using a 5 Fr catheter(s) exchanged over a guide wire to cannulate the coronary arteries.  Additional catheter(s) used to visualize the coronary arteries were: Tiger 4.0. Multiple injections of contrast were made into the left and right coronary arteries with angiograms recorded in multiple projections. After completion of the procedure, the arterial sheath was pulled and a TR Band Radial Compression Device was utilized to obtain patent hemostasis.     Coronary Angiography:  The coronary circulation is right dominant.     Coronary Angiography Comments:  Left main coronary artery contains mild luminal irregularities.    Left anterior descending artery contains a 70% stenosis proximally. There is a total occlusion in its midportion after the takeoff of the septal artery. There are left to left collaterals supplying the first diagonal artery. The right to left collaterals supplying the second diagonal artery.    Left circumflex artery contains a 30% stenosis ostially. The obtuse marginal artery contains mild luminal irregularities.    The ramus intermedius is a diminutive vessel with mild luminal irregularities.    The right coronary artery contains a 30% stenosis in its midportion.  Posterior descending artery contains an 80% stenosis in the mid PDA. Posterolateral branch contains mild luminal irregularities.         Left Ventriculography:  EDP 16.     Hemo Personnel:  +--------------------+---------+  Name                Duty       +--------------------+---------+  Ronni Muro MD 1  +--------------------+---------+       Hemodynamic Pressures:     +----+-------------------+---------+------------+-------------+------+---------+  Site      Date Time       Phase    Systolic    Diastolic    ED  Mean mmHg                           Name       mmHg        mmHg      mmHg            +----+-------------------+---------+------------+-------------+------+---------+    AO  1/19/2024 1:14:10  O2 REST         109           45             70                       PM                                                   +----+-------------------+---------+------------+-------------+------+---------+    LV  1/19/2024 1:23:43  O2 REST          94           10    16                                PM                                                   +----+-------------------+---------+------------+-------------+------+---------+    LV  1/19/2024 1:23:52  O2 REST          89            8    16                                PM                                                   +----+-------------------+---------+------------+-------------+------+---------+   LVp  1/19/2024 1:23:57  O2 REST          99           10    19                                PM                                                   +----+-------------------+---------+------------+-------------+------+---------+   AOp  1/19/2024 1:24:02  O2 REST          92           40             61                       PM                                                   +----+-------------------+---------+------------+-------------+------+---------+       Complications:  No in-lab complications observed.     Cardiac Cath Post Procedure Notes:  Post Procedure Diagnosis: CAD.  Blood Loss:               Estimated blood loss during the procedure was < 10                            mls.  Specimens Removed:        Number of specimen(s) removed: none.       Recommendations:  Maximize medical therapy.  Lipid lowering agent or Statin therapy.  Further recommendations per Dr. Krueger.  Aspirin  therapy.    ____________________________________________________________________________________  CONCLUSIONS:   1. Total occlusion of the mid LAD with collateralization to the first and second diagonal arteries.   2. Mild circumflex and RCA disease.   3. Severe branch vessel disease of the PDA.   4. Mildly elevated left heart filling pressures.    ICD 10 Codes:  Non ST elevation (NSTEMI) myocardial infarction-I21.4     CPT Codes:  Left Heart Cath (visualization of coronaries) and LV-[id number]; Moderate Sedation Services 1st additional 15 minutes patient >5 years-[id number]     [id number] Ronni Muro MD  Performing Physician  Electronically signed by [id number] Ronni Muro MD on 1/19/2024 at 1:40:40 PM         ** Final **  US renal complete  Narrative: Interpreted By:  Ronni Noel,   STUDY:  US RENAL COMPLETE;  1/18/2024 6:42 pm      INDICATION:  Signs/Symptoms:looking for evidence of chronicity of kidney disease.      COMPARISON:  None.      ACCESSION NUMBER(S):  [id number]      ORDERING CLINICIAN:  CEDRICK ESPINAL      TECHNIQUE:  Multiple images of the kidneys were obtained  , with color Doppler  for blood flow.  It is noted the exam was limited due to patient  habitus.      FINDINGS:  RIGHT KIDNEY:  The right kidney measures 7.1 cm in length.  There is mild cortical  thinning and atrophy. A 2.5 cm cortical cyst is present at the  midpole. A 2 cm cyst is present at the upper pole. There is a 2.7 cm  parapelvic cyst inferiorly.. A 9 mm cortical echogenicity is most  likely an angiomyolipoma. However, an echogenic renal cell carcinoma  is not completely excluded. Therefore, correlation with CT or MRI  would be recommended for more definitive demonstration of fat and  confirmation of angiomyolipoma. No hydronephrosis or evidence of  nephrolithiasis. Color Doppler demonstrates renal blood flow.      LEFT KIDNEY:  The left kidney measures 8.6 cm in length.  There is also mild  cortical thinning and atrophy..   No hydronephrosis  or evidence of  nephrolithiasis.. Color Doppler demonstrates renal blood flow.      BLADDER:  A 3.4 x 2.6 x 7.3 cm lesion is at the floor of the bladder. Centrally  this is predominantly echo lucent , but blood flow is demonstrated at  the periphery. This would be most suspicious for a necrotic malignant  mass. Urological consultation recommended.      OTHER FINDINGS:  Moderate left pleural effusion.      Impression:     1. Bladder mass as described.  2. 9 mm cortical echogenicity at the right kidney, probably an  angiomyolipoma. However, CT or MRI correlation would be recommended.  3. Left pleural effusion.      Signed by: Ronni Noel 1/19/2024 10:14 AM  Dictation workstation:   [id number]      Physical Exam  General:  Pleasant and cooperative. No apparent distress.  HEENT:  Normocephalic, atraumatic, mucus membranes moist.   Neck:  Trachea midline.     Chest: no crackles or significant wheezing  CV:  Regular rate and rhythm.  Positive S1/S2.   Abdomen: Bowel sounds present in all four quadrants, abdomen is soft, non-tender, non-distended.  Extremities:  no lower extremity edema or cyanosis.   Neurological:  AAOx3. No focal deficits.  Skin:  Warm and dry.  Relevant Results  Transthoracic Echo (TTE) Complete    Result Date: 1/18/2024    [facility], [address] [place] [address] [id number] [phone] and                                 Fax [phone] TRANSTHORACIC ECHOCARDIOGRAM REPORT  Patient Name:      ALPHONSO ESTRADA SRINIVAS Reading Physician:    [id number] Faisal Krueger MD Study Date:        1/18/2024            Ordering Provider:    [id number] DALI JEFFERY MRN/PID:           [id number]             Fellow: Accession#:        [id number]         Nurse: Date of Birth/Age: 1940 / 83 years  Sonographer:          Louise Peres                                                                [facility] Gender:            F                    Additional Staff: Height:            160.02 cm            Admit Date:           1/17/2024 Weight:            56.25 kg             Admission Status:     Observation -                                                               Priority discharge BSA:               1.58 m2              Encounter#:           [id number]                                         Department Location:  [facility] Blood Pressure: 101 /50 mmHg Study Type:    TRANSTHORACIC ECHO (TTE) COMPLETE Diagnosis/ICD: Non ST elevation (NSTEMI) myocardial infarction-I21.4 Indication:    Hypoxic CPT Code:      Echo Complete w Full Doppler-[id number] Patient History: Smoker: Current. Study Detail: The following Echo studies were performed: 2D, M-Mode, Doppler and               color flow.  PHYSICIAN INTERPRETATION: Left Ventricle: The left ventricular systolic function is mildly decreased, with an estimated ejection fraction of 40-45%. Wall motion is abnormal. The left ventricular cavity size is normal. Spectral Doppler shows a pseudonormal pattern of left ventricular diastolic filling. LV Wall Scoring: The entire anterior septum and apex are hypokinetic. Left Atrium: The left atrium is normal in size. Right Ventricle: The right ventricle is upper limits of normal in size. There is normal right ventricular global systolic function. Right Atrium: The right atrium is moderately dilated. Aortic Valve: The aortic valve is trileaflet. There is mild to moderate aortic valve cusp calcification. There is mild aortic valve thickening. There is evidence of mildly elevated transaortic gradients consistent with sclerosis of the aortic valve. There is no evidence of aortic valve regurgitation. The peak instantaneous gradient of the aortic valve is 9.7 mmHg. Mitral Valve: The mitral valve is mildly thickened. There is mild to moderate mitral valve regurgitation. Tricuspid Valve: The tricuspid valve is structurally normal.  There is trace tricuspid regurgitation. The Doppler estimated RVSP is moderately elevated at 50.5 mmHg. Pulmonic Valve: The pulmonic valve is structurally normal. There is mild pulmonic valve regurgitation. Pericardium: There is a trivial pericardial effusion. Pleural: There is a small bilateral pleural effusion. Aorta: The aortic root is normal. Systemic Veins: The inferior vena cava appears dilated. There is less than 50% IVC collapse with inspiration. In comparison to the previous echocardiogram(s): There are no prior studies on this patient for comparison purposes.  CONCLUSIONS:  1. Left ventricular systolic function is mildly decreased with a 40-45% estimated ejection fraction.  2. Entire anterior septum and apex are abnormal.  3. Spectral Doppler shows a pseudonormal pattern of left ventricular diastolic filling.  4. The right atrium is moderately dilated.  5. Mild to moderate mitral valve regurgitation.  6. Moderately elevated right ventricular systolic pressure.  7. Aortic valve sclerosis. QUANTITATIVE DATA SUMMARY: 2D MEASUREMENTS:                          Normal Ranges: Ao Root d:     2.80 cm   (2.0-3.7cm) LAs:           4.00 cm   (2.7-4.0cm) IVSd:          0.96 cm   (0.6-1.1cm) LVPWd:         0.76 cm   (0.6-1.1cm) LVIDd:         4.54 cm   (3.9-5.9cm) LVIDs:         2.77 cm LV Mass Index: 80.6 g/m2 LV % FS        39.0 % LA VOLUME:                               Normal Ranges: LA Vol A4C:        40.8 ml    (22+/-6mL/m2) LA Vol A2C:        42.5 ml LA Vol BP:         43.4 ml LA Vol Index A4C:  25.9ml/m2 LA Vol Index A2C:  27.0 ml/m2 LA Vol Index BP:   27.5 ml/m2 LA Area A4C:       15.8 cm2 LA Area A2C:       15.5 cm2 LA Major Axis A4C: 5.2 cm LA Major Axis A2C: 4.8 cm LA Volume Index:   26.2 ml/m2 LA Vol A4C:        38.6 ml LA Vol A2C:        42.5 ml RA VOLUME BY A/L METHOD:                       Normal Ranges: RA Area A4C: 17.4 cm2 M-MODE MEASUREMENTS:                  Normal Ranges: Ao Root: 3.20 cm  (2.0-3.7cm) LAs:     4.10 cm (2.7-4.0cm) AORTA MEASUREMENTS:                    Normal Ranges: Asc Ao, d: 2.90 cm (2.1-3.4cm) LV SYSTOLIC FUNCTION BY 2D PLANIMETRY (MOD):                     Normal Ranges: EF-A4C View: 61.2 % (>=55%) EF-A2C View: 58.1 % EF-Biplane:  59.4 % LV DIASTOLIC FUNCTION:                               Normal Ranges: MV Peak A:        0.91 m/s    (0.42-0.7 m/s) MV lateral e'     0.07 m/s MV medial e'      0.05 m/s MV A Dur:         137.00 msec PulmV Sys Ho:    76.90 cm/s PulmV Puckett Ho:   46.70 cm/s PulmV S/D Ho:    1.60 PulmV A Revs Ho: 25.60 cm/s PulmV A Revs Dur: 106.00 msec MITRAL VALVE:                 Normal Ranges: MV DT: 198 msec (150-240msec) AORTIC VALVE:                         Normal Ranges: AoV Vmax:      1.56 m/s (<=1.7m/s) AoV Peak P.7 mmHg (<20mmHg) LVOT Max Ho:  0.64 m/s (<=1.1m/s) LVOT VTI:      14.10 cm LVOT Diameter: 1.80 cm  (1.8-2.4cm) AoV Area,Vmax: 1.04 cm2 (2.5-4.5cm2)  RIGHT VENTRICLE: RV Basal 3.81 cm RV Mid   1.97 cm RV Major 7.7 cm TAPSE:   23.7 mm RV s'    0.11 m/s TRICUSPID VALVE/RVSP:                             Normal Ranges: Peak TR Velocity: 2.98 m/s RV Syst Pressure: 50.5 mmHg (< 30mmHg) IVC Diam:         2.00 cm Pulmonary Veins: PulmV A Revs Dur: 106.00 msec PulmV A Revs Ho: 25.60 cm/s PulmV Puckett Ho:   46.70 cm/s PulmV S/D Ho:    1.60 PulmV Sys Ho:    76.90 cm/s  [id number] Faisal Krueger MD Electronically signed on 2024 at 12:16:27 PM  Wall Scoring  ** Final **     NM Lung Perfusion    Result Date: 2024  Interpreted By:  Cynthia Islas and Maltbie Grace STUDY: NM LUNG PERFUSION;  2024 8:54 am   INDICATION: Signs/Symptoms:SOB, hypoxia, elevated d-dimer.   COMPARISON: CT chest 2024   ACCESSION NUMBER(S): [id number]   ORDERING CLINICIAN: PING POTTS   TECHNIQUE: DIVISION OF NUCLEAR MEDICINE PERFUSION LUNG SCANS   Multiple perfusion images of the lungs were acquired after the intravenous administration of 4.1 mCi of Tc-99m  macroaggregated albumin (MAA). In addition, SPECT of the chest was performed.   FINDINGS: Perfusion images of both lungs demonstrate mild heterogeneity throughout the lung fields bilaterally. No distinct wedge-shaped subsegmental or segmental perfusion defect seen on SPECT to suggest acute pulmonary embolism (low probability).       1. No distinct wedge-shaped subsegmental or segmental perfusion defect seen on SPECT to suggest acute pulmonary embolism (low probability).   The interpretation above is based on modified PIOPED II and PISAPED criteria.   I personally reviewed the images/study and I agree with the findings as stated by Nuclear Medicine fellow Sol Gil MD. This study was interpreted at [place], [place].   Signed by: Cynthia Islas 1/18/2024 10:17 AM Dictation workstation:   [id number]    CT chest wo IV contrast    Result Date: 1/18/2024  Interpreted By:  Finkelstein, Evan, STUDY: CT CHEST WO IV CONTRAST;  1/18/2024 12:59 am   INDICATION: Signs/Symptoms:New hypoxia, concern for pneumonia.   COMPARISON: Chest radiograph 01/17/2024   ACCESSION NUMBER(S): [id number]   ORDERING CLINICIAN: PING POTTS   TECHNIQUE: Axial CT images of the chest obtained  without IV contrast.  Sagittal and coronal reconstructions were created..   FINDINGS: CHEST WALL AND LOWER NECK: Within normal limits. UPPER ABDOMEN: There are vascular calcifications involving the renal vessels bilaterally along with suspected nonobstructing stones. Simple appearing cysts in the right kidney. Right suprarenal hypodensity measuring approximately 1.7 cm favored to represent an adrenal adenoma.   VESSELS: Enlarged main pulmonary artery measures up to 3.3 cm. Atherosclerotic calcifications in the aorta and coronary arteries. HEART: Cardiomegaly. No pericardial effusion. MEDIASTINUM AND HERMAN: 1 cm subcarinal lymph node. Prominent lymph nodes scattered elsewhere throughout the mediastinum as well.  LUNG, PLEURA, AND LARGE AIRWAYS: Moderate left and small right pleural effusions. Thickened interlobular septal markings. Patchy left basilar airspace opacity. Dependent atelectasis. Moderate emphysematous changes scattered throughout the lungs bilaterally. Ground-glass opacities most pronounced along the periphery of the lungs. 3 mm pleural-based nodule along the periphery of the right middle lobe, best seen image 162 of series 202. 1.1 cm nodule in the lingula image 135.   BONES: No acute osseous abnormality.       Patchy left basilar airspace opacity concerning for pneumonia. Additional dependent opacities are favored to represent superimposed atelectasis.   Ground-glass opacities most pronounced along the periphery of the lungs which may be infectious or inflammatory in etiology.   Moderate left and small right pleural effusions.   Cardiomegaly with thickened interlobular septal markings suggesting pulmonary interstitial edema.   Enlarged main pulmonary artery measures up to 3.3 cm and may be seen with pulmonary arterial hypertension.   Prominent nonspecific mediastinal lymph nodes, possibly reactive.   Scattered pulmonary nodules, largest measuring up to 1.1 cm in the lingula. Recommend follow-up imaging with CT at 3-6 months as per Fleischner criteria.   MACRO: None.   Signed by: Evan Finkelstein 1/18/2024 1:30 AM Dictation workstation:   [id number]    CT head wo IV contrast    Result Date: 1/18/2024  Interpreted By:  Finkelstein, Evan, STUDY: CT HEAD WO IV CONTRAST;  1/18/2024 12:59 am   INDICATION: Signs/Symptoms:Altered Mental Status.   COMPARISON: None.   ACCESSION NUMBER(S): [id number]   ORDERING CLINICIAN: PING POTTS   TECHNIQUE: Axial noncontrast CT images of the head with coronal and sagittal reconstructions.   FINDINGS: EXTRACRANIAL SOFT TISSUES: Unremarkable.   CALVARIUM: No depressed skull fracture. No destructive osseous lesion.   PARANASAL SINUSES/MASTOIDS: The visualized paranasal sinuses  and mastoid air cells are aerated.   HEMORRHAGE: No acute intracranial hemorrhage.   BRAIN PARENCHYMA: Gray-white matter interfaces are preserved. No mass effect or midline shift. There are nonspecific scattered white matter hypodensities.   VENTRICLES and EXTRA-AXIAL SPACES: Parenchymal atrophy with prominence of the ventricles and cortical sulci.   OTHER FINDINGS: There are calcifications within the cavernous carotids       No acute intracranial hemorrhage, mass effect or midline shift.   Nonspecific scattered white matter hypodensities favored to represent sequela of small vessel ischemia.     MACRO: None.   Signed by: Evan Finkelstein 1/18/2024 1:21 AM Dictation workstation:   [id number]    Lower extremity venous duplex bilateral    Result Date: 1/18/2024  Interpreted By:  Jerry Prieto, STUDY: [facility] LOWER EXTREMITY VENOUS DUPLEX BILATERAL;  1/18/2024 12:11 am   INDICATION: Signs/Symptoms:Concerns for PE and DVTs.   COMPARISON: None.   ACCESSION NUMBER(S): [id number]   ORDERING CLINICIAN: PING POTTS   TECHNIQUE: Grayscale, color and spectral Doppler sonographic images of the bilateral lower extremity deep venous system.   FINDINGS: RIGHT: There is normal compressibility of the common femoral vein, saphenous femoral junction, profunda femoral vein and popliteal vein. The posterior tibial and peroneal veins  demonstrate normal color flow and compressibility. There is normal spontaneous and phasic variation throughout the leg by spectral doppler.   LEFT: There is normal compressibility of the common femoral vein, saphenous femoral junction, profunda femoral vein and popliteal vein. The posterior tibial and peroneal veins  are suboptimally visualized. There is normal spontaneous and phasic variation throughout the leg by spectral doppler.   OTHER FINDINGS: None.       Suboptimal visualization of the left calf veins. No sonographic evidence DVT in the visualized vessels of bilateral lower extremities.    MACRO: None   Signed by: Jerry Prieto 1/18/2024 12:32 AM Dictation workstation:   [id number]    XR chest 1 view    Result Date: 1/17/2024  Interpreted By:  Ronni Noel, STUDY: XR CHEST 1 VIEW;  1/17/2024 3:21 pm   INDICATION: Signs/Symptoms:fatigue/SOB.   COMPARISON: None.   ACCESSION NUMBER(S): [id number]   ORDERING CLINICIAN: SERAFIN RAO   FINDINGS: CARDIOMEDIASTINAL SILHOUETTE AND VASCULATURE:   Cardiac size:  Mild cardiomegaly   Aortic shadow:  Within normal limits.   Mediastinal contours: Within normal limits.   Pulmonary vasculature:  Mild cephalization of the pulmonary blood flow suggesting mild congestion.   LUNGS: There is mild interstitial prominence probably accentuated from a somewhat shallow inspiration, but may be due to mild or early congestion. Slight opacity left lower lung is probably from atelectasis and/or fibrosis, or possibly focal infiltrate. There is blunting at the costophrenic angle that may be from a small effusion. Follow-up as clinically warranted.   ABDOMEN AND OTHER FINDINGS: No remarkable upper abdominal findings.   BONES: No acute osseous changes.       1.  Fibrosis and/or mild or early congestion and left lower lung atelectasis or infiltrate.   Signed by: Ronni Noel 1/17/2024 3:33 PM Dictation workstation:   [id number]    Results for orders placed or performed during the hospital encounter of 01/17/24 (from the past 24 hour(s))   POCT GLUCOSE   Result Value Ref Range    POCT Glucose 197 (H) 74 - 99 mg/dL   POCT GLUCOSE   Result Value Ref Range    POCT Glucose 188 (H) 74 - 99 mg/dL   POCT GLUCOSE   Result Value Ref Range    POCT Glucose 106 (H) 74 - 99 mg/dL   Renal Function Panel   Result Value Ref Range    Glucose 101 (H) 74 - 99 mg/dL    Sodium 142 136 - 145 mmol/L    Potassium 4.0 3.5 - 5.3 mmol/L    Chloride 99 98 - 107 mmol/L    Bicarbonate 38 (H) 21 - 32 mmol/L    Anion Gap 9 (L) 10 - 20 mmol/L    Urea Nitrogen 34 (H) 6 - 23 mg/dL    Creatinine 1.21 (H) 0.50 - 1.05  mg/dL    eGFR 44 (L) >60 mL/min/1.73m*2    Calcium 8.3 (L) 8.6 - 10.3 mg/dL    Phosphorus 3.4 2.5 - 4.9 mg/dL    Albumin 3.4 3.4 - 5.0 g/dL   Magnesium   Result Value Ref Range    Magnesium 1.95 1.60 - 2.40 mg/dL   CBC   Result Value Ref Range    WBC 9.8 4.4 - 11.3 x10*3/uL    nRBC 0.8 (H) 0.0 - 0.0 /100 WBCs    RBC 3.52 (L) 4.00 - 5.20 x10*6/uL    Hemoglobin 9.7 (L) 12.0 - 16.0 g/dL    Hematocrit 32.6 (L) 36.0 - 46.0 %    MCV 93 80 - 100 fL    MCH 27.6 26.0 - 34.0 pg    MCHC 29.8 (L) 32.0 - 36.0 g/dL    RDW 16.0 (H) 11.5 - 14.5 %    Platelets 235 150 - 450 x10*3/uL   POCT GLUCOSE   Result Value Ref Range    POCT Glucose 213 (H) 74 - 99 mg/dL    Scheduled medications  [Held by provider] amLODIPine, 2.5 mg, oral, Daily  aspirin, 81 mg, oral, Daily  atorvastatin, 40 mg, oral, Nightly  budesonide, 0.5 mg, nebulization, BID  cefTRIAXone, 1 g, intravenous, q24h  clopidogrel, 75 mg, oral, Daily  [START ON 1/22/2024] furosemide, 20 mg, oral, Daily  insulin lispro, 0-5 Units, subcutaneous, TID  ipratropium-albuteroL, 3 mL, nebulization, TID  levothyroxine, 75 mcg, oral, Daily  methylPREDNISolone sodium succinate (PF), 20 mg, intravenous, q12h  oxygen, , inhalation, Continuous - 02/gases  pantoprazole, 40 mg, oral, Daily before breakfast  polyethylene glycol, 17 g, oral, Daily      Continuous medications       PRN medications  PRN medications: acetaminophen **OR** acetaminophen, dextrose 10 % in water (D10W), dextrose, glucagon, ipratropium-albuteroL, oxygen        Assessment/Plan    Susannah Oshea is a 83-year-old female with past medical history of HTN, HLD, hypothyroid, CKD 3, type II DM, presented to hospital with 1 week history of confusion and a mechanical fall.  She is being managed to medical service for evaluation management of acute hypoxic respiratory failure and other multiple medical issues.    Progress:  1/20: Wean oxygen down today to maintain 88 to 94% oxygen saturation.  Patient is on 5.5 L oxygen nasal  cannula today.  Cardiology following.  Day 3 of antibiotics  1/21: Day 4/5 of antibiotics.  Added lisinopril 2.5 daily and transitioned to 20 mg p.o. Lasix daily, per cardiology recs.  Failed ambulatory pulse ox, will need home oxygen.  Plan for discharge tomorrow.    # Acute hypoxic respiratory failure  # Left lower lobe pneumonia  # Left lower lobe atelectasis  # Acute exacerbation of COPD  # Scattered pulmonary nodules  - We will continue treatment of pneumonia with Rocephin day 3 and azithromycin day 3.    - Pulmonology is following.    - We will continue with IV Solu-Medrol 20 mg twice daily along with respiratory bronchopulmonary hygiene along with DuoNebs, Pulmicort as advised by pulm.     # NSTEMI  # Elevated troponin  # HFrEF  # Moderate aortic stenosis  - Patient did not complain of any central chest pain, however, troponins were elevated at 556 to 769 to 663.  Cardiology is following.  They advised to continue diuresis with PO 20 mg Lasix.  Continue with aspirin. Cardiology continues to follow.  Will continue with judicious diuresis.  -Heparin infusion stopped at 4:40 PM on 1/19/2024 after completion of 48 hours of treatment  - Echo showed 40 to 45% ejection fraction and moderate aortic stenosis  - Because patient has moderate aortic stenosis, it is extremely important to ensure patient does not become hypotensive.  She is preload dependent and if she is diuresed too much she can become hypotensive.  We will monitor blood pressures very closely.    # LAUREN, likely prerenal--resolved  # Hyperkalemia-resolved  - Nephrology following: Want to determine extent of diabetic nephropathy with appropriate tests including urine albumin, protein, creatinine.  Avoid nephrotoxic agents.  Will maintain strict MAGY's and will fluid restrict 1.5 L.  Continue to monitor BMP.    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  Ultrasound  renal showed large bladder mass but no hydronephrosis.  Urology consult advised to plan for outpatient cystoscopy B/L RGP TURBT as outpatient.  No acute surgical invention planned for this admission.    # Transaminitis  - This is likely secondary to acute infective process and possibly heart failure.  LFTs are downtrending.  On high intensity statin.    # HTN  # Hypothyroid  # Type II DM  - We will hold amlodipine for now.  Will continue Synthroid.  Continue with lispro sliding scale to ensure blood sugars are well-controlled.    - DVT PPx: Heparin subcu    Enoc Horvath,   PGY-1 Internal Medicine

## 2024-01-21 NOTE — PROGRESS NOTES
Lupe Oshea is a 83 y.o. female on day 4 of admission presenting with AMS (altered mental status).      Subjective   Patient still wants discharge and to have a cigarette.       Objective      still requiring oxygen.    Vitals 24HR  Heart Rate:  [73-91]   Temp:  [36.1 °C (97 °F)-36.6 °C (97.9 °F)]   Resp:  [20]   BP: (111-118)/(54-58)   SpO2:  [92 %-100 %]       Intake/Output last 3 Shifts:    Intake/Output Summary (Last 24 hours) at 1/21/2024 1051  Last data filed at 1/21/2024 0900  Gross per 24 hour   Intake 150 ml   Output --   Net 150 ml       Physical Exam    Patient is thin and cachectic appearing she has wheezes throughout.  No cardiac rub.  Minimal peripheral edema.    Assessment/Plan      Patient with underlying stage G3a/A3 CKD.    Repeat urine albumin indicates 366 mg/g.  This is consistent with diabetic nephropathy.  Once again I suspect previously reading of over 7 g was specimen error.    Current blood pressures are acceptable.    No change to the patient's regimen from a renal standpoint.  In the future may benefit from ACE.    Jorge L Guzman MD

## 2024-01-22 LAB
ANA SER QL HEP2 SUBST: NEGATIVE
CREAT UR-MCNC: 5.6 MG/DL (ref 20–320)
GLUCOSE BLD MANUAL STRIP-MCNC: 120 MG/DL (ref 74–99)
GLUCOSE BLD MANUAL STRIP-MCNC: 203 MG/DL (ref 74–99)
GLUCOSE BLD MANUAL STRIP-MCNC: 211 MG/DL (ref 74–99)
GLUCOSE BLD MANUAL STRIP-MCNC: 268 MG/DL (ref 74–99)
PLA2R IGG SERPL QL IF: NORMAL
PROT UR-ACNC: 21 MG/DL (ref 5–24)
PROT/CREAT UR: 3.75 MG/MG CREAT (ref 0–0.17)

## 2024-01-22 PROCEDURE — 99233 SBSQ HOSP IP/OBS HIGH 50: CPT

## 2024-01-22 PROCEDURE — 94640 AIRWAY INHALATION TREATMENT: CPT | Mod: MUE

## 2024-01-22 PROCEDURE — 2500000001 HC RX 250 WO HCPCS SELF ADMINISTERED DRUGS (ALT 637 FOR MEDICARE OP): Performed by: INTERNAL MEDICINE

## 2024-01-22 PROCEDURE — 2500000004 HC RX 250 GENERAL PHARMACY W/ HCPCS (ALT 636 FOR OP/ED): Performed by: INTERNAL MEDICINE

## 2024-01-22 PROCEDURE — 2500000005 HC RX 250 GENERAL PHARMACY W/O HCPCS: Performed by: INTERNAL MEDICINE

## 2024-01-22 PROCEDURE — 99232 SBSQ HOSP IP/OBS MODERATE 35: CPT | Performed by: STUDENT IN AN ORGANIZED HEALTH CARE EDUCATION/TRAINING PROGRAM

## 2024-01-22 PROCEDURE — 2500000002 HC RX 250 W HCPCS SELF ADMINISTERED DRUGS (ALT 637 FOR MEDICARE OP, ALT 636 FOR OP/ED): Performed by: INTERNAL MEDICINE

## 2024-01-22 PROCEDURE — 1200000002 HC GENERAL ROOM WITH TELEMETRY DAILY

## 2024-01-22 PROCEDURE — 82947 ASSAY GLUCOSE BLOOD QUANT: CPT

## 2024-01-22 PROCEDURE — 94761 N-INVAS EAR/PLS OXIMETRY MLT: CPT

## 2024-01-22 PROCEDURE — 2500000001 HC RX 250 WO HCPCS SELF ADMINISTERED DRUGS (ALT 637 FOR MEDICARE OP)

## 2024-01-22 PROCEDURE — 82570 ASSAY OF URINE CREATININE: CPT

## 2024-01-22 RX ADMIN — FUROSEMIDE 20 MG: 20 TABLET ORAL at 08:49

## 2024-01-22 RX ADMIN — INSULIN LISPRO 3 UNITS: 100 INJECTION, SOLUTION INTRAVENOUS; SUBCUTANEOUS at 20:50

## 2024-01-22 RX ADMIN — LEVOTHYROXINE SODIUM 75 MCG: 0.07 TABLET ORAL at 08:49

## 2024-01-22 RX ADMIN — BUDESONIDE INHALATION 0.5 MG: 0.5 SUSPENSION RESPIRATORY (INHALATION) at 07:14

## 2024-01-22 RX ADMIN — BUDESONIDE INHALATION 0.5 MG: 0.5 SUSPENSION RESPIRATORY (INHALATION) at 19:13

## 2024-01-22 RX ADMIN — Medication 1 L/MIN: at 08:00

## 2024-01-22 RX ADMIN — CEFTRIAXONE SODIUM 1 G: 1 INJECTION, SOLUTION INTRAVENOUS at 20:50

## 2024-01-22 RX ADMIN — IPRATROPIUM BROMIDE AND ALBUTEROL SULFATE 3 ML: .5; 3 SOLUTION RESPIRATORY (INHALATION) at 19:11

## 2024-01-22 RX ADMIN — CLOPIDOGREL 75 MG: 75 TABLET ORAL at 08:48

## 2024-01-22 RX ADMIN — IPRATROPIUM BROMIDE AND ALBUTEROL SULFATE 3 ML: .5; 3 SOLUTION RESPIRATORY (INHALATION) at 13:30

## 2024-01-22 RX ADMIN — PANTOPRAZOLE SODIUM 40 MG: 40 TABLET, DELAYED RELEASE ORAL at 06:00

## 2024-01-22 RX ADMIN — IPRATROPIUM BROMIDE AND ALBUTEROL SULFATE 3 ML: .5; 3 SOLUTION RESPIRATORY (INHALATION) at 07:12

## 2024-01-22 RX ADMIN — INSULIN LISPRO 2 UNITS: 100 INJECTION, SOLUTION INTRAVENOUS; SUBCUTANEOUS at 14:34

## 2024-01-22 RX ADMIN — POLYETHYLENE GLYCOL 3350 17 G: 17 POWDER, FOR SOLUTION ORAL at 08:48

## 2024-01-22 RX ADMIN — METHYLPREDNISOLONE SODIUM SUCCINATE 20 MG: 40 INJECTION, POWDER, FOR SOLUTION INTRAMUSCULAR; INTRAVENOUS at 17:07

## 2024-01-22 RX ADMIN — LISINOPRIL 2.5 MG: 5 TABLET ORAL at 08:48

## 2024-01-22 RX ADMIN — METHYLPREDNISOLONE SODIUM SUCCINATE 20 MG: 40 INJECTION, POWDER, FOR SOLUTION INTRAMUSCULAR; INTRAVENOUS at 05:57

## 2024-01-22 RX ADMIN — ASPIRIN 81 MG: 81 TABLET, CHEWABLE ORAL at 08:49

## 2024-01-22 RX ADMIN — ATORVASTATIN CALCIUM 40 MG: 40 TABLET, FILM COATED ORAL at 20:50

## 2024-01-22 ASSESSMENT — PAIN SCALES - GENERAL
PAINLEVEL_OUTOF10: 0 - NO PAIN
PAINLEVEL_OUTOF10: 0 - NO PAIN

## 2024-01-22 ASSESSMENT — PAIN - FUNCTIONAL ASSESSMENT
PAIN_FUNCTIONAL_ASSESSMENT: 0-10
PAIN_FUNCTIONAL_ASSESSMENT: 0-10

## 2024-01-22 NOTE — DISCHARGE INSTRUCTIONS
Thank you for attending [facility].    1.  You have a new medication.  This is aspirin 81 mg tablet.  Please take 1 tablet once daily.  2.  You have a new medication.  This is Lipitor/atorvastatin 40 mg tablet.  Please take 1 tablet by mouth once daily at bedtime.  3.  You have a new medication.  This is Plavix/clopidogrel 75 mg tablet.  Please take 1 tablet by mouth once daily.  4.  You have a new medication.  This is a Lasix/furosemide 20 mg tablet.  Please take 1 tablet by mouth once daily.  5.  You have a new medication.  This is Levaquin to 50 mg tablet.  Please take 1 tablet by mouth once daily for 2 days only.  6.  You have a new medication.  This is lisinopril 2.5 mg tablet.  Please take 1 tablet by mouth once daily.  7.  You have a new medication.  This is Atrovent/ipratropium 0.02% nebulizer solution.  Please take this by nebulization 2 times a day.  8.  You will be taking oxygen home with you.  This will be 2 L nasal cannula oxygen.  Please follow the instructions as given to you by the oxygen coordinator.  9.  Please continue taking your other home medications.  10.  Please follow-up with your cardiologist Dr. Krueger on 2/5/2024 at 3:15 PM.  11.  Please follow-up with your nephrologist Dr. Anguiano in 2 to 3 weeks.  12.  Please follow-up with Sukhjinder Gauthier urologist in 2 to 3 weeks for your outpatient cystoscopy.  13.  Please follow-up with your PCP Rosa Isela Bowen in 1 to 2 weeks.  14.  If symptoms recur or worsen, please return to your nearest ED immediately.      HEART FAILURE EDUCATION:  1. Weigh yourself daily and record on your weight log.  2. If you gain more than 2 or 3 pounds overnight, call your cardiologist.  3. Follow a low sodium diet. No more than 2000 mg in one day, or more than 650 mg per meal.  4. Limit total fluids to no more than 8 cups (or 2 liters) per day - this includes all fluids (water, coffee, juice, milk, tea, etc.)  5. Monitor your blood pressure daily and record on your  weight log.  6. Call to schedule your follow-up appointments when you get home if they were not already scheduled for you.  7. Keep your follow-up appointments! Bring your weight log with you so the doctors can see your weight trend and blood pressure readings.  8. Be sure to  any new prescriptions and take them as directed. If unsure of the medications, be sure to call your cardiologist.  9. Stay as active as you can tolerate.   10. If you notice subtle change of symptoms (slight increase in swelling, slight shortness of breath, a new intolerance to laying flat, a new cough), be sure to call your cardiologist.

## 2024-01-22 NOTE — NURSING NOTE
"  CHF Clinical Nurse Navigator Documentation    Congestive Heart Failure disease education was performed by the Clinical Nurse Navigator with a good understanding. Yes, needs reinforcement of teaching provided    CHF signs and symptoms discussed and when to call cardiologist?  Yes  Living With Heart Failure Education booklet?  No  Controlling Heart Failure at Home Education? Yes  CHF Education Teaching Tool? Yes  Home medication usage?  Yes  Nutrition Education? Yes, Low Sodium Diet  Daily Weight Education? Yes, Daily Weight Log given and reviewed  Cardiovascular Rehab Referral ordered?  No  Follow-up with Cardiologist after discharge education? Yes    Comments: Patient resting comfortably and is receiving 2L supplemental oxygen. Patient states she lives with her son, Mj, who is very supportive and assists patient with her daily needs. Patient does not have a cardiologist, but would like to become established with Dr. Krueger after discharge. Discussed following a low sodium diet and limiting sodium intake to 2,000mg per day to prevent fluid retention. Patient states, \"I buy food with low sodium because you can always add salt, but you can't take it out.\" Discussed alternatives to adding salt including no-salt seasoning options. Patient states, \"My son has a whole drawer full of spices he uses.\" Patient appreciated education.   "

## 2024-01-22 NOTE — PROGRESS NOTES
"Lupe Oshea is a 83 y.o. female on day 5 of admission presenting with AMS (altered mental status).    Subjective     Patient is sitting in a chair.  Patient does not appear to have chest pain or abdominal pain.  No fever or chills.  She has cough productive of minimal amount of phlegm.  Patient still has dyspnea on minimal exertion.       Objective     Physical Exam    Head and face no deformities  Oropharynx normal mucosa  Neck is supple no thyromegaly  Chest is symmetric with few rhonchi in the right lung base.  Heart is regular no murmurs  Abdomen is soft and nontender  Skin is intact  Joints are normal  Neurologically no gross motor or sensory deficit.  Last Recorded Vitals  Blood pressure 119/58, pulse 73, temperature 36.4 °C (97.5 °F), resp. rate 20, height 1.6 m (5' 2.99\"), weight 56.2 kg (123 lb 14.4 oz), SpO2 95 %.  Intake/Output last 3 Shifts:  I/O last 3 completed shifts:  In: 200 (3.6 mL/kg) [P.O.:100; IV Piggyback:100]  Out: - (0 mL/kg)   Weight: 56.2 kg                          Assessment/Plan      Hypoxia currently on 2 L/min oxygen nasal cannula.  Pneumonia with suspicion for possible aspiration.  Atelectasis.  Small bilateral pleural effusions and congestive heart failure.    Plan:  Finish 7-day course of antibiotics.  Wean off oxygen for saturation above 90%.  Aspiration precautions.  Ambulate as tolerated.  Maintain a slightly negative fluid balance.  DVT prophylaxis.      Dewey Lezama MD      " Stable.

## 2024-01-22 NOTE — PROGRESS NOTES
Physical Therapy                 Therapy Communication Note    Patient Name: Lupe Oshea  MRN: [id number]  Today's Date: 1/22/2024     Discipline: Physical Therapy    Missed Visit Reason: Missed Visit Reason:  (Attempted 9:38, pt with nursing.  Attempted again at 10:10, pt politely declining at this time.  Pt perseverating on possible discharge, does not want to do PT at this time despite education and encouragement.)    Missed Time: Attempt    Comment:

## 2024-01-22 NOTE — PROGRESS NOTES
Lupe Oshea is a 83 y.o. female on day 5 of admission presenting with AMS (altered mental status).      SUBJECTIVE     Patient evaluated this morning and found to be resting comfortably in bedside.  Patient states that she has been eating and drinking appropriately.  However, she did say that she experienced 4 episodes of soft and sometimes watery stools per day.    OBJECTIVE     Vitals:    01/22/24 0717 01/22/24 0850 01/22/24 1047 01/22/24 1245   BP:   119/55 119/58   Patient Position:       Pulse:  88 69 73   Resp:       Temp:   36.4 °C (97.5 °F) 36.4 °C (97.5 °F)   TempSrc:   Temporal    SpO2: 94% 93% 97% 95%   Weight:       Height:          Results from last 7 days   Lab Units 01/21/24  0752 01/18/24  0355 01/17/24  1509   WBC AUTO x10*3/uL 9.8   < > 8.7   HEMOGLOBIN g/dL 9.7*   < > 10.4*   HEMATOCRIT % 32.6*   < > 34.4*   PLATELETS AUTO x10*3/uL 235   < > 296   NEUTROS PCT AUTO %  --   --  76.3   LYMPHS PCT AUTO %  --   --  11.0   MONOS PCT AUTO %  --   --  12.0   EOS PCT AUTO %  --   --  0.0    < > = values in this interval not displayed.     Results from last 7 days   Lab Units 01/21/24  1537 01/20/24  0644 01/19/24  0713   SODIUM mmol/L 141   < > 144  144   POTASSIUM mmol/L 4.0   < > 4.6  4.6   CHLORIDE mmol/L 98   < > 102  102   CO2 mmol/L 37*   < > 33*  33*   BUN mg/dL 38*   < > 48*  48*   CREATININE mg/dL 1.60*   < > 1.37*  1.37*   CALCIUM mg/dL 9.0   < > 8.7  8.7   PROTEIN TOTAL g/dL  --   --  6.0*   BILIRUBIN TOTAL mg/dL  --   --  0.3   ALK PHOS U/L  --   --  72   ALT U/L  --   --  100*   AST U/L  --   --  39   GLUCOSE mg/dL 186*   < > 130*  130*    < > = values in this interval not displayed.       Scheduled Medications  [Held by provider] amLODIPine, 2.5 mg, oral, Daily  aspirin, 81 mg, oral, Daily  atorvastatin, 40 mg, oral, Nightly  budesonide, 0.5 mg, nebulization, BID  cefTRIAXone, 1 g, intravenous, q24h  clopidogrel, 75 mg, oral, Daily  [Held by provider] furosemide, 20 mg, oral,  Daily  insulin lispro, 0-5 Units, subcutaneous, TID  ipratropium-albuteroL, 3 mL, nebulization, TID  levothyroxine, 75 mcg, oral, Daily  lisinopril, 2.5 mg, oral, Daily  methylPREDNISolone sodium succinate (PF), 20 mg, intravenous, q12h  oxygen, , inhalation, Continuous - 02/gases  pantoprazole, 40 mg, oral, Daily before breakfast  polyethylene glycol, 17 g, oral, Daily       Physical Exam    Constitutional: Well developed, A&Ox3, no acute distress, alert and cooperative  Eyes: EOMI, clear sclera  Respiratory/Thorax: Bilateral expiratory wheezes with good chest expansion, oxygen supplementation via Venturi mask  Cardiovascular: RRR, no murmurs, 2+ equal pulses of the extremities, no JVD appreciated  Gastrointestinal: Nondistended, soft, non-tender  Extremities: normal extremities, no cyanosis edema, no clubbing  Psychological: Appropriate mood and behavior  Skin: Warm and dry    ASSESSMENT & PLAN     LAUREN, likely prerenal in setting of poor oral intake  Nephrotic range proteinuria  Hyperkalemia, resolved  Contraction alkalosis secondary to diuresis  Mildly reduced CHF (LVEF 40-45%)  Moderate aortic stenosis  NSTEMI  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  -Renal/Bladder US 1/18: 3.4 x 2.6 x 7.3 cm lesion at floor of bladder.  Right kidney 7.1 cm with mild cortical thinning and atrophy.  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  Left kidney 8.6 cm also with mild cortical thinning and atrophy  -C3 86, C4 40, SPEP/UPEP pending  PLAN:  -Will continue to hold diuresis given rising creatinine in setting of volume loss from diarrhea.  -Should be noted that BP should be monitored closely due to presence of aortic stenosis seen on last echo (patient is preload dependent).  -Avoid nephrotoxic agents and hypotension  -Maintain strict I/Os, daily  standing weights  -1500 mL fluid restriction  -Trend kidney function with morning labs  -On discharge: P.o. Lasix 20 mg daily, p.o. lisinopril 2.5 mg daily, follow-up with nephrology for further workup of proteinuria and newly diagnosed CKD most likely secondary to diabetic nephropathy    Carlito Arthur,   PGY-1, Internal Medicine  Please SecureChat for any further questions  This is a preliminary note, please await attending attestation for final A/P

## 2024-01-22 NOTE — PROGRESS NOTES
01/22/24 1004   Discharge Planning   Living Arrangements Children   Support Systems Children;Family members   Assistance Needed independent at baseline   Type of Residence Private residence   Number of Stairs to Enter Residence 2   Number of Stairs Within Residence 10   Patient expects to be discharged to: home w [facility]   Does the patient need discharge transport arranged? No   RoundTrip coordination needed? No     I met with patient at her bedside, verified insurance and demographics.  Patient lives with her son Mj.  She does not use mobility aides, denies falls, drives and is independent with ADLs.  We discussed her [facility] score (18/18) and PT/OT recommendations and patient is agreeable to home health care.  Mj's S.O. has a best friend who works for a home health agency.  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  This TCC to call Mj for more information in order to send referral.  Outgoing home care orders will be needed.  ADOD today; patient to discharge with home O2, nebulizer and meds per HCS liaison.  Deangelo WEST [facility]

## 2024-01-22 NOTE — NURSING NOTE
Home Oxygen Assessment:    Pulse Oximetry on Room Air at Rest: 86%  Pulse Oximetry on Oxygen at Rest:  94% on 2 lpm nc  Pulse Oximetry on Room Air during Ambulation: NA  Pulse Oximetry on Oxygen during Ambulation: 91%  Amount of Oxygen during Ambulation: 2 lpm nc    Does the patient qualify for home oxygen: yes  Order obtained for home oxygen: yes    What is the patient's chronic pulmonary diagnosis: COPD  Which DME company did the patient choose: [employer]  DME notified of home oxygen needs: yes    Comments: Patient is currently requiring 2 lpm nc continuous.

## 2024-01-22 NOTE — PROGRESS NOTES
Lupe Oshea is a 83 y.o. female on day 5 of admission presenting with AMS (altered mental status).      Subjective   Patient was seen at bedside today.  Acute events overnight.  Patient sitting on the side of bed.  On 1 to 2 L oxygen nasal cannula.  No acute respiratory distress.  Patient feels better.       Objective     Last Recorded Vitals  /58   Pulse 73   Temp 36.4 °C (97.5 °F)   Resp 20   Wt 56.2 kg (123 lb 14.4 oz)   SpO2 94%   Intake/Output last 3 Shifts:    Intake/Output Summary (Last 24 hours) at 1/22/2024 1420  Last data filed at 1/22/2024 0900  Gross per 24 hour   Intake 390 ml   Output --   Net 390 ml         Admission Weight  Weight: 56.2 kg (123 lb 14.4 oz) (01/17/24 1108)    Daily Weight  01/18/24 : 56.2 kg (123 lb 14.4 oz)    Image Results  Cardiac catheterization - coronary     [facility], Cath Lab, [address] [place] [address]    Cardiovascular Catheterization Report    Patient Name:      LUPE OSHEA Performing Physician:  [id number] Pablo MD  Study Date:        1/19/2024            Verifying Physician:   Debra Pablo MD  MRN/PID:           [id number]             Cardiologist/Co-scrub:  Accession#:        [id number]         Ordering Provider:     Debra PABLO  Date of Birth/Age: 1940 / 83 years  Fellow:  Gender:            F                    Fellow:  Encounter#:        [id number]       Study: Left Heart Cath       Indications:  LUPE OSHEA is a 84 year old female who presents with coronary artery disease, dyslipidemia, hypertension, chronic pulmonary disease and an anginal equivalent chest pain assessment (i.e. dyspnea on exertion believed to be from ischemia). NSTE - ACS.     Appropriate Use Criteria:  Non ST elevation  myocardial infarction with intermediate risk score; AUC score = 8.     Procedure Description:  After infiltration with 2% Lidocaine, the right radial artery was cannulated with a modified Seldinger technique. Subsequently a 6 Nigerien sheath was placed retrograde in the right radial artery. Selective coronary catheterization was performed using a 5 Fr catheter(s) exchanged over a guide wire to cannulate the coronary arteries.  Additional catheter(s) used to visualize the coronary arteries were: Tiger 4.0. Multiple injections of contrast were made into the left and right coronary arteries with angiograms recorded in multiple projections. After completion of the procedure, the arterial sheath was pulled and a TR Band Radial Compression Device was utilized to obtain patent hemostasis.     Coronary Angiography:  The coronary circulation is right dominant.     Coronary Angiography Comments:  Left main coronary artery contains mild luminal irregularities.    Left anterior descending artery contains a 70% stenosis proximally. There is a total occlusion in its midportion after the takeoff of the septal artery. There are left to left collaterals supplying the first diagonal artery. The right to left collaterals supplying the second diagonal artery.    Left circumflex artery contains a 30% stenosis ostially. The obtuse marginal artery contains mild luminal irregularities.    The ramus intermedius is a diminutive vessel with mild luminal irregularities.    The right coronary artery contains a 30% stenosis in its midportion.  Posterior descending artery contains an 80% stenosis in the mid PDA. Posterolateral branch contains mild luminal irregularities.         Left Ventriculography:  EDP 16.     Hemo Personnel:  +--------------------+---------+  Name                Duty       +--------------------+---------+  Ronni Muro MD 1  +--------------------+---------+       Hemodynamic Pressures:      +----+-------------------+---------+------------+-------------+------+---------+  Site     Date Time       Phase    Systolic    Diastolic    ED  Mean mmHg                           Name       mmHg        mmHg      mmHg            +----+-------------------+---------+------------+-------------+------+---------+    AO  1/19/2024 1:14:10  O2 REST         109           45             70                       PM                                                   +----+-------------------+---------+------------+-------------+------+---------+    LV  1/19/2024 1:23:43  O2 REST          94           10    16                                PM                                                   +----+-------------------+---------+------------+-------------+------+---------+    LV  1/19/2024 1:23:52  O2 REST          89            8    16                                PM                                                   +----+-------------------+---------+------------+-------------+------+---------+   LVp  1/19/2024 1:23:57  O2 REST          99           10    19                                PM                                                   +----+-------------------+---------+------------+-------------+------+---------+   AOp  1/19/2024 1:24:02  O2 REST          92           40             61                       PM                                                   +----+-------------------+---------+------------+-------------+------+---------+       Complications:  No in-lab complications observed.     Cardiac Cath Post Procedure Notes:  Post Procedure Diagnosis: CAD.  Blood Loss:               Estimated blood loss during the procedure was < 10                            mls.  Specimens Removed:        Number of specimen(s) removed: none.       Recommendations:  Maximize medical therapy.  Lipid lowering agent or Statin  therapy.  Further recommendations per Dr. Krueger.  Aspirin therapy.    ____________________________________________________________________________________  CONCLUSIONS:   1. Total occlusion of the mid LAD with collateralization to the first and second diagonal arteries.   2. Mild circumflex and RCA disease.   3. Severe branch vessel disease of the PDA.   4. Mildly elevated left heart filling pressures.    ICD 10 Codes:  Non ST elevation (NSTEMI) myocardial infarction-I21.4     CPT Codes:  Left Heart Cath (visualization of coronaries) and LV-[id number]; Moderate Sedation Services 1st additional 15 minutes patient >5 years-[id number]     [id number] Ronni Muro MD  Performing Physician  Electronically signed by [id number] Ronni Muro MD on 1/19/2024 at 1:40:40 PM         ** Final **      Physical Exam  General:  Pleasant and cooperative. No apparent distress.  HEENT:  Normocephalic, atraumatic, mucus membranes moist.   Neck:  Trachea midline.     Chest: no crackles or significant wheezing  CV:  Regular rate and rhythm.  Positive S1/S2.   Abdomen: Bowel sounds present in all four quadrants, abdomen is soft, non-tender, non-distended.  Extremities:  no lower extremity edema or cyanosis.   Neurological:  AAOx3. No focal deficits.  Skin:  Warm and dry.  Relevant Results  Transthoracic Echo (TTE) Complete    Result Date: 1/18/2024    [facility], [address] [place] [id number] [phone] and                                 Fax [phone] TRANSTHORACIC ECHOCARDIOGRAM REPORT  Patient Name:      ALPHONSO Sierra Physician:    [id number] Faisal Krueger MD Study Date:        1/18/2024            Ordering Provider:    [id number] DALI JEFFERY MRN/PID:           [id number]             Fellow: Accession#:        [id number]         Nurse: Date of Birth/Age: 1940 / 83 years  Sonographer:           Louise Peres                                                               [facility] Gender:            F                    Additional Staff: Height:            160.02 cm            Admit Date:           1/17/2024 Weight:            56.25 kg             Admission Status:     Observation -                                                               Priority discharge BSA:               1.58 m2              Encounter#:           [id number]                                         Department Location:  [facility] Blood Pressure: 101 /50 mmHg Study Type:    TRANSTHORACIC ECHO (TTE) COMPLETE Diagnosis/ICD: Non ST elevation (NSTEMI) myocardial infarction-I21.4 Indication:    Hypoxic CPT Code:      Echo Complete w Full Doppler-[id number] Patient History: Smoker: Current. Study Detail: The following Echo studies were performed: 2D, M-Mode, Doppler and               color flow.  PHYSICIAN INTERPRETATION: Left Ventricle: The left ventricular systolic function is mildly decreased, with an estimated ejection fraction of 40-45%. Wall motion is abnormal. The left ventricular cavity size is normal. Spectral Doppler shows a pseudonormal pattern of left ventricular diastolic filling. LV Wall Scoring: The entire anterior septum and apex are hypokinetic. Left Atrium: The left atrium is normal in size. Right Ventricle: The right ventricle is upper limits of normal in size. There is normal right ventricular global systolic function. Right Atrium: The right atrium is moderately dilated. Aortic Valve: The aortic valve is trileaflet. There is mild to moderate aortic valve cusp calcification. There is mild aortic valve thickening. There is evidence of mildly elevated transaortic gradients consistent with sclerosis of the aortic valve. There is no evidence of aortic valve regurgitation. The peak instantaneous gradient of the aortic valve is 9.7 mmHg. Mitral Valve: The mitral valve is mildly thickened. There is mild to moderate  mitral valve regurgitation. Tricuspid Valve: The tricuspid valve is structurally normal. There is trace tricuspid regurgitation. The Doppler estimated RVSP is moderately elevated at 50.5 mmHg. Pulmonic Valve: The pulmonic valve is structurally normal. There is mild pulmonic valve regurgitation. Pericardium: There is a trivial pericardial effusion. Pleural: There is a small bilateral pleural effusion. Aorta: The aortic root is normal. Systemic Veins: The inferior vena cava appears dilated. There is less than 50% IVC collapse with inspiration. In comparison to the previous echocardiogram(s): There are no prior studies on this patient for comparison purposes.  CONCLUSIONS:  1. Left ventricular systolic function is mildly decreased with a 40-45% estimated ejection fraction.  2. Entire anterior septum and apex are abnormal.  3. Spectral Doppler shows a pseudonormal pattern of left ventricular diastolic filling.  4. The right atrium is moderately dilated.  5. Mild to moderate mitral valve regurgitation.  6. Moderately elevated right ventricular systolic pressure.  7. Aortic valve sclerosis. QUANTITATIVE DATA SUMMARY: 2D MEASUREMENTS:                          Normal Ranges: Ao Root d:     2.80 cm   (2.0-3.7cm) LAs:           4.00 cm   (2.7-4.0cm) IVSd:          0.96 cm   (0.6-1.1cm) LVPWd:         0.76 cm   (0.6-1.1cm) LVIDd:         4.54 cm   (3.9-5.9cm) LVIDs:         2.77 cm LV Mass Index: 80.6 g/m2 LV % FS        39.0 % LA VOLUME:                               Normal Ranges: LA Vol A4C:        40.8 ml    (22+/-6mL/m2) LA Vol A2C:        42.5 ml LA Vol BP:         43.4 ml LA Vol Index A4C:  25.9ml/m2 LA Vol Index A2C:  27.0 ml/m2 LA Vol Index BP:   27.5 ml/m2 LA Area A4C:       15.8 cm2 LA Area A2C:       15.5 cm2 LA Major Axis A4C: 5.2 cm LA Major Axis A2C: 4.8 cm LA Volume Index:   26.2 ml/m2 LA Vol A4C:        38.6 ml LA Vol A2C:        42.5 ml RA VOLUME BY A/L METHOD:                       Normal Ranges: RA Area  A4C: 17.4 cm2 M-MODE MEASUREMENTS:                  Normal Ranges: Ao Root: 3.20 cm (2.0-3.7cm) LAs:     4.10 cm (2.7-4.0cm) AORTA MEASUREMENTS:                    Normal Ranges: Asc Ao, d: 2.90 cm (2.1-3.4cm) LV SYSTOLIC FUNCTION BY 2D PLANIMETRY (MOD):                     Normal Ranges: EF-A4C View: 61.2 % (>=55%) EF-A2C View: 58.1 % EF-Biplane:  59.4 % LV DIASTOLIC FUNCTION:                               Normal Ranges: MV Peak A:        0.91 m/s    (0.42-0.7 m/s) MV lateral e'     0.07 m/s MV medial e'      0.05 m/s MV A Dur:         137.00 msec PulmV Sys Ho:    76.90 cm/s PulmV Puckett Ho:   46.70 cm/s PulmV S/D Ho:    1.60 PulmV A Revs Ho: 25.60 cm/s PulmV A Revs Dur: 106.00 msec MITRAL VALVE:                 Normal Ranges: MV DT: 198 msec (150-240msec) AORTIC VALVE:                         Normal Ranges: AoV Vmax:      1.56 m/s (<=1.7m/s) AoV Peak P.7 mmHg (<20mmHg) LVOT Max Ho:  0.64 m/s (<=1.1m/s) LVOT VTI:      14.10 cm LVOT Diameter: 1.80 cm  (1.8-2.4cm) AoV Area,Vmax: 1.04 cm2 (2.5-4.5cm2)  RIGHT VENTRICLE: RV Basal 3.81 cm RV Mid   1.97 cm RV Major 7.7 cm TAPSE:   23.7 mm RV s'    0.11 m/s TRICUSPID VALVE/RVSP:                             Normal Ranges: Peak TR Velocity: 2.98 m/s RV Syst Pressure: 50.5 mmHg (< 30mmHg) IVC Diam:         2.00 cm Pulmonary Veins: PulmV A Revs Dur: 106.00 msec PulmV A Revs Ho: 25.60 cm/s PulmV Puckett Ho:   46.70 cm/s PulmV S/D Ho:    1.60 PulmV Sys Ho:    76.90 cm/s  [id number] Faisal Krueger MD Electronically signed on 2024 at 12:16:27 PM  Wall Scoring  ** Final **     NM Lung Perfusion    Result Date: 2024  Interpreted By:  Cynthia Islas and Maltbie Grace STUDY: NM LUNG PERFUSION;  2024 8:54 am   INDICATION: Signs/Symptoms:SOB, hypoxia, elevated d-dimer.   COMPARISON: CT chest 2024   ACCESSION NUMBER(S): [id number]   ORDERING CLINICIAN: PING POTTS   TECHNIQUE: DIVISION OF NUCLEAR MEDICINE PERFUSION LUNG SCANS   Multiple perfusion images of the  lungs were acquired after the intravenous administration of 4.1 mCi of Tc-99m macroaggregated albumin (MAA). In addition, SPECT of the chest was performed.   FINDINGS: Perfusion images of both lungs demonstrate mild heterogeneity throughout the lung fields bilaterally. No distinct wedge-shaped subsegmental or segmental perfusion defect seen on SPECT to suggest acute pulmonary embolism (low probability).       1. No distinct wedge-shaped subsegmental or segmental perfusion defect seen on SPECT to suggest acute pulmonary embolism (low probability).   The interpretation above is based on modified PIOPED II and PISAPED criteria.   I personally reviewed the images/study and I agree with the findings as stated by Nuclear Medicine fellow Sol Gil MD. This study was interpreted at [place], [place].   Signed by: Cynthia Islas 1/18/2024 10:17 AM Dictation workstation:   [id number]    CT chest wo IV contrast    Result Date: 1/18/2024  Interpreted By:  Finkelstein, Evan, STUDY: CT CHEST WO IV CONTRAST;  1/18/2024 12:59 am   INDICATION: Signs/Symptoms:New hypoxia, concern for pneumonia.   COMPARISON: Chest radiograph 01/17/2024   ACCESSION NUMBER(S): [id number]   ORDERING CLINICIAN: PING POTTS   TECHNIQUE: Axial CT images of the chest obtained  without IV contrast.  Sagittal and coronal reconstructions were created..   FINDINGS: CHEST WALL AND LOWER NECK: Within normal limits. UPPER ABDOMEN: There are vascular calcifications involving the renal vessels bilaterally along with suspected nonobstructing stones. Simple appearing cysts in the right kidney. Right suprarenal hypodensity measuring approximately 1.7 cm favored to represent an adrenal adenoma.   VESSELS: Enlarged main pulmonary artery measures up to 3.3 cm. Atherosclerotic calcifications in the aorta and coronary arteries. HEART: Cardiomegaly. No pericardial effusion. MEDIASTINUM AND HERMAN: 1 cm subcarinal lymph node.  Prominent lymph nodes scattered elsewhere throughout the mediastinum as well. LUNG, PLEURA, AND LARGE AIRWAYS: Moderate left and small right pleural effusions. Thickened interlobular septal markings. Patchy left basilar airspace opacity. Dependent atelectasis. Moderate emphysematous changes scattered throughout the lungs bilaterally. Ground-glass opacities most pronounced along the periphery of the lungs. 3 mm pleural-based nodule along the periphery of the right middle lobe, best seen image 162 of series 202. 1.1 cm nodule in the lingula image 135.   BONES: No acute osseous abnormality.       Patchy left basilar airspace opacity concerning for pneumonia. Additional dependent opacities are favored to represent superimposed atelectasis.   Ground-glass opacities most pronounced along the periphery of the lungs which may be infectious or inflammatory in etiology.   Moderate left and small right pleural effusions.   Cardiomegaly with thickened interlobular septal markings suggesting pulmonary interstitial edema.   Enlarged main pulmonary artery measures up to 3.3 cm and may be seen with pulmonary arterial hypertension.   Prominent nonspecific mediastinal lymph nodes, possibly reactive.   Scattered pulmonary nodules, largest measuring up to 1.1 cm in the lingula. Recommend follow-up imaging with CT at 3-6 months as per Fleischner criteria.   MACRO: None.   Signed by: Evan Finkelstein 1/18/2024 1:30 AM Dictation workstation:   [id number]    CT head wo IV contrast    Result Date: 1/18/2024  Interpreted By:  Finkelstein, Evan, STUDY: CT HEAD WO IV CONTRAST;  1/18/2024 12:59 am   INDICATION: Signs/Symptoms:Altered Mental Status.   COMPARISON: None.   ACCESSION NUMBER(S): [id number]   ORDERING CLINICIAN: PING POTTS   TECHNIQUE: Axial noncontrast CT images of the head with coronal and sagittal reconstructions.   FINDINGS: EXTRACRANIAL SOFT TISSUES: Unremarkable.   CALVARIUM: No depressed skull fracture. No destructive  osseous lesion.   PARANASAL SINUSES/MASTOIDS: The visualized paranasal sinuses and mastoid air cells are aerated.   HEMORRHAGE: No acute intracranial hemorrhage.   BRAIN PARENCHYMA: Gray-white matter interfaces are preserved. No mass effect or midline shift. There are nonspecific scattered white matter hypodensities.   VENTRICLES and EXTRA-AXIAL SPACES: Parenchymal atrophy with prominence of the ventricles and cortical sulci.   OTHER FINDINGS: There are calcifications within the cavernous carotids       No acute intracranial hemorrhage, mass effect or midline shift.   Nonspecific scattered white matter hypodensities favored to represent sequela of small vessel ischemia.     MACRO: None.   Signed by: Evan Finkelstein 1/18/2024 1:21 AM Dictation workstation:   [id number]    Lower extremity venous duplex bilateral    Result Date: 1/18/2024  Interpreted By:  Jerry Prieto, STUDY: [facility] LOWER EXTREMITY VENOUS DUPLEX BILATERAL;  1/18/2024 12:11 am   INDICATION: Signs/Symptoms:Concerns for PE and DVTs.   COMPARISON: None.   ACCESSION NUMBER(S): [id number]   ORDERING CLINICIAN: PIGN POTTS   TECHNIQUE: Grayscale, color and spectral Doppler sonographic images of the bilateral lower extremity deep venous system.   FINDINGS: RIGHT: There is normal compressibility of the common femoral vein, saphenous femoral junction, profunda femoral vein and popliteal vein. The posterior tibial and peroneal veins  demonstrate normal color flow and compressibility. There is normal spontaneous and phasic variation throughout the leg by spectral doppler.   LEFT: There is normal compressibility of the common femoral vein, saphenous femoral junction, profunda femoral vein and popliteal vein. The posterior tibial and peroneal veins  are suboptimally visualized. There is normal spontaneous and phasic variation throughout the leg by spectral doppler.   OTHER FINDINGS: None.       Suboptimal visualization of the left calf veins. No sonographic  evidence DVT in the visualized vessels of bilateral lower extremities.   MACRO: None   Signed by: Jerry Prieto 1/18/2024 12:32 AM Dictation workstation:   [id number]    XR chest 1 view    Result Date: 1/17/2024  Interpreted By:  Ronni Noel, STUDY: XR CHEST 1 VIEW;  1/17/2024 3:21 pm   INDICATION: Signs/Symptoms:fatigue/SOB.   COMPARISON: None.   ACCESSION NUMBER(S): [id number]   ORDERING CLINICIAN: SERAFIN RAO   FINDINGS: CARDIOMEDIASTINAL SILHOUETTE AND VASCULATURE:   Cardiac size:  Mild cardiomegaly   Aortic shadow:  Within normal limits.   Mediastinal contours: Within normal limits.   Pulmonary vasculature:  Mild cephalization of the pulmonary blood flow suggesting mild congestion.   LUNGS: There is mild interstitial prominence probably accentuated from a somewhat shallow inspiration, but may be due to mild or early congestion. Slight opacity left lower lung is probably from atelectasis and/or fibrosis, or possibly focal infiltrate. There is blunting at the costophrenic angle that may be from a small effusion. Follow-up as clinically warranted.   ABDOMEN AND OTHER FINDINGS: No remarkable upper abdominal findings.   BONES: No acute osseous changes.       1.  Fibrosis and/or mild or early congestion and left lower lung atelectasis or infiltrate.   Signed by: Ronni Noel 1/17/2024 3:33 PM Dictation workstation:   [id number]    Results for orders placed or performed during the hospital encounter of 01/17/24 (from the past 24 hour(s))   POCT GLUCOSE   Result Value Ref Range    POCT Glucose 174 (H) 74 - 99 mg/dL   Renal function panel   Result Value Ref Range    Glucose 186 (H) 74 - 99 mg/dL    Sodium 141 136 - 145 mmol/L    Potassium 4.0 3.5 - 5.3 mmol/L    Chloride 98 98 - 107 mmol/L    Bicarbonate 37 (H) 21 - 32 mmol/L    Anion Gap 10 10 - 20 mmol/L    Urea Nitrogen 38 (H) 6 - 23 mg/dL    Creatinine 1.60 (H) 0.50 - 1.05 mg/dL    eGFR 32 (L) >60 mL/min/1.73m*2    Calcium 9.0 8.6 - 10.3 mg/dL    Phosphorus  2.6 2.5 - 4.9 mg/dL    Albumin 3.6 3.4 - 5.0 g/dL   POCT GLUCOSE   Result Value Ref Range    POCT Glucose 168 (H) 74 - 99 mg/dL   POCT GLUCOSE   Result Value Ref Range    POCT Glucose 120 (H) 74 - 99 mg/dL   POCT GLUCOSE   Result Value Ref Range    POCT Glucose 211 (H) 74 - 99 mg/dL   Protein, Urine Random   Result Value Ref Range    Total Protein, Urine Random 21 5 - 24 mg/dL    Creatinine, Urine Random 5.6 (L) 20.0 - 320.0 mg/dL    T. Protein/Creatinine Ratio 3.75 (H) 0.00 - 0.17 mg/mg Creat    Scheduled medications  [Held by provider] amLODIPine, 2.5 mg, oral, Daily  aspirin, 81 mg, oral, Daily  atorvastatin, 40 mg, oral, Nightly  budesonide, 0.5 mg, nebulization, BID  cefTRIAXone, 1 g, intravenous, q24h  clopidogrel, 75 mg, oral, Daily  [Held by provider] furosemide, 20 mg, oral, Daily  insulin lispro, 0-5 Units, subcutaneous, TID  ipratropium-albuteroL, 3 mL, nebulization, TID  levothyroxine, 75 mcg, oral, Daily  lisinopril, 2.5 mg, oral, Daily  methylPREDNISolone sodium succinate (PF), 20 mg, intravenous, q12h  oxygen, , inhalation, Continuous - 02/gases  pantoprazole, 40 mg, oral, Daily before breakfast  polyethylene glycol, 17 g, oral, Daily      Continuous medications       PRN medications  PRN medications: acetaminophen **OR** acetaminophen, dextrose 10 % in water (D10W), dextrose, glucagon, ipratropium-albuteroL, oxygen        Assessment/Plan    Susannah Oshea is a 83-year-old female with past medical history of HTN, HLD, hypothyroid, CKD 3, type II DM, presented to hospital with 1 week history of confusion and a mechanical fall.  She is being managed to medical service for evaluation management of acute hypoxic respiratory failure and other multiple medical issues.    Progress:  1/20: Wean oxygen down today to maintain 88 to 94% oxygen saturation.  Patient is on 5.5 L oxygen nasal cannula today.  Cardiology following.  Day 3 of antibiotics  1/21: Day 4/5 of antibiotics.  Added lisinopril 2.5 daily and  transitioned to 20 mg p.o. Lasix daily, per cardiology recs.  Failed ambulatory pulse ox, will need home oxygen.  Plan for discharge tomorrow.  1/22: Day 5/7 Rocephin.  Creatinine has worsened slightly.  Hold Lasix.  Patient on 2 L oxygen.  Has qualified for home oxygen 2 L nasal cannula, nebs and DuoNebs.  Will discharge patient on Lasix 20 mg p.o. once daily.  Continue medical management for NSTEMI with aspirin, Plavix, and high intensity statin.  Continue lisinopril 2.5 mg p.o. once daily.    # Acute hypoxic respiratory failure  # Left lower lobe pneumonia  # Left lower lobe atelectasis  # Acute exacerbation of COPD  # Scattered pulmonary nodules  - We will continue treatment of pneumonia with Rocephin day 5 out of 7   - Pulmonology is following.    - We will continue with IV Solu-Medrol 20 mg twice daily along with respiratory bronchopulmonary hygiene along with DuoNebs, Pulmicort as advised by pulm.     # NSTEMI  # Elevated troponin  # HFrEF  # Moderate aortic stenosis  - Patient did not complain of any central chest pain, however, troponins were elevated at 556 to 769 to 663.  Cardiology is following.  They advised to continue diuresis with PO 20 mg Lasix.  Continue with aspirin. Cardiology continues to follow.  Will continue with judicious diuresis.  - Echo showed 40 to 45% ejection fraction and moderate aortic stenosis  - Because patient has moderate aortic stenosis, it is extremely important to ensure patient does not become hypotensive.  She is preload dependent and if she is diuresed too much she can become hypotensive.  We will monitor blood pressures very closely.    # LAUREN, likely prerenal--resolved  # Hyperkalemia-resolved  - Nephrology following: Want to determine extent of diabetic nephropathy with appropriate tests including urine albumin, protein, creatinine.  Avoid nephrotoxic agents.  Will maintain strict MAGY's and will fluid restrict 1.5 L.  Continue to monitor BMP.    # Hematuria  #bladder mass  concerning for malignancy  # Acute anemia, suspect 2/2 above  - Has had a 2-year chronic history of hematuria.  Ultrasound renal showed large bladder mass but no hydronephrosis.  Urology consult advised to plan for outpatient cystoscopy B/L RGP TURBT as outpatient.  No acute surgical invention planned for this admission.    # Transaminitis  - This is likely secondary to acute infective process and possibly heart failure.  LFTs are downtrending.  On high intensity statin.    # HTN  # Hypothyroid  # Type II DM  - We will hold amlodipine for now.  Will continue Synthroid.  Continue with lispro sliding scale to ensure blood sugars are well-controlled.    - DVT PPx: Heparin subcu    Clarisa Varma MD  PGY-1 Internal Medicine

## 2024-01-22 NOTE — PROGRESS NOTES
Subjective Data:  Stable night.  Patient remains on supplemental oxygen.  She will receive home-going oxygen at discharge.  Vital signs remained stable.  Resting heart rate 61.     Objective Data:  Last Recorded Vitals:  Vitals:    01/21/24 1910 01/22/24 0500 01/22/24 0712 01/22/24 0717   BP: 131/61 100/54     Patient Position: Sitting      Pulse: 88 61     Resp: 20 20     Temp: 36.3 °C (97.3 °F) 36.5 °C (97.7 °F)     TempSrc: Temporal      SpO2: 94% 97% 92% 94%   Weight:       Height:           Last Labs:  CBC - 1/21/2024:  7:52 AM  9.8 9.7 235    32.6      CMP - 1/21/2024:  3:37 PM  9.0 6.0 39 --- 0.3   2.6 3.6 100 72      PTT - No results in last year.  _   _ _     TROPHS   Date/Time Value Ref Range Status   01/17/2024 10:03  0 - 13 ng/L Final     Comment:     Previous result verified on 1/17/2024 1553 on specimen/case [id number] called with component TRPHS for procedure Troponin I, High Sensitivity with value 556 ng/L.   01/17/2024 05:08  0 - 13 ng/L Final     Comment:     Previous result verified on 1/17/2024 1553 on specimen/case [id number] called with component TRPHS for procedure Troponin I, High Sensitivity with value 556 ng/L.   01/17/2024 03:09  0 - 13 ng/L Final     BNP   Date/Time Value Ref Range Status   01/17/2024 03:09 PM 2,731 0 - 99 pg/mL Final     HGBA1C   Date/Time Value Ref Range Status   09/13/2023 11:13 AM 6.0 % Final     Comment:          Diagnosis of Diabetes-Adults   Non-Diabetic: < or = 5.6%   Increased risk for developing diabetes: 5.7-6.4%   Diagnostic of diabetes: > or = 6.5%  .       Monitoring of Diabetes                Age (y)     Therapeutic Goal (%)   Adults:          >18           <7.0   Pediatrics:    13-18           <7.5                   7-12           <8.0                   0- 6            7.5-8.5   American Diabetes Association. Diabetes Care 33(S1), Jan 2010.     10/13/2022 12:26 PM 5.4 % Final     Comment:          Diagnosis of Diabetes-Adults    Non-Diabetic: < or = 5.6%   Increased risk for developing diabetes: 5.7-6.4%   Diagnostic of diabetes: > or = 6.5%  .       Monitoring of Diabetes                Age (y)     Therapeutic Goal (%)   Adults:          >18           <7.0   Pediatrics:    13-18           <7.5                   7-12           <8.0                   0- 6            7.5-8.5   American Diabetes Association. Diabetes Care 33(S1), Jan 2010.     10/19/2020 04:43 PM 6.5 4.2 - 6.5 % Final   07/06/2020 11:00 AM 6.4 4.2 - 6.5 % Final     LDLCALC   Date/Time Value Ref Range Status   01/17/2024 10:03 PM 67 <=99 mg/dL Final     Comment:                                 Near   Borderline      AGE      Desirable  Optimal    High     High     Very High     0-19 Y     0 - 109     ---    110-129   >/= 130     ----    20-24 Y     0 - 119     ---    120-159   >/= 160     ----      >24 Y     0 -  99   100-129  130-159   160-189     >/=190       VLDL   Date/Time Value Ref Range Status   01/17/2024 10:03 PM 8 0 - 40 mg/dL Final   09/13/2023 11:13 AM 12 0 - 40 mg/dL Final   10/13/2022 12:26 PM 9 0 - 40 mg/dL Final   07/07/2022 11:24 AM 12 0 - 40 mg/dL Final      Last I/O:  I/O last 3 completed shifts:  In: 200 (3.6 mL/kg) [P.O.:100; IV Piggyback:100]  Out: - (0 mL/kg)   Weight: 56.2 kg     Past Cardiology Tests (Last 3 Years):  EKG:  No results found for this or any previous visit from the past 1095 days.    Echo:  Transthoracic Echo (TTE) Complete 01/18/2024    Ejection Fractions:  EF   Date/Time Value Ref Range Status   01/18/2024 10:15 AM 59       Cath:  Cardiac catheterization - coronary 01/19/2024    Stress Test:  No results found for this or any previous visit from the past 1095 days.    Cardiac Imaging:  No results found for this or any previous visit from the past 1095 days.      Inpatient Medications:  Scheduled medications   Medication Dose Route Frequency    [Held by provider] amLODIPine  2.5 mg oral Daily    aspirin  81 mg oral Daily    atorvastatin   40 mg oral Nightly    budesonide  0.5 mg nebulization BID    cefTRIAXone  1 g intravenous q24h    clopidogrel  75 mg oral Daily    furosemide  20 mg oral Daily    insulin lispro  0-5 Units subcutaneous TID    ipratropium-albuteroL  3 mL nebulization TID    levothyroxine  75 mcg oral Daily    lisinopril  2.5 mg oral Daily    methylPREDNISolone sodium succinate (PF)  20 mg intravenous q12h    oxygen   inhalation Continuous - 02/gases    pantoprazole  40 mg oral Daily before breakfast    polyethylene glycol  17 g oral Daily     PRN medications   Medication    acetaminophen    Or    acetaminophen    dextrose 10 % in water (D10W)    dextrose    glucagon    ipratropium-albuteroL    oxygen     Continuous Medications   Medication Dose Last Rate         Physical Exam:  General: No acute distress,  A&O x3.  Frail  Skin: Warm and dry  Neck: JVD is not elevated  ENT: Moist mucous membranes no lesions appreciated  Pulmonary: Mildly diminished bilaterally particular at the lung bases  Cards: Regular rate rhythm, no murmurs gallops or rubs appreciated normal S1-S2  Abdomen: Soft nontender nondistended  Extremities: No edema or cyanosis  Psych: Appropriate mood and affect      A/P     1.  NSTEMI: Initial symptoms of fatigue and weakness.  Echo showed new wall motion abnormality in the LAD territory with elevated troponins.  Left heart cath revealed  mid LAD moderate disease in the remaining coronary vessels.  Decision was made for medical management for now.  Continue aspirin, Plavix and high intensity statin therapy.       2.  Heart failure reduced ejection fraction: EF of 40 to 45%: Presented with acute on chronic exacerbation, bilateral pleural effusions, transaminitis felt to be related to congestion and volume overload.  Modest improvement with IV diuresis although limited due to low blood pressures.      DC patient on oral Lasix 20 mg daily.    Continue lisinopril 2.5 mg daily for neurohormonal blockade.    Will hold off  on initiation of beta-blockers due to lower resting heart rate and reassess as an outpatient.    Patient remains on supplemental oxygen which she will likely need moving forward.    Outpatient follow-up for continual optimization.    (This note was generated with voice recognition software and may contain errors including spelling, grammar, syntax and missed recognition of what was dictated, of which may not have been fully corrected)     Code Status:  Full Code    Faisal Krueger MD PhD

## 2024-01-22 NOTE — NURSING NOTE
Pulmonary Disease Navigator Documentation:    Pulmonary disease education was performed by the Respiratory Therapist with a good understanding: yes  Home oxygen: none on admission; patient to be discharged on 2 lpm nc cont through [facility] (see separate documentation)  Home medication usage education done?  Patient had no respiratory medications prior to admission.  Being discharged with order for home nebulizer machine and Duoneb.    Comments: Patient denies having respiratory related symptoms at home prior to this admission.  Patient stated she will follow with her PCP for any outpatient respiratory needs.    This respiratory therapist met with patient to discuss smoking cessation and review educational pamphlet provided. Patient educated on nicotine addiction, dangers associated with smoking, and risk factors associated with exposure to second hand smoke. Discussed patient's behavioral triggers for smoking, behavioral interventions for successful smoking cessation, and support group availability in the area. Patient has been advised that \[facility]\"" are smoke-free facilities.

## 2024-01-23 VITALS
TEMPERATURE: 98.1 F | OXYGEN SATURATION: 92 % | WEIGHT: 123.9 LBS | HEIGHT: 63 IN | RESPIRATION RATE: 20 BRPM | HEART RATE: 62 BPM | DIASTOLIC BLOOD PRESSURE: 77 MMHG | BODY MASS INDEX: 21.95 KG/M2 | SYSTOLIC BLOOD PRESSURE: 104 MMHG

## 2024-01-23 PROBLEM — I21.4 NSTEMI (NON-ST ELEVATED MYOCARDIAL INFARCTION) (MULTI): Status: RESOLVED | Noted: 2024-01-17 | Resolved: 2024-01-23

## 2024-01-23 PROBLEM — R41.82 AMS (ALTERED MENTAL STATUS): Status: RESOLVED | Noted: 2024-01-17 | Resolved: 2024-01-23

## 2024-01-23 LAB
ALBUMIN SERPL BCP-MCNC: 3.1 G/DL (ref 3.4–5)
ANION GAP SERPL CALC-SCNC: 7 MMOL/L (ref 10–20)
BUN SERPL-MCNC: 43 MG/DL (ref 6–23)
CALCIUM SERPL-MCNC: 8.3 MG/DL (ref 8.6–10.3)
CHLORIDE SERPL-SCNC: 100 MMOL/L (ref 98–107)
CO2 SERPL-SCNC: 39 MMOL/L (ref 21–32)
CREAT SERPL-MCNC: 1.19 MG/DL (ref 0.5–1.05)
EGFRCR SERPLBLD CKD-EPI 2021: 45 ML/MIN/1.73M*2
ERYTHROCYTE [DISTWIDTH] IN BLOOD BY AUTOMATED COUNT: 17.1 % (ref 11.5–14.5)
GLUCOSE BLD MANUAL STRIP-MCNC: 202 MG/DL (ref 74–99)
GLUCOSE BLD MANUAL STRIP-MCNC: 96 MG/DL (ref 74–99)
GLUCOSE SERPL-MCNC: 86 MG/DL (ref 74–99)
HCT VFR BLD AUTO: 33.4 % (ref 36–46)
HGB BLD-MCNC: 10.1 G/DL (ref 12–16)
MCH RBC QN AUTO: 28 PG (ref 26–34)
MCHC RBC AUTO-ENTMCNC: 30.2 G/DL (ref 32–36)
MCV RBC AUTO: 93 FL (ref 80–100)
NRBC BLD-RTO: 0 /100 WBCS (ref 0–0)
PHOSPHATE SERPL-MCNC: 3.7 MG/DL (ref 2.5–4.9)
PLATELET # BLD AUTO: 225 X10*3/UL (ref 150–450)
POTASSIUM SERPL-SCNC: 4 MMOL/L (ref 3.5–5.3)
RBC # BLD AUTO: 3.61 X10*6/UL (ref 4–5.2)
SODIUM SERPL-SCNC: 142 MMOL/L (ref 136–145)
WBC # BLD AUTO: 11.3 X10*3/UL (ref 4.4–11.3)

## 2024-01-23 PROCEDURE — 2500000001 HC RX 250 WO HCPCS SELF ADMINISTERED DRUGS (ALT 637 FOR MEDICARE OP): Performed by: INTERNAL MEDICINE

## 2024-01-23 PROCEDURE — 2500000001 HC RX 250 WO HCPCS SELF ADMINISTERED DRUGS (ALT 637 FOR MEDICARE OP)

## 2024-01-23 PROCEDURE — 2500000004 HC RX 250 GENERAL PHARMACY W/ HCPCS (ALT 636 FOR OP/ED): Performed by: INTERNAL MEDICINE

## 2024-01-23 PROCEDURE — 36415 COLL VENOUS BLD VENIPUNCTURE: CPT

## 2024-01-23 PROCEDURE — 94640 AIRWAY INHALATION TREATMENT: CPT | Mod: MUE

## 2024-01-23 PROCEDURE — 99239 HOSP IP/OBS DSCHRG MGMT >30: CPT

## 2024-01-23 PROCEDURE — 2500000002 HC RX 250 W HCPCS SELF ADMINISTERED DRUGS (ALT 637 FOR MEDICARE OP, ALT 636 FOR OP/ED): Performed by: INTERNAL MEDICINE

## 2024-01-23 PROCEDURE — 82947 ASSAY GLUCOSE BLOOD QUANT: CPT

## 2024-01-23 PROCEDURE — 80069 RENAL FUNCTION PANEL: CPT

## 2024-01-23 PROCEDURE — 85027 COMPLETE CBC AUTOMATED: CPT

## 2024-01-23 RX ORDER — CLOPIDOGREL BISULFATE 75 MG/1
75 TABLET ORAL DAILY
Qty: 30 TABLET | Refills: 0 | Status: SHIPPED | OUTPATIENT
Start: 2024-01-24 | End: 2024-02-07 | Stop reason: SDUPTHER

## 2024-01-23 RX ORDER — ATORVASTATIN CALCIUM 40 MG/1
40 TABLET, FILM COATED ORAL NIGHTLY
Qty: 30 TABLET | Refills: 0 | Status: SHIPPED | OUTPATIENT
Start: 2024-01-23 | End: 2024-02-07 | Stop reason: SDUPTHER

## 2024-01-23 RX ORDER — LEVOFLOXACIN 500 MG/1
250 TABLET, FILM COATED ORAL
Status: DISCONTINUED | OUTPATIENT
Start: 2024-01-23 | End: 2024-01-23

## 2024-01-23 RX ORDER — CLOPIDOGREL BISULFATE 75 MG/1
75 TABLET ORAL DAILY
Qty: 90 TABLET | Refills: 3 | Status: SHIPPED | OUTPATIENT
Start: 2024-01-24 | End: 2024-01-23 | Stop reason: SDUPTHER

## 2024-01-23 RX ORDER — FUROSEMIDE 20 MG/1
20 TABLET ORAL DAILY PRN
Qty: 30 TABLET | Refills: 0 | Status: SHIPPED | OUTPATIENT
Start: 2024-01-23 | End: 2024-02-07 | Stop reason: SDUPTHER

## 2024-01-23 RX ORDER — FUROSEMIDE 20 MG/1
20 TABLET ORAL DAILY
Qty: 30 TABLET | Refills: 0 | Status: SHIPPED | OUTPATIENT
Start: 2024-01-23 | End: 2024-01-23 | Stop reason: SDUPTHER

## 2024-01-23 RX ORDER — NAPROXEN SODIUM 220 MG/1
81 TABLET, FILM COATED ORAL DAILY
Qty: 30 TABLET | Refills: 0 | Status: SHIPPED | OUTPATIENT
Start: 2024-01-24 | End: 2024-05-22 | Stop reason: ALTCHOICE

## 2024-01-23 RX ORDER — LEVOFLOXACIN 250 MG/1
250 TABLET ORAL
Qty: 2 TABLET | Refills: 0 | Status: SHIPPED | OUTPATIENT
Start: 2024-01-23 | End: 2024-01-25

## 2024-01-23 RX ORDER — LISINOPRIL 2.5 MG/1
2.5 TABLET ORAL DAILY
Qty: 30 TABLET | Refills: 0 | Status: SHIPPED | OUTPATIENT
Start: 2024-01-24 | End: 2024-02-07 | Stop reason: SDUPTHER

## 2024-01-23 RX ORDER — IPRATROPIUM BROMIDE 0.5 MG/2.5ML
0.5 SOLUTION RESPIRATORY (INHALATION) 2 TIMES DAILY
Qty: 125 ML | Refills: 0 | Status: SHIPPED | OUTPATIENT
Start: 2024-01-23 | End: 2024-02-12 | Stop reason: SDUPTHER

## 2024-01-23 RX ADMIN — METHYLPREDNISOLONE SODIUM SUCCINATE 20 MG: 40 INJECTION, POWDER, FOR SOLUTION INTRAMUSCULAR; INTRAVENOUS at 06:03

## 2024-01-23 RX ADMIN — ASPIRIN 81 MG: 81 TABLET, CHEWABLE ORAL at 08:13

## 2024-01-23 RX ADMIN — POLYETHYLENE GLYCOL 3350 17 G: 17 POWDER, FOR SOLUTION ORAL at 08:12

## 2024-01-23 RX ADMIN — IPRATROPIUM BROMIDE AND ALBUTEROL SULFATE 3 ML: .5; 3 SOLUTION RESPIRATORY (INHALATION) at 13:55

## 2024-01-23 RX ADMIN — BUDESONIDE INHALATION 0.5 MG: 0.5 SUSPENSION RESPIRATORY (INHALATION) at 07:34

## 2024-01-23 RX ADMIN — CLOPIDOGREL 75 MG: 75 TABLET ORAL at 08:12

## 2024-01-23 RX ADMIN — LISINOPRIL 2.5 MG: 5 TABLET ORAL at 08:13

## 2024-01-23 RX ADMIN — LEVOTHYROXINE SODIUM 75 MCG: 0.07 TABLET ORAL at 08:13

## 2024-01-23 RX ADMIN — PANTOPRAZOLE SODIUM 40 MG: 40 TABLET, DELAYED RELEASE ORAL at 06:03

## 2024-01-23 RX ADMIN — IPRATROPIUM BROMIDE AND ALBUTEROL SULFATE 3 ML: .5; 3 SOLUTION RESPIRATORY (INHALATION) at 07:34

## 2024-01-23 RX ADMIN — INSULIN LISPRO 2 UNITS: 100 INJECTION, SOLUTION INTRAVENOUS; SUBCUTANEOUS at 14:21

## 2024-01-23 ASSESSMENT — PAIN SCALES - GENERAL: PAINLEVEL_OUTOF10: 0 - NO PAIN

## 2024-01-23 ASSESSMENT — PAIN - FUNCTIONAL ASSESSMENT: PAIN_FUNCTIONAL_ASSESSMENT: 0-10

## 2024-01-23 NOTE — PROGRESS NOTES
"Occupational Therapy                 Therapy Communication Note    Patient Name: Lupe Oshea  MRN: [id number]  Today's Date: 1/23/2024     Discipline: Occupational Therapy    Missed Visit Reason: Missed Visit Reason: Other (Comment)  Received OT order for \"cognitive evaluation/safety assessment\";  per ordering physician (Dr Varma), cognitive evaluation deferred at this time secondary to planned discharge this date. Completed order.    "

## 2024-01-23 NOTE — PROGRESS NOTES
NEPHROLOGY PROGRESS NOTE    REASON FOR CONSULT: LAUREN on CKD stage III    SUBJECTIVE:  Patient is sitting by the side of the bed reading the newspaper.  She would like to go home.  Home health care needs to be arranged.  She will need oxygen at home.  Her breathing is stable.  No chest pain.  Diuretics were held yesterday.      OBJECTIVE:    Visit Vitals  /55   Pulse 60   Temp 36.2 °C (97.2 °F)   Resp 20          Intake/Output Summary (Last 24 hours) at 1/23/2024 0955  Last data filed at 1/23/2024 0900  Gross per 24 hour   Intake 340 ml   Output --   Net 340 ml        General: Awake and Alert, In no distress, Cooperative  HEENT: Oral mucosa moist, EOMI  NECK: Supple  CHEST: No crackles, no wheeze, no tachypnea  CVS: S1,S2 heard, no rubs, RRR  ABD: Soft, Non Tender, BS present  EXT: no edema    MEDICATION:    Scheduled medications  [Held by provider] amLODIPine, 2.5 mg, oral, Daily  aspirin, 81 mg, oral, Daily  atorvastatin, 40 mg, oral, Nightly  budesonide, 0.5 mg, nebulization, BID  cefTRIAXone, 1 g, intravenous, q24h  clopidogrel, 75 mg, oral, Daily  [Held by provider] furosemide, 20 mg, oral, Daily  insulin lispro, 0-5 Units, subcutaneous, TID  ipratropium-albuteroL, 3 mL, nebulization, TID  levothyroxine, 75 mcg, oral, Daily  lisinopril, 2.5 mg, oral, Daily  methylPREDNISolone sodium succinate (PF), 20 mg, intravenous, q12h  oxygen, , inhalation, Continuous - 02/gases  pantoprazole, 40 mg, oral, Daily before breakfast  polyethylene glycol, 17 g, oral, Daily      Continuous medications     PRN medications  PRN medications: acetaminophen **OR** acetaminophen, dextrose 10 % in water (D10W), dextrose, glucagon, ipratropium-albuteroL, oxygen     RESULTS:    Lab Results   Component Value Date    WBC 11.3 01/23/2024    HGB 10.1 (L) 01/23/2024    HCT 33.4 (L) 01/23/2024    MCV 93 01/23/2024     01/23/2024        Lab Results   Component Value Date    CREATININE 1.19 (H) 01/23/2024    BUN 43 (H) 01/23/2024      01/23/2024    K 4.0 01/23/2024     01/23/2024    CO2 39 (H) 01/23/2024        Radiology Imaging reviewed      ASSESSMENT/PLAN:     1.  LAUREN: Renal function has improved creatinine currently at 1.99.  This may be her new baseline.  She is on ACE inhibitor.  Lasix has been held.  She has heart failure with low EF.  She is stable for discharge from nephrology standpoint.  Serological workup so far is unremarkable with a mildly low C3.  BHAVIN negative.  Antiphospholipase A2 receptor antibody negative.    2.  Fluid overload: Overall improvement.  She is on fluid restriction.  She needs to maintain a low-salt diet.  Can use as needed Lasix.  Continue lisinopril.    3.  Bladder mass: She will need outpatient urology evaluation.    4.  CKD stage III: Patient with diabetic nephropathy.  Has nephrotic range proteinuria however albuminuria is less than 500 mg.  SPEP UPEP is pending.  She has an EF of 40 to 45%.  Moderately elevated RVSP.  Cortical thinning and atrophy noted in bilateral kidney.    Thank You very much for allowing me to participate in the care of this Patient    This document was created using dragon dictation and may contain unintended error    Tulio Anguiano MD   01/23/24

## 2024-01-23 NOTE — NURSING NOTE
Comments: Met with patient to reinforce education and discuss discharge plan. Discussed CHF, signs and symptoms, and when to call cardiologist. Reinforced the importance of following a Low Sodium Diet and monitoring daily weight, lower leg edema, and shortness of breath. Reviewed importance of taking prescribed medications after discharge and following up with Dr. Krueger as instructed. Made aware of follow up appointment scheduled with Dr. Krueger on February 5, 2024. Patient verbalized understanding.

## 2024-01-23 NOTE — DISCHARGE SUMMARY
Discharge Diagnosis  Acute hypoxic respiratory failure  Left lower lobe pneumonia  Left lower lobe atelectasis  Acute exacerbation of COPD  Scattered pulmonary nodules  NSTEMI  Elevated troponin  HFrEF  Moderate aortic stenosis  LAUREN  Hyperkalemia  Hematuria  Bladder mass  Acute anemia  Transaminitis  HTN  Hypothyroidism  Type II DM    Issues Requiring Follow-Up      Discharge Meds     Your medication list        START taking these medications        Instructions Last Dose Given Next Dose Due   aspirin 81 mg chewable tablet  Start taking on: January 24, 2024      Chew 1 tablet (81 mg) once daily. Do not start before January 24, 2024.       atorvastatin 40 mg tablet  Commonly known as: Lipitor      Take 1 tablet (40 mg) by mouth once daily at bedtime.       clopidogrel 75 mg tablet  Commonly known as: Plavix  Start taking on: January 24, 2024      Take 1 tablet (75 mg) by mouth once daily for 361 doses. Do not start before January 24, 2024.       furosemide 20 mg tablet  Commonly known as: Lasix      Take 1 tablet (20 mg) by mouth once daily.       ipratropium 0.02 % nebulizer solution  Commonly known as: Atrovent      Take 2.5 mL (0.5 mg) by nebulization 2 times a day.       levoFLOXacin 250 mg tablet  Commonly known as: Levaquin      Take 1 tablet (250 mg) by mouth once every 24 hours for 2 doses.       lisinopril 2.5 mg tablet  Start taking on: January 24, 2024      Take 1 tablet (2.5 mg) by mouth once daily. Do not start before January 24, 2024.       oxygen gas therapy  Commonly known as: O2      Inhale 1 each once daily.              CONTINUE taking these medications        Instructions Last Dose Given Next Dose Due   amLODIPine 2.5 mg tablet  Commonly known as: Norvasc      Take 1 tablet (2.5 mg) by mouth once daily.       levothyroxine 75 mcg tablet  Commonly known as: Synthroid, Levoxyl           metFORMIN 500 mg tablet  Commonly known as: Glucophage           TURMERIC ORAL                     Where to Get  Your Medications        These medications were sent to [facility] - [place], [place] - [address]  [address], [facility] [address]      Phone: [phone]   clopidogrel 75 mg tablet       These medications were sent to [id number] - [place], [place] - [address]  [address] [address]      Phone: [phone]   aspirin 81 mg chewable tablet  atorvastatin 40 mg tablet  furosemide 20 mg tablet  ipratropium 0.02 % nebulizer solution  levoFLOXacin 250 mg tablet  lisinopril 2.5 mg tablet       Information about where to get these medications is not yet available    Ask your nurse or doctor about these medications  oxygen gas therapy         Test Results Pending At Discharge  Pending Labs       Order Current Status    Serum Protein Electrophoresis + Immunofixation In process    Serum Protein Electrophoresis + Immunofixation In process    Urine Immunofixation In process    Urine Protein Electrophoresis In process    Urine Protein Electrophoresis In process            Hospital Course   Lupe Oshea is an 83-year-old female with past medical history of HTN, HLD, hypothyroidism, CKD 3, type II DM, presented to hospital 1 week history of confusion and mechanical fall.  When she was admitted she developed acute toxic respiratory failure and was placed on oxygen initially 6 L.  CT showed development of left lower lobe consolidation and atelectasis.  She was treated with antibiotics including Rocephin and azithromycin.  She was also seen by pulmonology and they diagnosed her with COPD exacerbation and started on IV Solu-Medrol.  Cardiology reviewed and stated that she suffered an NSTEMI 2 weeks before admission.  She was started on conservative medical management for NSTEMI including aspirin, Plavix, high-dose statin.  An echo was performed which showed HFrEF with reduced ejection fraction of 40 to 45%.  She was diuresed with IV 20 mg Lasix.  She is to go home on 20  mg oral Lasix once daily.  She also developed prerenal LAUREN which was improved with use of Lasix.  She has a 2-year history of hematuria.  She had renal ultrasound which showed a bladder mass concerning for malignancy.  Urology reviewed her and advised to plan for outpatient cystoscopy B/L RGP TURBT as outpatient.  She also had acute anemia suspected secondary to the longstanding hematuria that she has had.  She is also started on lisinopril for diabetic nephropathy.  Echo also showed diagnosis of moderate aortic stenosis which will be followed up outpatient by cardiology.  Neurology team reviewed her and performed serology workup which was all negative.  She has CKD stage III with diabetic nephropathy.  Will complete 2 days of Levaquin to complete her 7-day full course antibiotics for treatment pneumonia on discharge.  Will also take 2 L nasal cannula oxygen at home with her.  She is now medically fit for discharge.    Pertinent Physical Exam At Time of Discharge  Physical Exam  General:  Pleasant and cooperative. No apparent distress.  HEENT:  Normocephalic, atraumatic, mucus membranes moist.   Neck:  Trachea midline.     Chest: no crackles or significant wheezing  CV:  Regular rate and rhythm.  Positive S1/S2.   Abdomen: Bowel sounds present in all four quadrants, abdomen is soft, non-tender, non-distended.  Extremities:  no lower extremity edema or cyanosis.   Neurological:  AAOx3. No focal deficits.  Skin:  Warm and dry.  Outpatient Follow-Up  Future Appointments   Date Time Provider Department Center   2/5/2024  3:15 PM Faisal Krueger MD PhD [id number] [place]   4/15/2024 10:30 AM Rosa Isela Bowen, APRN-[facility] [id number] [place]         Clarisa Varma MD  PGY1 internal medicine

## 2024-01-24 ENCOUNTER — DOCUMENTATION (OUTPATIENT)
Dept: HOME HEALTH SERVICES | Facility: HOME HEALTH | Age: 84
End: 2024-01-24
Payer: MEDICARE

## 2024-01-24 ENCOUNTER — TELEPHONE (OUTPATIENT)
Dept: PRIMARY CARE | Facility: CLINIC | Age: 84
End: 2024-01-24

## 2024-01-24 ENCOUNTER — HOME HEALTH ADMISSION (OUTPATIENT)
Dept: HOME HEALTH SERVICES | Facility: HOME HEALTH | Age: 84
End: 2024-01-24
Payer: MEDICARE

## 2024-01-24 NOTE — TELEPHONE ENCOUNTER
Patient discharged from [facility] yesterday. Breana from [facility] would like verbal orders for homecare services. Breana can be reached at [phone].

## 2024-01-24 NOTE — HH CARE COORDINATION
Home Care received a Referral for Nursing. PT and OTWe have processed the referral for a Start of Care on 1.25.24.     If you have any questions or concerns, please feel free to contact us at [phone]. Follow the prompts, enter your five digit zip code, and you will be directed to your care team on WEST 3.

## 2024-01-26 LAB
ATRIAL RATE: 46 BPM
P AXIS: 87 DEGREES
PR INTERVAL: 132 MS
Q ONSET: 257 MS
QRS COUNT: 14 BEATS
QRS DURATION: 131 MS
QT INTERVAL: 312 MS
QTC CALCULATION(BAZETT): 415 MS
QTC FREDERICIA: 377 MS
R AXIS: 112 DEGREES
T AXIS: -47 DEGREES
T OFFSET: 413 MS
VENTRICULAR RATE: 106 BPM

## 2024-01-28 LAB
ALBUMIN MFR UR ELPH: 28.1 %
ALPHA1 GLOB MFR UR ELPH: 1.4 %
ALPHA2 GLOB MFR UR ELPH: 6 %
B-GLOBULIN MFR UR ELPH: 60.8 %
GAMMA GLOB MFR UR ELPH: 3.7 %
IMMUNOFIXATION COMMENT: NORMAL
PATH REVIEW - URINE IMMUNOFIXATION: NORMAL
PATH REVIEW-URINE PROTEIN ELECTROPHORESIS: NORMAL
URINE ELECTROPHORESIS COMMENT: NORMAL

## 2024-01-29 PROBLEM — R53.83 FATIGUE: Status: ACTIVE | Noted: 2024-01-29

## 2024-01-29 PROBLEM — E11.9 TYPE 2 DIABETES MELLITUS (MULTI): Status: ACTIVE | Noted: 2023-04-20

## 2024-01-29 PROBLEM — H61.22 IMPACTED CERUMEN OF LEFT EAR: Status: ACTIVE | Noted: 2024-01-29

## 2024-01-29 PROBLEM — D17.1 LIPOMA OF BACK: Status: ACTIVE | Noted: 2024-01-29

## 2024-01-29 PROBLEM — R22.9 SUBCUTANEOUS MASS: Status: ACTIVE | Noted: 2024-01-29

## 2024-01-29 PROBLEM — L81.9 CHANGE IN PIGMENTED SKIN LESION: Status: ACTIVE | Noted: 2024-01-29

## 2024-01-29 PROBLEM — E03.9 HYPOTHYROIDISM: Status: ACTIVE | Noted: 2023-04-20

## 2024-01-29 PROBLEM — H91.90 HEARING LOSS: Status: ACTIVE | Noted: 2024-01-29

## 2024-01-29 PROBLEM — I10 BENIGN ESSENTIAL HYPERTENSION: Status: ACTIVE | Noted: 2022-07-07

## 2024-01-29 PROBLEM — R41.3 MEMORY LOSS: Status: ACTIVE | Noted: 2024-01-29

## 2024-01-29 PROBLEM — E78.00 HYPERCHOLESTEROLEMIA: Status: ACTIVE | Noted: 2023-04-20

## 2024-01-29 PROBLEM — M75.50 BURSITIS OF SHOULDER: Status: ACTIVE | Noted: 2024-01-29

## 2024-01-29 PROBLEM — L40.9 PSORIASIS: Status: ACTIVE | Noted: 2024-01-29

## 2024-01-29 PROBLEM — D22.9 MULTIPLE BENIGN NEVI: Status: ACTIVE | Noted: 2024-01-29

## 2024-01-29 PROBLEM — S46.919A STRAIN OF SHOULDER: Status: ACTIVE | Noted: 2024-01-29

## 2024-01-29 PROBLEM — K64.4 EXTERNAL HEMORRHOID: Status: ACTIVE | Noted: 2022-07-07

## 2024-01-29 LAB
ALBUMIN: 3.5 G/DL (ref 3.4–5)
ALPHA 1 GLOBULIN: 0.4 G/DL (ref 0.2–0.6)
ALPHA 2 GLOBULIN: 0.7 G/DL (ref 0.4–1.1)
BETA GLOBULIN: 0.7 G/DL (ref 0.5–1.2)
GAMMA GLOBULIN: 0.7 G/DL (ref 0.5–1.4)
IMMUNOFIXATION COMMENT: NORMAL
PATH REVIEW - SERUM IMMUNOFIXATION: NORMAL
PATH REVIEW-SERUM PROTEIN ELECTROPHORESIS: NORMAL
PROTEIN ELECTROPHORESIS COMMENT: NORMAL

## 2024-02-05 ENCOUNTER — APPOINTMENT (OUTPATIENT)
Dept: CARDIOLOGY | Facility: CLINIC | Age: 84
End: 2024-02-05
Payer: MEDICARE

## 2024-02-07 ENCOUNTER — HOSPITAL ENCOUNTER (OUTPATIENT)
Dept: RADIOLOGY | Facility: HOSPITAL | Age: 84
Discharge: HOME | End: 2024-02-07
Payer: MEDICARE

## 2024-02-07 ENCOUNTER — OFFICE VISIT (OUTPATIENT)
Dept: CARDIOLOGY | Facility: CLINIC | Age: 84
End: 2024-02-07
Payer: MEDICARE

## 2024-02-07 ENCOUNTER — LAB (OUTPATIENT)
Dept: LAB | Facility: LAB | Age: 84
End: 2024-02-07
Payer: MEDICARE

## 2024-02-07 VITALS
SYSTOLIC BLOOD PRESSURE: 100 MMHG | DIASTOLIC BLOOD PRESSURE: 50 MMHG | HEIGHT: 60 IN | WEIGHT: 126.4 LBS | BODY MASS INDEX: 24.81 KG/M2 | HEART RATE: 70 BPM | OXYGEN SATURATION: 94 %

## 2024-02-07 DIAGNOSIS — I50.9 CONGESTIVE HEART FAILURE, UNSPECIFIED HF CHRONICITY, UNSPECIFIED HEART FAILURE TYPE (MULTI): ICD-10-CM

## 2024-02-07 DIAGNOSIS — I25.82 CHRONIC TOTAL OCCLUSION OF NATIVE CORONARY ARTERY: ICD-10-CM

## 2024-02-07 DIAGNOSIS — I25.10 CHRONIC TOTAL OCCLUSION OF NATIVE CORONARY ARTERY: ICD-10-CM

## 2024-02-07 DIAGNOSIS — I50.22 CHRONIC SYSTOLIC HEART FAILURE (MULTI): ICD-10-CM

## 2024-02-07 DIAGNOSIS — I21.4 NSTEMI (NON-ST ELEVATED MYOCARDIAL INFARCTION) (MULTI): Primary | ICD-10-CM

## 2024-02-07 DIAGNOSIS — I10 BENIGN HYPERTENSION: ICD-10-CM

## 2024-02-07 DIAGNOSIS — I21.4 NSTEMI (NON-ST ELEVATED MYOCARDIAL INFARCTION) (MULTI): ICD-10-CM

## 2024-02-07 DIAGNOSIS — I10 BENIGN ESSENTIAL HYPERTENSION: ICD-10-CM

## 2024-02-07 LAB
ANION GAP SERPL CALC-SCNC: 11 MMOL/L (ref 10–20)
BNP SERPL-MCNC: 274 PG/ML (ref 0–99)
BUN SERPL-MCNC: 37 MG/DL (ref 6–23)
CALCIUM SERPL-MCNC: 8.7 MG/DL (ref 8.6–10.3)
CHLORIDE SERPL-SCNC: 104 MMOL/L (ref 98–107)
CO2 SERPL-SCNC: 32 MMOL/L (ref 21–32)
CREAT SERPL-MCNC: 1.28 MG/DL (ref 0.5–1.05)
EGFRCR SERPLBLD CKD-EPI 2021: 41 ML/MIN/1.73M*2
ERYTHROCYTE [DISTWIDTH] IN BLOOD BY AUTOMATED COUNT: 19 % (ref 11.5–14.5)
GLUCOSE SERPL-MCNC: 93 MG/DL (ref 74–99)
HCT VFR BLD AUTO: 31.7 % (ref 36–46)
HGB BLD-MCNC: 9.5 G/DL (ref 12–16)
MCH RBC QN AUTO: 28.6 PG (ref 26–34)
MCHC RBC AUTO-ENTMCNC: 30 G/DL (ref 32–36)
MCV RBC AUTO: 96 FL (ref 80–100)
NRBC BLD-RTO: 0 /100 WBCS (ref 0–0)
PLATELET # BLD AUTO: 279 X10*3/UL (ref 150–450)
POTASSIUM SERPL-SCNC: 4.4 MMOL/L (ref 3.5–5.3)
RBC # BLD AUTO: 3.32 X10*6/UL (ref 4–5.2)
SODIUM SERPL-SCNC: 143 MMOL/L (ref 136–145)
WBC # BLD AUTO: 8 X10*3/UL (ref 4.4–11.3)

## 2024-02-07 PROCEDURE — 71046 X-RAY EXAM CHEST 2 VIEWS: CPT

## 2024-02-07 PROCEDURE — 3074F SYST BP LT 130 MM HG: CPT | Performed by: STUDENT IN AN ORGANIZED HEALTH CARE EDUCATION/TRAINING PROGRAM

## 2024-02-07 PROCEDURE — 83880 ASSAY OF NATRIURETIC PEPTIDE: CPT

## 2024-02-07 PROCEDURE — 99214 OFFICE O/P EST MOD 30 MIN: CPT | Performed by: STUDENT IN AN ORGANIZED HEALTH CARE EDUCATION/TRAINING PROGRAM

## 2024-02-07 PROCEDURE — 1159F MED LIST DOCD IN RCRD: CPT | Performed by: STUDENT IN AN ORGANIZED HEALTH CARE EDUCATION/TRAINING PROGRAM

## 2024-02-07 PROCEDURE — 85027 COMPLETE CBC AUTOMATED: CPT

## 2024-02-07 PROCEDURE — 1111F DSCHRG MED/CURRENT MED MERGE: CPT | Performed by: STUDENT IN AN ORGANIZED HEALTH CARE EDUCATION/TRAINING PROGRAM

## 2024-02-07 PROCEDURE — 3078F DIAST BP <80 MM HG: CPT | Performed by: STUDENT IN AN ORGANIZED HEALTH CARE EDUCATION/TRAINING PROGRAM

## 2024-02-07 PROCEDURE — 1126F AMNT PAIN NOTED NONE PRSNT: CPT | Performed by: STUDENT IN AN ORGANIZED HEALTH CARE EDUCATION/TRAINING PROGRAM

## 2024-02-07 PROCEDURE — 80048 BASIC METABOLIC PNL TOTAL CA: CPT

## 2024-02-07 PROCEDURE — 71046 X-RAY EXAM CHEST 2 VIEWS: CPT | Performed by: RADIOLOGY

## 2024-02-07 PROCEDURE — 1036F TOBACCO NON-USER: CPT | Performed by: STUDENT IN AN ORGANIZED HEALTH CARE EDUCATION/TRAINING PROGRAM

## 2024-02-07 RX ORDER — IBUPROFEN 200 MG
1 TABLET ORAL
COMMUNITY
Start: 2019-05-13 | End: 2024-05-23 | Stop reason: ENTERED-IN-ERROR

## 2024-02-07 RX ORDER — ATORVASTATIN CALCIUM 40 MG/1
40 TABLET, FILM COATED ORAL NIGHTLY
Qty: 90 TABLET | Refills: 3 | Status: SHIPPED | OUTPATIENT
Start: 2024-02-07 | End: 2024-02-12 | Stop reason: SDUPTHER

## 2024-02-07 RX ORDER — LISINOPRIL 2.5 MG/1
2.5 TABLET ORAL DAILY
Qty: 90 TABLET | Refills: 3 | Status: SHIPPED | OUTPATIENT
Start: 2024-02-07 | End: 2024-02-21 | Stop reason: SDUPTHER

## 2024-02-07 RX ORDER — CLOPIDOGREL BISULFATE 75 MG/1
75 TABLET ORAL DAILY
Qty: 90 TABLET | Refills: 3 | Status: SHIPPED | OUTPATIENT
Start: 2024-02-07 | End: 2024-02-12 | Stop reason: SDUPTHER

## 2024-02-07 RX ORDER — FUROSEMIDE 20 MG/1
20 TABLET ORAL DAILY PRN
Qty: 30 TABLET | Refills: 3 | Status: SHIPPED | OUTPATIENT
Start: 2024-02-07 | End: 2024-02-21 | Stop reason: SDUPTHER

## 2024-02-07 RX ORDER — AMLODIPINE BESYLATE 2.5 MG/1
2.5 TABLET ORAL DAILY
Qty: 90 TABLET | Refills: 3 | Status: SHIPPED | OUTPATIENT
Start: 2024-02-07 | End: 2024-02-12 | Stop reason: WASHOUT

## 2024-02-07 ASSESSMENT — PAIN SCALES - GENERAL: PAINLEVEL: 0-NO PAIN

## 2024-02-07 NOTE — PROGRESS NOTES
Chief Complaint:   [facility].f/u for having an unknown Heart attack, falling      History Of Present Illness:    Lupe Oshea is a 84 y.o. female past med history notable for Hypertension, hyperlipidemia, hypothyroidism, CKD stage III and type 2 diabetes who was admitted to the hospital with confusion and a mechanical fall.  She initially developed acute respiratory failure requiring 6 L of supplemental oxygen.  CT scan was notable for a left lobe consolidation versus atelectasis and patient was initiated on Rocephin and azithromycin.  She is also treated for COPD exacerbation.  Patient was noted to have elevated troponins and review of history was noted the patient had an initial event of discomfort 2 weeks prior.  She was diagnosed with NSTEMI.  Echo revealed ejection fraction that was reduced at 4045% with multiple regional wall motion abnormalities and mild to moderate aortic valve regurgitation.  Left heart cath revealed the presence of a  LAD with collaterals filling from the first and second diagonal arteries.  Severe branch vessel PAD was also noted with mild disease in the left circumflex and RCA territories.  Patient was kept on aspirin and Plavix.  Diuresed with IV Lasix.  She is placed on goal directed medical therapy as tolerated.  She is discharged home on supplemental oxygen.  Since discharge she denies any chest pain shortness of breath or palpitations.  No dizziness lightheadedness or syncopal events.     Last Recorded Vitals:  Vitals:    02/07/24 1448   BP: 100/50   BP Location: Left arm   Patient Position: Sitting   BP Cuff Size: Adult   Pulse: 70   SpO2: 94%   Weight: 57.3 kg (126 lb 6.4 oz)   Height: 1.524 m (5')       Review of Systems  ROS      Allergies:  Patient has no known allergies.    Outpatient Medications:  Current Outpatient Medications   Medication Instructions    amLODIPine (NORVASC) 2.5 mg, oral, Daily    aspirin 81 mg, oral, Daily    atorvastatin (LIPITOR) 40 mg, oral,  Nightly    clopidogrel (PLAVIX) 75 mg, oral, Daily    furosemide (LASIX) 20 mg, oral, Daily PRN    ipratropium (ATROVENT) 0.5 mg, nebulization, 2 times daily    levothyroxine (Synthroid, Levoxyl) 75 mcg tablet 1 tablet, oral, Daily    lisinopril 2.5 mg, oral, Daily    metFORMIN (Glucophage) 500 mg tablet 1 tablet, oral, Daily    nicotine (Nicoderm CQ) 21 mg/24 hr patch 1 patch, transdermal, Daily RT    oxygen (O2) gas therapy 1 each, inhalation, Daily    TURMERIC ORAL 1 capsule, oral, Daily       Physical Exam:  General: No acute distress, frail, A&O x3 on supplemental oxygen.  Sinus present  Skin: Warm and dry  Neck: JVD is not elevated  ENT: Moist mucous membranes no lesions appreciated  Pulmonary: Breath sounds are diminished bilaterally  Cards: Regular rate rhythm, no murmurs gallops or rubs appreciated normal S1-S2  Abdomen: Soft nontender nondistended  Extremities: Patient is wearing compression hoses but otherwise trace edema noted bilaterally  Psych: Appropriate mood and affect     Last Labs:  CBC -  Lab Results   Component Value Date    WBC 11.3 01/23/2024    HGB 10.1 (L) 01/23/2024    HCT 33.4 (L) 01/23/2024    MCV 93 01/23/2024     01/23/2024       CMP -  Lab Results   Component Value Date    CALCIUM 8.3 (L) 01/23/2024    PHOS 3.7 01/23/2024    PROT 6.0 (L) 01/19/2024    ALBUMIN 3.1 (L) 01/23/2024    AST 39 01/19/2024     (H) 01/19/2024    ALKPHOS 72 01/19/2024    BILITOT 0.3 01/19/2024       LIPID PANEL -   Lab Results   Component Value Date    CHOL 123 01/17/2024    TRIG 41 01/17/2024    HDL 47.6 01/17/2024    CHHDL 2.6 01/17/2024    LDLF 114 (H) 09/13/2023    VLDL 8 01/17/2024    NHDL 75 01/17/2024       RENAL FUNCTION PANEL -   Lab Results   Component Value Date    GLUCOSE 86 01/23/2024     01/23/2024    K 4.0 01/23/2024     01/23/2024    CO2 39 (H) 01/23/2024    ANIONGAP 7 (L) 01/23/2024    BUN 43 (H) 01/23/2024    CREATININE 1.19 (H) 01/23/2024    CALCIUM 8.3 (L) 01/23/2024     PHOS 3.7 01/23/2024    ALBUMIN 3.1 (L) 01/23/2024        Lab Results   Component Value Date    BNP 2,731 (H) 01/17/2024    HGBA1C 6.0 (A) 09/13/2023    HGBA1C 6.5 10/19/2020       Last Cardiology Tests:  ECG:  Encounter Date: 01/17/24   ECG 12 lead   Result Value    Ventricular Rate 106    Atrial Rate 46    NE Interval 132    QRS Duration 131    QT Interval 312    QTC Calculation(Bazett) 415    P Axis 87    R Axis 112    T Axis -47    QRS Count 14    Q Onset 257    T Offset 413    QTC Fredericia 377    Narrative    Sinus tachycardia  Ventricular bigeminy  Probable left atrial enlargement  Nonspecific intraventricular conduction delay  Lateral infarct, age indeterminate  Probable anteroseptal infarct, old        Echo:  No results found for this or any previous visit from the past 1095 days.       Ejection Fractions:  EF   Date/Time Value Ref Range Status   01/18/2024 10:15 AM 59         Cath:  CV NCDR CATHPCI V5 COLLECTION FORM     Assessment and Plan    A/P     1.  NSTEMI: Initial symptoms of fatigue and weakness with atypical chest pain GERD that occurred 2 weeks prior to hospital admission.  Peak high-sensitivity troponin of 769.  Echo showed new wall motion abnormality in the LAD territory with elevated troponins.   Left heart cath revealed  mid LAD mild to moderate disease in the remaining coronary vessels.   Decision was made for medical management for now.  Patient is chest pain-free today.  Continue aspirin, Plavix and high intensity statin therapy.       -Check CBC    2.  Heart failure reduced ejection fraction: EF of 40 to 45%: Presented with acute on chronic exacerbation, bilateral pleural effusions, transaminitis felt to be related to congestion and volume overload.  Modest improvement with IV Lasix.  She is on oral Lasix 20 mg daily, lisinopril 2.5 mg daily.  She is currently not on beta-blockade due to low blood pressures and low resting heart rate.    -Check BMP/BNP  -Chest x-ray ordered    3.   Hypertension: Blood pressures have been low.  They are borderline today.  If pressures remain low or patient develops symptoms then we will discontinue amlodipine.    (This note was generated with voice recognition software and may contain errors including spelling, grammar, syntax and missed recognition of what was dictated, of which may not have been fully corrected)     Faisal Krueger MD PhD

## 2024-02-09 ENCOUNTER — TELEPHONE (OUTPATIENT)
Dept: CARDIOLOGY | Facility: CLINIC | Age: 84
End: 2024-02-09
Payer: MEDICARE

## 2024-02-09 NOTE — TELEPHONE ENCOUNTER
----- Message from Faisal Krueger MD PhD sent at 2/9/2024  1:16 PM EST -----  Notify patient of cxr results. No changes to medications. Lupe Oshea  can follow up as scheduled.

## 2024-02-12 ENCOUNTER — LAB (OUTPATIENT)
Dept: LAB | Facility: LAB | Age: 84
End: 2024-02-12
Payer: MEDICARE

## 2024-02-12 ENCOUNTER — OFFICE VISIT (OUTPATIENT)
Dept: PRIMARY CARE | Facility: CLINIC | Age: 84
End: 2024-02-12
Payer: MEDICARE

## 2024-02-12 VITALS
DIASTOLIC BLOOD PRESSURE: 54 MMHG | HEIGHT: 60 IN | BODY MASS INDEX: 24.94 KG/M2 | WEIGHT: 127 LBS | SYSTOLIC BLOOD PRESSURE: 92 MMHG

## 2024-02-12 DIAGNOSIS — D50.9 IRON DEFICIENCY ANEMIA, UNSPECIFIED IRON DEFICIENCY ANEMIA TYPE: ICD-10-CM

## 2024-02-12 DIAGNOSIS — E03.9 HYPOTHYROIDISM, UNSPECIFIED TYPE: Primary | ICD-10-CM

## 2024-02-12 DIAGNOSIS — J44.1 CHRONIC OBSTRUCTIVE PULMONARY DISEASE WITH ACUTE EXACERBATION (MULTI): ICD-10-CM

## 2024-02-12 DIAGNOSIS — E03.9 HYPOTHYROIDISM, UNSPECIFIED TYPE: ICD-10-CM

## 2024-02-12 DIAGNOSIS — E11.9 TYPE 2 DIABETES MELLITUS WITHOUT COMPLICATION, WITHOUT LONG-TERM CURRENT USE OF INSULIN (MULTI): ICD-10-CM

## 2024-02-12 DIAGNOSIS — I21.4 NSTEMI (NON-ST ELEVATED MYOCARDIAL INFARCTION) (MULTI): ICD-10-CM

## 2024-02-12 DIAGNOSIS — I10 BENIGN ESSENTIAL HYPERTENSION: ICD-10-CM

## 2024-02-12 LAB
ERYTHROCYTE [DISTWIDTH] IN BLOOD BY AUTOMATED COUNT: 19 % (ref 11.5–14.5)
FERRITIN SERPL-MCNC: 412 NG/ML (ref 8–150)
HCT VFR BLD AUTO: 31.1 % (ref 36–46)
HGB BLD-MCNC: 9.1 G/DL (ref 12–16)
IRON SATN MFR SERPL: 18 % (ref 25–45)
IRON SERPL-MCNC: 52 UG/DL (ref 35–150)
MCH RBC QN AUTO: 27.2 PG (ref 26–34)
MCHC RBC AUTO-ENTMCNC: 29.3 G/DL (ref 32–36)
MCV RBC AUTO: 93 FL (ref 80–100)
NRBC BLD-RTO: 0 /100 WBCS (ref 0–0)
PLATELET # BLD AUTO: 250 X10*3/UL (ref 150–450)
RBC # BLD AUTO: 3.35 X10*6/UL (ref 4–5.2)
TIBC SERPL-MCNC: 287 UG/DL (ref 240–445)
TSH SERPL-ACNC: 2.23 MIU/L (ref 0.44–3.98)
UIBC SERPL-MCNC: 235 UG/DL (ref 110–370)
WBC # BLD AUTO: 6.6 X10*3/UL (ref 4.4–11.3)

## 2024-02-12 PROCEDURE — 84443 ASSAY THYROID STIM HORMONE: CPT

## 2024-02-12 PROCEDURE — 99215 OFFICE O/P EST HI 40 MIN: CPT | Performed by: STUDENT IN AN ORGANIZED HEALTH CARE EDUCATION/TRAINING PROGRAM

## 2024-02-12 PROCEDURE — 1111F DSCHRG MED/CURRENT MED MERGE: CPT | Performed by: STUDENT IN AN ORGANIZED HEALTH CARE EDUCATION/TRAINING PROGRAM

## 2024-02-12 PROCEDURE — 85027 COMPLETE CBC AUTOMATED: CPT

## 2024-02-12 PROCEDURE — 1160F RVW MEDS BY RX/DR IN RCRD: CPT | Performed by: STUDENT IN AN ORGANIZED HEALTH CARE EDUCATION/TRAINING PROGRAM

## 2024-02-12 PROCEDURE — 3078F DIAST BP <80 MM HG: CPT | Performed by: STUDENT IN AN ORGANIZED HEALTH CARE EDUCATION/TRAINING PROGRAM

## 2024-02-12 PROCEDURE — 83540 ASSAY OF IRON: CPT

## 2024-02-12 PROCEDURE — 1126F AMNT PAIN NOTED NONE PRSNT: CPT | Performed by: STUDENT IN AN ORGANIZED HEALTH CARE EDUCATION/TRAINING PROGRAM

## 2024-02-12 PROCEDURE — 1159F MED LIST DOCD IN RCRD: CPT | Performed by: STUDENT IN AN ORGANIZED HEALTH CARE EDUCATION/TRAINING PROGRAM

## 2024-02-12 PROCEDURE — 82728 ASSAY OF FERRITIN: CPT

## 2024-02-12 PROCEDURE — 1036F TOBACCO NON-USER: CPT | Performed by: STUDENT IN AN ORGANIZED HEALTH CARE EDUCATION/TRAINING PROGRAM

## 2024-02-12 PROCEDURE — 83550 IRON BINDING TEST: CPT

## 2024-02-12 PROCEDURE — 3074F SYST BP LT 130 MM HG: CPT | Performed by: STUDENT IN AN ORGANIZED HEALTH CARE EDUCATION/TRAINING PROGRAM

## 2024-02-12 PROCEDURE — 36415 COLL VENOUS BLD VENIPUNCTURE: CPT

## 2024-02-12 RX ORDER — CLOPIDOGREL BISULFATE 75 MG/1
75 TABLET ORAL DAILY
Qty: 90 TABLET | Refills: 1 | Status: SHIPPED | OUTPATIENT
Start: 2024-02-12 | End: 2024-05-13 | Stop reason: SDUPTHER

## 2024-02-12 RX ORDER — IPRATROPIUM BROMIDE 0.5 MG/2.5ML
0.5 SOLUTION RESPIRATORY (INHALATION) 2 TIMES DAILY
Qty: 125 ML | Refills: 6 | Status: SHIPPED | OUTPATIENT
Start: 2024-02-12 | End: 2024-08-10

## 2024-02-12 RX ORDER — LEVOTHYROXINE SODIUM 75 UG/1
75 TABLET ORAL DAILY
Qty: 90 TABLET | Refills: 1 | Status: SHIPPED | OUTPATIENT
Start: 2024-02-12 | End: 2024-03-08 | Stop reason: SDUPTHER

## 2024-02-12 RX ORDER — ATORVASTATIN CALCIUM 40 MG/1
40 TABLET, FILM COATED ORAL NIGHTLY
Qty: 90 TABLET | Refills: 1 | Status: SHIPPED | OUTPATIENT
Start: 2024-02-12 | End: 2024-02-21 | Stop reason: SDUPTHER

## 2024-02-12 ASSESSMENT — ENCOUNTER SYMPTOMS
CONSTITUTIONAL NEGATIVE: 1
CARDIOVASCULAR NEGATIVE: 1
COUGH: 1
MUSCULOSKELETAL NEGATIVE: 1
PSYCHIATRIC NEGATIVE: 1
WEAKNESS: 1
GASTROINTESTINAL NEGATIVE: 1
SHORTNESS OF BREATH: 1

## 2024-02-12 NOTE — PROGRESS NOTES
Subjective   Patient ID: Lupe Oshea is a 84 y.o. female who presents for New Patient Visit, Hospital Follow-up (Heart attack; You saw her in [place]), Med Refill (Amlodipine; Synthroid), and Hypotension (92/49 taken twice sec reading 90/49).    Patient is a 84-year-old female with past medical history of NSTEMI, cad, dm, COPD, CKD, hyperlipidemia, bladder mass, chronic respiratory failure who presents for establish care.  Patient recently was admitted to the hospital for multiple medical comorbidities and conditions.  Due to underlying NSTEMI as well as pneumonia and acute hypoxic respiratory failure with COPD exacerbation.  Patient was overall medically optimized, and was stable enough to be discharged home with home health care.  Patient states since discharge, she has been feeling overall well.  Breathing has been stable and she has been working well with home health care.  Feels as if her strength is improving but is not at baseline.  Has planned follow-up with her specialist.  She is also seen urology in the coming days, understands that she has a bladder mass, that likely will need to be resected however, also understands that to do this she may require sedation which may put patient at significant harm.  Otherwise, per her son she has modified her lifestyle due to recent medical ailments.  Continues to cut down on smoking and has quit since hospitalization.  Otherwise feels well and states no additional issues.    Past medical history as above  Past surgical history denies  Social history denies any alcohol, former tobacco use, negative drug use  Family history noncontributory         Review of Systems   Constitutional: Negative.    HENT: Negative.     Respiratory:  Positive for cough and shortness of breath.    Cardiovascular: Negative.    Gastrointestinal: Negative.    Musculoskeletal: Negative.    Neurological:  Positive for weakness.   Psychiatric/Behavioral: Negative.         Objective   BP 92/54    Ht 1.524 m (5')   Wt 57.6 kg (127 lb)   BMI 24.80 kg/m²     Physical Exam  Constitutional:       General: She is not in acute distress.     Appearance: She is not ill-appearing.   Eyes:      Pupils: Pupils are equal, round, and reactive to light.   Cardiovascular:      Rate and Rhythm: Normal rate and regular rhythm.      Pulses: Normal pulses.      Heart sounds: No murmur heard.  Pulmonary:      Effort: No respiratory distress.      Breath sounds: No wheezing.      Comments: On o2, no wheezing noted  Abdominal:      General: Abdomen is flat. Bowel sounds are normal. There is no distension.   Musculoskeletal:      Right lower leg: No edema.      Left lower leg: No edema.   Skin:     General: Skin is warm and dry.   Neurological:      Mental Status: She is alert. Mental status is at baseline.      Cranial Nerves: No cranial nerve deficit.      Motor: No weakness.   Psychiatric:         Mood and Affect: Mood normal.         Behavior: Behavior normal.         Assessment/Plan   Problem List Items Addressed This Visit             ICD-10-CM    Type 2 diabetes mellitus (CMS/[facility]) E11.9    Benign essential hypertension I10    Hypothyroidism - Primary E03.9    Relevant Medications    levothyroxine (Synthroid, Levoxyl) 75 mcg tablet    Other Relevant Orders    TSH with reflex to Free T4 if abnormal     Other Visit Diagnoses         Codes    Iron deficiency anemia, unspecified iron deficiency anemia type     D50.9    Relevant Orders    CBC    Iron and TIBC    Ferritin    Chronic obstructive pulmonary disease with acute exacerbation (CMS/[facility])     J44.1    Relevant Medications    ipratropium (Atrovent) 0.02 % nebulizer solution    NSTEMI (non-ST elevated myocardial infarction) (CMS/[facility])     I21.4    Relevant Medications    atorvastatin (Lipitor) 40 mg tablet    clopidogrel (Plavix) 75 mg tablet          Patient seen on establish care status post hospitalization    #COPD  #Chronic hypoxic respiratory failure  #Status post  NSTEMI  #CAD  #Diabetes  #Hypertension  # Hematuria  #Anemia  #CKD  #Bladder mass  #Ambulatory dysfunction  #Former tobacco use    Patient's extensive hospitalization reviewed, orders reviewed from hospitalization notes.    During medication reconciliation, noted.  Continued on Plavix, however patient would benefit from this long-term if hemoglobin stable.  Discussed with cardiology, recommend aspirin and Plavix closely monitor hemoglobin    Check iron studies due to chronic anemia, may benefit from iron therapy  Advise follow-up with nephrology  As per HPI, does have underlying bladder mass may need surgical intervention, however is very high risk, would recommend cardiology clearance prior to any surgical intervention.  Likely will need inpatient hospitalization if any procedure would be to them.  Patient will consider her options when discussing with urology and follow-up with regards to this     Avoid nephrotoxins, highly advised to follow-up with nephrology    Check iron studies and CBC today    Routine lab work at appropriate time with wellness exam    Wean down O2 as tolerated, goal greater than 88% with COPD    Patient overall benefiting from home health care, continue this    #Health maintenance  Medicare wellness at next visit  Lab work at next visit other than lab work as above  Advise age-appropriate vaccinations and screenings    Return to care in 3 to 5 months or as needed

## 2024-02-21 DIAGNOSIS — I21.4 NSTEMI (NON-ST ELEVATED MYOCARDIAL INFARCTION) (MULTI): ICD-10-CM

## 2024-02-21 DIAGNOSIS — I50.9 CONGESTIVE HEART FAILURE, UNSPECIFIED HF CHRONICITY, UNSPECIFIED HEART FAILURE TYPE (MULTI): ICD-10-CM

## 2024-02-21 RX ORDER — LISINOPRIL 2.5 MG/1
2.5 TABLET ORAL DAILY
Qty: 90 TABLET | Refills: 3 | Status: SHIPPED | OUTPATIENT
Start: 2024-02-21 | End: 2025-02-20

## 2024-02-21 RX ORDER — FUROSEMIDE 20 MG/1
20 TABLET ORAL DAILY PRN
Qty: 90 TABLET | Refills: 3 | Status: SHIPPED | OUTPATIENT
Start: 2024-02-21 | End: 2024-02-22 | Stop reason: SDUPTHER

## 2024-02-21 RX ORDER — ATORVASTATIN CALCIUM 40 MG/1
40 TABLET, FILM COATED ORAL NIGHTLY
Qty: 90 TABLET | Refills: 3 | Status: SHIPPED | OUTPATIENT
Start: 2024-02-21 | End: 2025-02-20

## 2024-02-22 ENCOUNTER — TELEPHONE (OUTPATIENT)
Dept: PRIMARY CARE | Facility: CLINIC | Age: 84
End: 2024-02-22

## 2024-02-22 DIAGNOSIS — J44.1 CHRONIC OBSTRUCTIVE PULMONARY DISEASE WITH ACUTE EXACERBATION (MULTI): ICD-10-CM

## 2024-02-22 DIAGNOSIS — I50.9 CONGESTIVE HEART FAILURE, UNSPECIFIED HF CHRONICITY, UNSPECIFIED HEART FAILURE TYPE (MULTI): ICD-10-CM

## 2024-02-22 DIAGNOSIS — J44.1 CHRONIC OBSTRUCTIVE PULMONARY DISEASE WITH ACUTE EXACERBATION (MULTI): Primary | ICD-10-CM

## 2024-02-22 RX ORDER — FUROSEMIDE 20 MG/1
20 TABLET ORAL DAILY
Qty: 90 TABLET | Refills: 3 | Status: SHIPPED | OUTPATIENT
Start: 2024-02-22 | End: 2025-02-21

## 2024-02-22 RX ORDER — IPRATROPIUM BROMIDE 0.5 MG/2.5ML
500 SOLUTION RESPIRATORY (INHALATION) 4 TIMES DAILY PRN
Qty: 312.5 ML | Refills: 5 | Status: SHIPPED | OUTPATIENT
Start: 2024-02-22 | End: 2025-02-21

## 2024-02-27 RX ORDER — IPRATROPIUM BROMIDE 0.5 MG/2.5ML
500 SOLUTION RESPIRATORY (INHALATION) 4 TIMES DAILY PRN
Qty: 312.5 ML | Refills: 5 | OUTPATIENT
Start: 2024-02-27 | End: 2025-02-26

## 2024-04-03 ENCOUNTER — TELEPHONE (OUTPATIENT)
Dept: CARDIOLOGY | Facility: CLINIC | Age: 84
End: 2024-04-03
Payer: MEDICARE

## 2024-04-03 NOTE — TELEPHONE ENCOUNTER
Pt's son Mj called stating pt had indigestion 1 week ago while eating yogurt. Pt has not experienced symptom since. Per pt's son, indigestion did not last long but wanted to ensure it was not cardiac related since she had indigestion last time she had heart attack. Denies SOB, dizziness, chest pain, fatigue, or edema. Pt's son asking what signs/symptoms to look for. Reviewed red flag s/s with patient's son. Instructed to call office with any further questions/concerns.

## 2024-04-15 ENCOUNTER — APPOINTMENT (OUTPATIENT)
Dept: PRIMARY CARE | Facility: CLINIC | Age: 84
End: 2024-04-15
Payer: MEDICARE

## 2024-05-13 ENCOUNTER — LAB (OUTPATIENT)
Dept: LAB | Facility: LAB | Age: 84
End: 2024-05-13
Payer: MEDICARE

## 2024-05-13 ENCOUNTER — OFFICE VISIT (OUTPATIENT)
Dept: PRIMARY CARE | Facility: CLINIC | Age: 84
End: 2024-05-13
Payer: MEDICARE

## 2024-05-13 VITALS — DIASTOLIC BLOOD PRESSURE: 40 MMHG | WEIGHT: 119 LBS | BODY MASS INDEX: 23.24 KG/M2 | SYSTOLIC BLOOD PRESSURE: 90 MMHG

## 2024-05-13 DIAGNOSIS — I21.4 NSTEMI (NON-ST ELEVATED MYOCARDIAL INFARCTION) (MULTI): ICD-10-CM

## 2024-05-13 DIAGNOSIS — Z13.1 DIABETES MELLITUS SCREENING: ICD-10-CM

## 2024-05-13 DIAGNOSIS — Z78.0 ASYMPTOMATIC MENOPAUSAL STATE: ICD-10-CM

## 2024-05-13 DIAGNOSIS — Z00.00 ROUTINE GENERAL MEDICAL EXAMINATION AT HEALTH CARE FACILITY: Primary | ICD-10-CM

## 2024-05-13 DIAGNOSIS — N32.89 BLADDER MASS: ICD-10-CM

## 2024-05-13 PROBLEM — I25.82 CHRONIC TOTAL OCCLUSION OF CORONARY ARTERY: Status: ACTIVE | Noted: 2024-05-13

## 2024-05-13 PROBLEM — I50.9 CONGESTIVE HEART FAILURE (MULTI): Status: ACTIVE | Noted: 2024-05-13

## 2024-05-13 PROBLEM — J96.00 ACUTE RESPIRATORY FAILURE (MULTI): Status: ACTIVE | Noted: 2024-05-13

## 2024-05-13 PROBLEM — K64.4 EXTERNAL HEMORRHOIDS: Status: ACTIVE | Noted: 2022-07-07

## 2024-05-13 PROBLEM — L98.9 CHANGING SKIN LESION: Status: ACTIVE | Noted: 2024-01-29

## 2024-05-13 PROBLEM — R41.3 AMNESIA: Status: ACTIVE | Noted: 2024-01-29

## 2024-05-13 PROBLEM — M79.89 SWELLING OF LOWER EXTREMITY: Status: ACTIVE | Noted: 2024-05-13

## 2024-05-13 PROBLEM — I82.409 DEEP VEIN THROMBOSIS (DVT) OF LOWER EXTREMITY (MULTI): Status: ACTIVE | Noted: 2024-05-13

## 2024-05-13 PROBLEM — J44.1 ACUTE EXACERBATION OF CHRONIC OBSTRUCTIVE PULMONARY DISEASE (MULTI): Status: ACTIVE | Noted: 2024-05-13

## 2024-05-13 PROBLEM — D22.9 MULTIPLE BENIGN MELANOCYTIC NEVI: Status: ACTIVE | Noted: 2024-01-29

## 2024-05-13 PROBLEM — I49.8 VENTRICULAR BIGEMINY: Status: ACTIVE | Noted: 2024-05-13

## 2024-05-13 LAB
ALBUMIN SERPL BCP-MCNC: 3.8 G/DL (ref 3.4–5)
ALP SERPL-CCNC: 88 U/L (ref 33–136)
ALT SERPL W P-5'-P-CCNC: 8 U/L (ref 7–45)
ANION GAP SERPL CALC-SCNC: 16 MMOL/L (ref 10–20)
AST SERPL W P-5'-P-CCNC: 13 U/L (ref 9–39)
BILIRUB SERPL-MCNC: 0.3 MG/DL (ref 0–1.2)
BUN SERPL-MCNC: 56 MG/DL (ref 6–23)
CALCIUM SERPL-MCNC: 8.8 MG/DL (ref 8.6–10.6)
CHLORIDE SERPL-SCNC: 104 MMOL/L (ref 98–107)
CO2 SERPL-SCNC: 24 MMOL/L (ref 21–32)
CREAT SERPL-MCNC: 1.63 MG/DL (ref 0.5–1.05)
EGFRCR SERPLBLD CKD-EPI 2021: 31 ML/MIN/1.73M*2
ERYTHROCYTE [DISTWIDTH] IN BLOOD BY AUTOMATED COUNT: 14.3 % (ref 11.5–14.5)
GLUCOSE SERPL-MCNC: 95 MG/DL (ref 74–99)
HCT VFR BLD AUTO: 24.9 % (ref 36–46)
HGB BLD-MCNC: 7.6 G/DL (ref 12–16)
MCH RBC QN AUTO: 30.3 PG (ref 26–34)
MCHC RBC AUTO-ENTMCNC: 30.5 G/DL (ref 32–36)
MCV RBC AUTO: 99 FL (ref 80–100)
NRBC BLD-RTO: 0 /100 WBCS (ref 0–0)
PLATELET # BLD AUTO: 350 X10*3/UL (ref 150–450)
POTASSIUM SERPL-SCNC: 4.8 MMOL/L (ref 3.5–5.3)
PROT SERPL-MCNC: 6.3 G/DL (ref 6.4–8.2)
RBC # BLD AUTO: 2.51 X10*6/UL (ref 4–5.2)
SODIUM SERPL-SCNC: 139 MMOL/L (ref 136–145)
WBC # BLD AUTO: 8 X10*3/UL (ref 4.4–11.3)

## 2024-05-13 PROCEDURE — 99214 OFFICE O/P EST MOD 30 MIN: CPT | Performed by: STUDENT IN AN ORGANIZED HEALTH CARE EDUCATION/TRAINING PROGRAM

## 2024-05-13 PROCEDURE — G0446 INTENS BEHAVE THER CARDIO DX: HCPCS | Performed by: STUDENT IN AN ORGANIZED HEALTH CARE EDUCATION/TRAINING PROGRAM

## 2024-05-13 PROCEDURE — 3078F DIAST BP <80 MM HG: CPT | Performed by: STUDENT IN AN ORGANIZED HEALTH CARE EDUCATION/TRAINING PROGRAM

## 2024-05-13 PROCEDURE — 1160F RVW MEDS BY RX/DR IN RCRD: CPT | Performed by: STUDENT IN AN ORGANIZED HEALTH CARE EDUCATION/TRAINING PROGRAM

## 2024-05-13 PROCEDURE — G0439 PPPS, SUBSEQ VISIT: HCPCS | Performed by: STUDENT IN AN ORGANIZED HEALTH CARE EDUCATION/TRAINING PROGRAM

## 2024-05-13 PROCEDURE — 1123F ACP DISCUSS/DSCN MKR DOCD: CPT | Performed by: STUDENT IN AN ORGANIZED HEALTH CARE EDUCATION/TRAINING PROGRAM

## 2024-05-13 PROCEDURE — 36415 COLL VENOUS BLD VENIPUNCTURE: CPT

## 2024-05-13 PROCEDURE — 3074F SYST BP LT 130 MM HG: CPT | Performed by: STUDENT IN AN ORGANIZED HEALTH CARE EDUCATION/TRAINING PROGRAM

## 2024-05-13 PROCEDURE — 85027 COMPLETE CBC AUTOMATED: CPT

## 2024-05-13 PROCEDURE — 1158F ADVNC CARE PLAN TLK DOCD: CPT | Performed by: STUDENT IN AN ORGANIZED HEALTH CARE EDUCATION/TRAINING PROGRAM

## 2024-05-13 PROCEDURE — 1159F MED LIST DOCD IN RCRD: CPT | Performed by: STUDENT IN AN ORGANIZED HEALTH CARE EDUCATION/TRAINING PROGRAM

## 2024-05-13 PROCEDURE — 1170F FXNL STATUS ASSESSED: CPT | Performed by: STUDENT IN AN ORGANIZED HEALTH CARE EDUCATION/TRAINING PROGRAM

## 2024-05-13 PROCEDURE — G0444 DEPRESSION SCREEN ANNUAL: HCPCS | Performed by: STUDENT IN AN ORGANIZED HEALTH CARE EDUCATION/TRAINING PROGRAM

## 2024-05-13 PROCEDURE — 1036F TOBACCO NON-USER: CPT | Performed by: STUDENT IN AN ORGANIZED HEALTH CARE EDUCATION/TRAINING PROGRAM

## 2024-05-13 PROCEDURE — 80053 COMPREHEN METABOLIC PANEL: CPT

## 2024-05-13 RX ORDER — CLOPIDOGREL BISULFATE 75 MG/1
75 TABLET ORAL DAILY
Qty: 90 TABLET | Refills: 1 | Status: SHIPPED | OUTPATIENT
Start: 2024-05-13 | End: 2024-11-09

## 2024-05-13 ASSESSMENT — ENCOUNTER SYMPTOMS
ABDOMINAL PAIN: 0
VOMITING: 0
PALPITATIONS: 0
EYE PAIN: 0
SHORTNESS OF BREATH: 0
CHILLS: 0
DYSURIA: 0
SEIZURES: 0
FEVER: 0
DIARRHEA: 0
COUGH: 0
JOINT SWELLING: 0
HEMATURIA: 0

## 2024-05-13 ASSESSMENT — ACTIVITIES OF DAILY LIVING (ADL)
DRESSING: INDEPENDENT
MANAGING_FINANCES: INDEPENDENT
GROCERY_SHOPPING: INDEPENDENT
DOING_HOUSEWORK: INDEPENDENT
BATHING: INDEPENDENT
TAKING_MEDICATION: INDEPENDENT

## 2024-05-13 ASSESSMENT — PATIENT HEALTH QUESTIONNAIRE - PHQ9
1. LITTLE INTEREST OR PLEASURE IN DOING THINGS: NOT AT ALL
2. FEELING DOWN, DEPRESSED OR HOPELESS: NOT AT ALL
SUM OF ALL RESPONSES TO PHQ9 QUESTIONS 1 AND 2: 0

## 2024-05-13 NOTE — PROGRESS NOTES
Follow up and med refill and wellness visit    Subjective   Reason for Visit: Lupe Oshea is an 84 y.o. female here for a Medicare Wellness visit.     Patient here for follow up. Patient doing okay, son present as well. She is on 1 liter nasal cannula with activity only. No chest pain, dyspnea, nausea, vomiting, abdominal pain, blood in stool. She continues home PT.    Patient Care Team:  Migel Spring DO as PCP - General (Internal Medicine)  Faisal Krueger MD PhD as Consulting Physician (Cardiology)     Review of Systems   Constitutional:  Negative for chills and fever.   HENT:  Negative for ear pain.    Eyes:  Negative for pain.   Respiratory:  Negative for cough and shortness of breath.    Cardiovascular:  Negative for chest pain and palpitations.   Gastrointestinal:  Negative for abdominal pain, diarrhea and vomiting.   Genitourinary:  Negative for dysuria and hematuria.   Musculoskeletal:  Negative for joint swelling.   Skin:  Negative for rash.   Neurological:  Negative for seizures and syncope.   All other systems reviewed and are negative.      Objective   Vitals:  BP (!) 90/40   Wt 54 kg (119 lb)   BMI 23.24 kg/m²       Physical Exam  Vitals and nursing note reviewed.   Constitutional:       General: She is not in acute distress.     Appearance: She is not ill-appearing or toxic-appearing.   HENT:      Head: Normocephalic and atraumatic.      Right Ear: External ear normal.      Left Ear: External ear normal.      Mouth/Throat:      Mouth: Mucous membranes are moist.   Eyes:      Conjunctiva/sclera: Conjunctivae normal.   Cardiovascular:      Rate and Rhythm: Normal rate and regular rhythm.      Heart sounds: Normal heart sounds.   Pulmonary:      Effort: Pulmonary effort is normal.      Breath sounds: Rales present.   Abdominal:      General: There is no distension.      Palpations: Abdomen is soft.      Tenderness: There is no abdominal tenderness. There is no guarding.    Musculoskeletal:      Cervical back: Neck supple.      Right lower leg: No edema.      Left lower leg: No edema.   Skin:     General: Skin is warm and dry.   Neurological:      General: No focal deficit present.      Mental Status: She is alert and oriented to person, place, and time.   Psychiatric:         Behavior: Behavior normal.         Assessment/Plan   Problem List Items Addressed This Visit    None  Visit Diagnoses       NSTEMI (non-ST elevated myocardial infarction) (Multi)                #COPD  #Chronic hypoxic respiratory failure  #Status post NSTEMI  #CAD  #Diabetes  #Hypertension  # Hematuria  #Anemia  #CKD  #Bladder mass  #Ambulatory dysfunction  #Former tobacco use     Continue DAPT, statin. On lasix and low dose lisinopril. Has follow up with cardiology next month.     Avoid nephrotoxins, highly advised to follow-up with nephrology. Recheck BMP.  Check CBC to monitor anemia       Wean down O2 as tolerated, goal greater than 88% with COPD. Only on 1 liter nasal cannula with activity. Continue atrovent.    Patient/son thinking of switching urology groups. Will place referral in case.     Continue levothyroxine and metformin.      Patient overall benefiting from home health care, continue this     #Health maintenance  Medicare wellness at next visit  Routine labs  Advise age-appropriate vaccinations and screenings     Return to care in 3 to 5 months or as needed

## 2024-05-13 NOTE — PROGRESS NOTES
Subjective   Reason for Visit: Lupe Oshea is an 84 y.o. female here for a Medicare Wellness visit.     Past Medical, Surgical, and Family History reviewed and updated in chart.    Reviewed all medications by prescribing practitioner or clinical pharmacist (such as prescriptions, OTCs, herbal therapies and supplements) and documented in the medical record.    HPI    Patient Care Team:  Migel Spring DO as PCP - General (Internal Medicine)  Faisal Krueger MD PhD as Consulting Physician (Cardiology)     Review of Systems    Objective   Vitals:  BP (!) 90/40   Wt 54 kg (119 lb)   BMI 23.24 kg/m²       Physical Exam    Assessment/Plan   Problem List Items Addressed This Visit    None  Visit Diagnoses       Routine general medical examination at health care facility    -  Primary    NSTEMI (non-ST elevated myocardial infarction) (Multi)        Relevant Medications    clopidogrel (Plavix) 75 mg tablet    Other Relevant Orders    CBC    Bladder mass        Relevant Orders    Referral to Urology    Diabetes mellitus screening        Relevant Orders    Comprehensive Metabolic Panel    Asymptomatic menopausal state        Relevant Orders    XR DEXA bone density          Reason for Visit: Lupe Oshea is an 84 y.o. female here for a Medicare Wellness visit.      Patient here for follow up. Patient doing okay, son present as well. She is on 1 liter nasal cannula with activity only. No chest pain, dyspnea, nausea, vomiting, abdominal pain, blood in stool. She continues home PT.     Patient Care Team:  Migel Spring DO as PCP - General (Internal Medicine)  Faisal Krueger MD PhD as Consulting Physician (Cardiology)      Review of Systems   Constitutional:  Negative for chills and fever.   HENT:  Negative for ear pain.    Eyes:  Negative for pain.   Respiratory:  Negative for cough and shortness of breath.    Cardiovascular:  Negative for chest pain and palpitations.   Gastrointestinal:   Negative for abdominal pain, diarrhea and vomiting.   Genitourinary:  Negative for dysuria and hematuria.   Musculoskeletal:  Negative for joint swelling.   Skin:  Negative for rash.   Neurological:  Negative for seizures and syncope.   All other systems reviewed and are negative.              Objective   Vitals:  BP (!) 90/40   Wt 54 kg (119 lb)   BMI 23.24 kg/m²        Physical Exam  Vitals and nursing note reviewed.   Constitutional:       General: She is not in acute distress.     Appearance: She is not ill-appearing or toxic-appearing.   HENT:      Head: Normocephalic and atraumatic.      Right Ear: External ear normal.      Left Ear: External ear normal.      Mouth/Throat:      Mouth: Mucous membranes are moist.   Eyes:      Conjunctiva/sclera: Conjunctivae normal.   Cardiovascular:      Rate and Rhythm: Normal rate and regular rhythm.      Heart sounds: Normal heart sounds.   Pulmonary:      Effort: Pulmonary effort is normal.      Breath sounds: Rales present.   Abdominal:      General: There is no distension.      Palpations: Abdomen is soft.      Tenderness: There is no abdominal tenderness. There is no guarding.   Musculoskeletal:      Cervical back: Neck supple.      Right lower leg: No edema.      Left lower leg: No edema.   Skin:     General: Skin is warm and dry.   Neurological:      General: No focal deficit present.      Mental Status: She is alert and oriented to person, place, and time.   Psychiatric:         Behavior: Behavior normal.                  Assessment/Plan   Problem List Items Addressed This Visit    None  Visit Diagnoses         NSTEMI (non-ST elevated myocardial infarction) (Multi)                   #COPD  #Chronic hypoxic respiratory failure  #Status post NSTEMI  #CAD  #Diabetes  #Hypertension  # Hematuria  #Anemia  #CKD  #Bladder mass  #Ambulatory dysfunction  #Former tobacco use     Continue DAPT, statin. On lasix and low dose lisinopril. Has follow up with cardiology next  month.      Avoid nephrotoxins, highly advised to follow-up with nephrology. Recheck BMP.  Check CBC to monitor anemia        Wean down O2 as tolerated, goal greater than 88% with COPD. Only on 1 liter nasal cannula with activity. Continue atrovent.     Patient/son thinking of switching urology groups. Will place referral in case.      Continue levothyroxine and metformin.      Patient overall benefiting from home health care, continue this     #Health maintenance  Medicare wellness at next visit  Routine labs  Advise age-appropriate vaccinations and screenings     Return to care in 3 to 5 months or as needed        Patient seen in conjunction with resident physician.  Recommend weaning down O2, did monitor patient on room air today and did not drop below 90%.  Recommend oxygenation with ambulation as well as sleeping and continue weaning down, referral to cardiac rehab, continue supportive care, CBC CMP today

## 2024-05-16 DIAGNOSIS — R31.9 HEMATURIA, UNSPECIFIED TYPE: Primary | ICD-10-CM

## 2024-05-20 ENCOUNTER — LAB (OUTPATIENT)
Dept: LAB | Facility: LAB | Age: 84
End: 2024-05-20
Payer: MEDICARE

## 2024-05-20 DIAGNOSIS — R31.9 HEMATURIA, UNSPECIFIED TYPE: ICD-10-CM

## 2024-05-20 LAB
ALBUMIN SERPL BCP-MCNC: 3.7 G/DL (ref 3.4–5)
ALP SERPL-CCNC: 85 U/L (ref 33–136)
ALT SERPL W P-5'-P-CCNC: 8 U/L (ref 7–45)
ANION GAP SERPL CALC-SCNC: 17 MMOL/L (ref 10–20)
AST SERPL W P-5'-P-CCNC: 13 U/L (ref 9–39)
BILIRUB SERPL-MCNC: 0.3 MG/DL (ref 0–1.2)
BUN SERPL-MCNC: 55 MG/DL (ref 6–23)
CALCIUM SERPL-MCNC: 8.8 MG/DL (ref 8.6–10.6)
CHLORIDE SERPL-SCNC: 105 MMOL/L (ref 98–107)
CO2 SERPL-SCNC: 24 MMOL/L (ref 21–32)
CREAT SERPL-MCNC: 1.69 MG/DL (ref 0.5–1.05)
EGFRCR SERPLBLD CKD-EPI 2021: 29 ML/MIN/1.73M*2
ERYTHROCYTE [DISTWIDTH] IN BLOOD BY AUTOMATED COUNT: 14.3 % (ref 11.5–14.5)
GLUCOSE SERPL-MCNC: 99 MG/DL (ref 74–99)
HCT VFR BLD AUTO: 25.6 % (ref 36–46)
HGB BLD-MCNC: 7.4 G/DL (ref 12–16)
MCH RBC QN AUTO: 28.6 PG (ref 26–34)
MCHC RBC AUTO-ENTMCNC: 28.9 G/DL (ref 32–36)
MCV RBC AUTO: 99 FL (ref 80–100)
NRBC BLD-RTO: 0 /100 WBCS (ref 0–0)
PLATELET # BLD AUTO: 359 X10*3/UL (ref 150–450)
POTASSIUM SERPL-SCNC: 4.7 MMOL/L (ref 3.5–5.3)
PROT SERPL-MCNC: 6.1 G/DL (ref 6.4–8.2)
RBC # BLD AUTO: 2.59 X10*6/UL (ref 4–5.2)
SODIUM SERPL-SCNC: 141 MMOL/L (ref 136–145)
WBC # BLD AUTO: 7.6 X10*3/UL (ref 4.4–11.3)

## 2024-05-20 PROCEDURE — 80053 COMPREHEN METABOLIC PANEL: CPT

## 2024-05-20 PROCEDURE — 36415 COLL VENOUS BLD VENIPUNCTURE: CPT

## 2024-05-20 PROCEDURE — 85027 COMPLETE CBC AUTOMATED: CPT

## 2024-05-22 ENCOUNTER — TELEPHONE (OUTPATIENT)
Dept: CARDIOLOGY | Facility: CLINIC | Age: 84
End: 2024-05-22
Payer: MEDICARE

## 2024-05-22 NOTE — TELEPHONE ENCOUNTER
----- Message from Faisal Krueger MD PhD sent at 5/21/2024  4:48 PM EDT -----  Sasha, let's have the patient discontinue aspirin.

## 2024-05-23 ENCOUNTER — TELEPHONE (OUTPATIENT)
Dept: OBSTETRICS AND GYNECOLOGY | Facility: CLINIC | Age: 84
End: 2024-05-23
Payer: MEDICARE

## 2024-05-23 ENCOUNTER — HOSPITAL ENCOUNTER (INPATIENT)
Facility: HOSPITAL | Age: 84
LOS: 4 days | Discharge: HOME | DRG: 669 | End: 2024-05-29
Attending: STUDENT IN AN ORGANIZED HEALTH CARE EDUCATION/TRAINING PROGRAM | Admitting: STUDENT IN AN ORGANIZED HEALTH CARE EDUCATION/TRAINING PROGRAM
Payer: MEDICARE

## 2024-05-23 DIAGNOSIS — R31.0 GROSS HEMATURIA: Primary | ICD-10-CM

## 2024-05-23 PROBLEM — R31.9 HEMATURIA: Status: ACTIVE | Noted: 2024-05-23

## 2024-05-23 LAB
ABO GROUP (TYPE) IN BLOOD: NORMAL
ALBUMIN SERPL BCP-MCNC: 4 G/DL (ref 3.4–5)
ALP SERPL-CCNC: 86 U/L (ref 33–136)
ALT SERPL W P-5'-P-CCNC: 8 U/L (ref 7–45)
ANION GAP SERPL CALC-SCNC: 15 MMOL/L (ref 10–20)
ANTIBODY SCREEN: NORMAL
APPEARANCE UR: ABNORMAL
APTT PPP: 35 SECONDS (ref 27–38)
AST SERPL W P-5'-P-CCNC: 13 U/L (ref 9–39)
BASOPHILS # BLD AUTO: 0.08 X10*3/UL (ref 0–0.1)
BASOPHILS NFR BLD AUTO: 0.9 %
BILIRUB SERPL-MCNC: 0.3 MG/DL (ref 0–1.2)
BILIRUB UR STRIP.AUTO-MCNC: NEGATIVE MG/DL
BUN SERPL-MCNC: 55 MG/DL (ref 6–23)
CALCIUM SERPL-MCNC: 9.2 MG/DL (ref 8.6–10.3)
CHLORIDE SERPL-SCNC: 105 MMOL/L (ref 98–107)
CO2 SERPL-SCNC: 24 MMOL/L (ref 21–32)
COLOR UR: ABNORMAL
CREAT SERPL-MCNC: 1.77 MG/DL (ref 0.5–1.05)
EGFRCR SERPLBLD CKD-EPI 2021: 28 ML/MIN/1.73M*2
EOSINOPHIL # BLD AUTO: 0.24 X10*3/UL (ref 0–0.4)
EOSINOPHIL NFR BLD AUTO: 2.6 %
ERYTHROCYTE [DISTWIDTH] IN BLOOD BY AUTOMATED COUNT: 14.5 % (ref 11.5–14.5)
GLUCOSE SERPL-MCNC: 112 MG/DL (ref 74–99)
GLUCOSE UR STRIP.AUTO-MCNC: NEGATIVE MG/DL
HCT VFR BLD AUTO: 25.7 % (ref 36–46)
HGB BLD-MCNC: 7.8 G/DL (ref 12–16)
IMM GRANULOCYTES # BLD AUTO: 0.04 X10*3/UL (ref 0–0.5)
IMM GRANULOCYTES NFR BLD AUTO: 0.4 % (ref 0–0.9)
INR PPP: 1.1 (ref 0.9–1.1)
KETONES UR STRIP.AUTO-MCNC: NEGATIVE MG/DL
LEUKOCYTE ESTERASE UR QL STRIP.AUTO: ABNORMAL
LYMPHOCYTES # BLD AUTO: 0.96 X10*3/UL (ref 0.8–3)
LYMPHOCYTES NFR BLD AUTO: 10.2 %
MCH RBC QN AUTO: 29.7 PG (ref 26–34)
MCHC RBC AUTO-ENTMCNC: 30.4 G/DL (ref 32–36)
MCV RBC AUTO: 98 FL (ref 80–100)
MONOCYTES # BLD AUTO: 0.71 X10*3/UL (ref 0.05–0.8)
MONOCYTES NFR BLD AUTO: 7.5 %
NEUTROPHILS # BLD AUTO: 7.38 X10*3/UL (ref 1.6–5.5)
NEUTROPHILS NFR BLD AUTO: 78.4 %
NITRITE UR QL STRIP.AUTO: NEGATIVE
NRBC BLD-RTO: 0 /100 WBCS (ref 0–0)
PH UR STRIP.AUTO: 6.5 [PH]
PLATELET # BLD AUTO: 325 X10*3/UL (ref 150–450)
POTASSIUM SERPL-SCNC: 4.8 MMOL/L (ref 3.5–5.3)
PROT SERPL-MCNC: 6.8 G/DL (ref 6.4–8.2)
PROT UR STRIP.AUTO-MCNC: ABNORMAL MG/DL
PROTHROMBIN TIME: 12.8 SECONDS (ref 9.8–12.8)
RBC # BLD AUTO: 2.63 X10*6/UL (ref 4–5.2)
RBC # UR STRIP.AUTO: ABNORMAL /UL
RBC #/AREA URNS AUTO: >20 /HPF
RH FACTOR (ANTIGEN D): NORMAL
SODIUM SERPL-SCNC: 139 MMOL/L (ref 136–145)
SP GR UR STRIP.AUTO: >1.03
SQUAMOUS #/AREA URNS AUTO: ABNORMAL /HPF
UROBILINOGEN UR STRIP.AUTO-MCNC: ABNORMAL MG/DL
WBC # BLD AUTO: 9.4 X10*3/UL (ref 4.4–11.3)
WBC #/AREA URNS AUTO: ABNORMAL /HPF

## 2024-05-23 PROCEDURE — 36415 COLL VENOUS BLD VENIPUNCTURE: CPT | Performed by: STUDENT IN AN ORGANIZED HEALTH CARE EDUCATION/TRAINING PROGRAM

## 2024-05-23 PROCEDURE — 86920 COMPATIBILITY TEST SPIN: CPT | Mod: 91

## 2024-05-23 PROCEDURE — 99285 EMERGENCY DEPT VISIT HI MDM: CPT

## 2024-05-23 PROCEDURE — 85025 COMPLETE CBC W/AUTO DIFF WBC: CPT | Performed by: STUDENT IN AN ORGANIZED HEALTH CARE EDUCATION/TRAINING PROGRAM

## 2024-05-23 PROCEDURE — 86901 BLOOD TYPING SEROLOGIC RH(D): CPT | Performed by: STUDENT IN AN ORGANIZED HEALTH CARE EDUCATION/TRAINING PROGRAM

## 2024-05-23 PROCEDURE — 81001 URINALYSIS AUTO W/SCOPE: CPT | Performed by: STUDENT IN AN ORGANIZED HEALTH CARE EDUCATION/TRAINING PROGRAM

## 2024-05-23 PROCEDURE — 85610 PROTHROMBIN TIME: CPT | Performed by: STUDENT IN AN ORGANIZED HEALTH CARE EDUCATION/TRAINING PROGRAM

## 2024-05-23 PROCEDURE — 99222 1ST HOSP IP/OBS MODERATE 55: CPT

## 2024-05-23 PROCEDURE — G0378 HOSPITAL OBSERVATION PER HR: HCPCS

## 2024-05-23 PROCEDURE — 2500000001 HC RX 250 WO HCPCS SELF ADMINISTERED DRUGS (ALT 637 FOR MEDICARE OP)

## 2024-05-23 PROCEDURE — 80053 COMPREHEN METABOLIC PANEL: CPT | Performed by: STUDENT IN AN ORGANIZED HEALTH CARE EDUCATION/TRAINING PROGRAM

## 2024-05-23 PROCEDURE — 87086 URINE CULTURE/COLONY COUNT: CPT | Mod: PARLAB | Performed by: STUDENT IN AN ORGANIZED HEALTH CARE EDUCATION/TRAINING PROGRAM

## 2024-05-23 PROCEDURE — 82436 ASSAY OF URINE CHLORIDE: CPT

## 2024-05-23 RX ORDER — CLOPIDOGREL BISULFATE 75 MG/1
75 TABLET ORAL DAILY
Status: DISCONTINUED | OUTPATIENT
Start: 2024-05-23 | End: 2024-05-29 | Stop reason: HOSPADM

## 2024-05-23 RX ORDER — LEVOTHYROXINE SODIUM 75 UG/1
75 TABLET ORAL
Status: DISCONTINUED | OUTPATIENT
Start: 2024-05-24 | End: 2024-05-29 | Stop reason: HOSPADM

## 2024-05-23 RX ORDER — LISINOPRIL 5 MG/1
2.5 TABLET ORAL DAILY
Status: DISCONTINUED | OUTPATIENT
Start: 2024-05-23 | End: 2024-05-29 | Stop reason: HOSPADM

## 2024-05-23 RX ORDER — ATORVASTATIN CALCIUM 40 MG/1
40 TABLET, FILM COATED ORAL NIGHTLY
Status: DISCONTINUED | OUTPATIENT
Start: 2024-05-23 | End: 2024-05-29 | Stop reason: HOSPADM

## 2024-05-23 RX ORDER — FUROSEMIDE 20 MG/1
20 TABLET ORAL DAILY
Status: DISCONTINUED | OUTPATIENT
Start: 2024-05-23 | End: 2024-05-29 | Stop reason: HOSPADM

## 2024-05-23 RX ADMIN — ATORVASTATIN CALCIUM 40 MG: 40 TABLET, FILM COATED ORAL at 21:39

## 2024-05-23 SDOH — SOCIAL STABILITY: SOCIAL INSECURITY: HAVE YOU HAD THOUGHTS OF HARMING ANYONE ELSE?: NO

## 2024-05-23 SDOH — SOCIAL STABILITY: SOCIAL INSECURITY: ARE YOU OR HAVE YOU BEEN THREATENED OR ABUSED PHYSICALLY, EMOTIONALLY, OR SEXUALLY BY ANYONE?: NO

## 2024-05-23 SDOH — SOCIAL STABILITY: SOCIAL INSECURITY: HAVE YOU HAD ANY THOUGHTS OF HARMING ANYONE ELSE?: NO

## 2024-05-23 SDOH — SOCIAL STABILITY: SOCIAL INSECURITY: DO YOU FEEL UNSAFE GOING BACK TO THE PLACE WHERE YOU ARE LIVING?: NO

## 2024-05-23 SDOH — SOCIAL STABILITY: SOCIAL INSECURITY: DO YOU FEEL ANYONE HAS EXPLOITED OR TAKEN ADVANTAGE OF YOU FINANCIALLY OR OF YOUR PERSONAL PROPERTY?: NO

## 2024-05-23 SDOH — SOCIAL STABILITY: SOCIAL INSECURITY: ABUSE: ADULT

## 2024-05-23 SDOH — SOCIAL STABILITY: SOCIAL INSECURITY: DOES ANYONE TRY TO KEEP YOU FROM HAVING/CONTACTING OTHER FRIENDS OR DOING THINGS OUTSIDE YOUR HOME?: NO

## 2024-05-23 SDOH — SOCIAL STABILITY: SOCIAL INSECURITY: WERE YOU ABLE TO COMPLETE ALL THE BEHAVIORAL HEALTH SCREENINGS?: YES

## 2024-05-23 SDOH — SOCIAL STABILITY: SOCIAL INSECURITY: ARE THERE ANY APPARENT SIGNS OF INJURIES/BEHAVIORS THAT COULD BE RELATED TO ABUSE/NEGLECT?: NO

## 2024-05-23 SDOH — SOCIAL STABILITY: SOCIAL INSECURITY: HAS ANYONE EVER THREATENED TO HURT YOUR FAMILY OR YOUR PETS?: NO

## 2024-05-23 ASSESSMENT — PAIN - FUNCTIONAL ASSESSMENT: PAIN_FUNCTIONAL_ASSESSMENT: 0-10

## 2024-05-23 ASSESSMENT — PAIN SCALES - GENERAL
PAINLEVEL_OUTOF10: 0 - NO PAIN

## 2024-05-23 ASSESSMENT — ACTIVITIES OF DAILY LIVING (ADL)
HEARING - LEFT EAR: HEARING AID
TOILETING: INDEPENDENT
BATHING: INDEPENDENT
DRESSING YOURSELF: INDEPENDENT
JUDGMENT_ADEQUATE_SAFELY_COMPLETE_DAILY_ACTIVITIES: YES
ADEQUATE_TO_COMPLETE_ADL: YES
GROOMING: INDEPENDENT
HEARING - RIGHT EAR: HEARING AID
WALKS IN HOME: INDEPENDENT
LACK_OF_TRANSPORTATION: NO
FEEDING YOURSELF: INDEPENDENT
LACK_OF_TRANSPORTATION: NO
PATIENT'S MEMORY ADEQUATE TO SAFELY COMPLETE DAILY ACTIVITIES?: YES

## 2024-05-23 ASSESSMENT — LIFESTYLE VARIABLES
TOTAL SCORE: 0
EVER FELT BAD OR GUILTY ABOUT YOUR DRINKING: NO
AUDIT-C TOTAL SCORE: 0
HAVE PEOPLE ANNOYED YOU BY CRITICIZING YOUR DRINKING: NO
AUDIT-C TOTAL SCORE: 0
HOW OFTEN DO YOU HAVE 6 OR MORE DRINKS ON ONE OCCASION: NEVER
SKIP TO QUESTIONS 9-10: 1
PRESCIPTION_ABUSE_PAST_12_MONTHS: NO
EVER HAD A DRINK FIRST THING IN THE MORNING TO STEADY YOUR NERVES TO GET RID OF A HANGOVER: NO
SUBSTANCE_ABUSE_PAST_12_MONTHS: NO
HOW MANY STANDARD DRINKS CONTAINING ALCOHOL DO YOU HAVE ON A TYPICAL DAY: PATIENT DOES NOT DRINK
HOW OFTEN DO YOU HAVE A DRINK CONTAINING ALCOHOL: NEVER
HAVE YOU EVER FELT YOU SHOULD CUT DOWN ON YOUR DRINKING: NO

## 2024-05-23 ASSESSMENT — COGNITIVE AND FUNCTIONAL STATUS - GENERAL
MOBILITY SCORE: 24
DAILY ACTIVITIY SCORE: 24
PATIENT BASELINE BEDBOUND: NO

## 2024-05-23 ASSESSMENT — COLUMBIA-SUICIDE SEVERITY RATING SCALE - C-SSRS
6. HAVE YOU EVER DONE ANYTHING, STARTED TO DO ANYTHING, OR PREPARED TO DO ANYTHING TO END YOUR LIFE?: NO
1. IN THE PAST MONTH, HAVE YOU WISHED YOU WERE DEAD OR WISHED YOU COULD GO TO SLEEP AND NOT WAKE UP?: NO
2. HAVE YOU ACTUALLY HAD ANY THOUGHTS OF KILLING YOURSELF?: NO

## 2024-05-23 NOTE — TELEPHONE ENCOUNTER
Patient was notified regarding medication refill. Patient was advise to schedule appointment with DR Zuniga as per Provider's notes.

## 2024-05-23 NOTE — ED TRIAGE NOTES
Pt BIB son for hematuria. Pt son states Pt has known polyps in her bladder and blood in urine is normal for her however last night into this morning Pt noticed a significant amount of blood and clots in her urine. Pt denies abdominal pain, weakness, dizziness, SOB. Pt has no other complaints in triage, Pt VS WNL for Pt in triage.

## 2024-05-23 NOTE — PROGRESS NOTES
Pharmacy Medication History Review    Lupe Oshea is a 84 y.o. female admitted for No Principal Problem: There is no principal problem currently on the Problem List. Please update the Problem List and refresh.. Pharmacy reviewed the patient's [id number] medications and allergies for accuracy.    The list below reflectives the updated PTA list. Please review each medication in order reconciliation for additional clarification and justification.  Prior to Admission medications    Medication Sig Start Date End Date Taking? Authorizing Provider   atorvastatin (Lipitor) 40 mg tablet Take 1 tablet (40 mg) by mouth once daily at bedtime. 2/21/24 2/20/25 Yes Faisal Krueger MD PhD   clopidogrel (Plavix) 75 mg tablet Take 1 tablet (75 mg) by mouth once daily. 5/13/24 11/9/24 Yes Migel Spring DO   furosemide (Lasix) 20 mg tablet Take 1 tablet (20 mg) by mouth once daily. 2/22/24 2/21/25 Yes Faisal Krueger MD PhD   levothyroxine (Synthroid, Levoxyl) 75 mcg tablet Take 1 tablet (75 mcg) by mouth once daily. 3/8/24 9/4/24 Yes Migel Spring DO   lisinopril 2.5 mg tablet Take 1 tablet (2.5 mg) by mouth once daily. 2/21/24 2/20/25 Yes Faisal Krueger MD PhD   metFORMIN (Glucophage) 500 mg tablet Take 1 tablet (500 mg) by mouth once daily. 3/1/22  Yes Historical Provider, MD   ipratropium (Atrovent) 0.02 % nebulizer solution Take 2.5 mL (0.5 mg) by nebulization 2 times a day. 2/12/24 8/10/24  Migel Spring DO   oxygen (O2) gas therapy Inhale 1 each once daily.  Patient taking differently: Inhale 1 L/min if needed. 1/23/24   Clarisa Varma MD        The list below reflectives the updated allergy list. Please review each documented allergy for additional clarification and justification.  Allergies  Reviewed by Jono Hogan, EMT on 5/23/2024   No Known Allergies         Below are additional concerns with the patient's PTA list.      Taryn Rodríguez

## 2024-05-23 NOTE — ED PROVIDER NOTES
HPI   Chief Complaint   Patient presents with    Blood in Urine       This is an 84-year-old female with past medical history of type 2 diabetes, hypertension, upper thyroidism, hyperlipidemia, NSTEMI, heart failure reduced ejection fraction, COPD on O2 at baseline presenting to the emergency department for hematuria.  Patient has a known bladder polyp and hematuria is not unusual for her.  Over the last week she has been having worsening hematuria.  Her blood levels have been monitored by her primary care physician and are dropping.  Her kidney function is also getting worse and she was instructed to come to the emergency department by her primary care physician.  Patient otherwise denies any abdominal pain, back pain, headaches, vision changes, fall/trauma, dysuria.        History provided by:  Patient   used: No                        [place] Coma Scale Score: 15                     Patient History   Past Medical History:   Diagnosis Date    Personal history of other diseases of the circulatory system     History of hypertension    Personal history of other endocrine, nutritional and metabolic disease     History of hyperglycemia    Personal history of other endocrine, nutritional and metabolic disease     History of hyperlipidemia     Past Surgical History:   Procedure Laterality Date    CARDIAC CATHETERIZATION N/A 1/19/2024    Procedure: Left Heart Cath, With LV;  Surgeon: Ronni Muro MD;  Location: [facility] Cardiac Cath Lab;  Service: Cardiovascular;  Laterality: N/A;    OTHER SURGICAL HISTORY  07/13/2022    No history of surgery     No family history on file.  Social History     Tobacco Use    Smoking status: Former     Current packs/day: 0.25     Types: Cigarettes     Passive exposure: Past    Smokeless tobacco: Never   Vaping Use    Vaping status: Never Used   Substance Use Topics    Alcohol use: Never    Drug use: Never       Physical Exam   ED Triage Vitals [05/23/24 1533]    Temperature Heart Rate Respirations BP   37.1 °C (98.8 °F) 71 18 108/50      Pulse Ox Temp Source Heart Rate Source Patient Position   95 % Temporal Monitor Sitting      BP Location FiO2 (%)     Right arm --       Physical Exam  GEN:  no acute distress  CVS/CHEST: reg rate, nl rhythm, no murmurs/gallops/rubs  PULM: CTAB b/l no wheezes, crackles, or rhonchi   GI: NT/ND, no masses or organomegaly, soft, no guarding  BACK: no CVA tenderness  EXT: no LE edema, 2+ periph pulses in bilat radial and DP   NEURO: no focal deficits, no facial asymmetry, moving all extremities  PSYCH: AAOx3 answers questions appropriately  ED Course & MDM   Diagnoses as of 05/23/24 1940   Gross hematuria       Medical Decision Making  This is an 84-year-old female with past medical history of type 2 diabetes, hypertension, upper thyroidism, hyperlipidemia, NSTEMI, heart failure reduced ejection fraction, COPD on O2 at baseline presenting to the emergency department for hematuria.  Patient stable upon presentation to the emergency department, no acute distress and vitals are unremarkable.  On exam she is denying any active symptoms.  Abdomen is nontender.  She is no CVA tenderness.  Patient was discussed with her primary care physician who did confirm concern for dropping hemoglobin and worsening kidney function.  We will repeat these labs today for further evaluation.  Patient still urinating largely without any difficulty though urine collected here is dark red.  EMR was reviewed and patient's most recent hemoglobin was in the sevens.  Repeat lab work here today shows a stable hemoglobin but worsening kidney function.  Urine with blood but no signs of infection.  Results were discussed with her primary care physician Dr. Spring who will admit for further workup and potential urology evaluation.     Procedure  Procedures     Rene Romero MD  05/23/24 1942

## 2024-05-23 NOTE — H&P
History Of Present Illness  Lupe Oshea is a 84 y.o. female with a past medical history of hypertension, hyperlipidemia, hypothyroidism, T2DM, CKD stage III, CAD, NSTEMI who presents to [place] ED for hematuria.  Patient reports the hematuria began last night, it is bright red and has some clots today.  Reports dysuria associated with the blood.  Son was at bedside to aid with history.  Patient was found to have a bladder mass concerning for malignancy on renal ultrasound previously.  Reports that they saw  urology back in January with plans of cystoscopy and has been cleared by her cardiologist however nothing is scheduled.  Denies any lightheadedness, dizziness, chest pain, shortness of breath, abdominal pain.  Patient is on 1 L nasal cannula oxygen as needed at home.  Patient was sent to the ED by her PCP  In the ED, creatinine 1.77 (baseline 1.3-1.5), urinalysis shows RBC greater than 20, 3+ blood and urine.     Past Medical History  As above    Surgical History  Left heart cath     Social History  Former smoker, quit smoking cigarettes in January.  Denies alcohol or drug use.    Family History  No family history on file.     Allergies  Patient has no known allergies.    Review of Systems  10 point review of systems performed and negative except as stated above    Physical Exam  General:  Pleasant and cooperative. No apparent distress.  HEENT:  Normocephalic, atraumatic, mucus membranes moist.   Neck:  Trachea midline.  No JVD.    Chest:  Clear to auscultation bilaterally. No wheezes, rales, or rhonchi.  CV:  Regular rate and rhythm.  Positive S1/S2.   Abdomen: Bowel sounds present in all four quadrants, abdomen is soft, non-tender, non-distended.  Extremities:  No lower extremity edema or cyanosis.   Neurological:  AAOx3. No focal deficits.  Skin:  Warm and dry.    Last Recorded Vitals  /50 (BP Location: Right arm, Patient Position: Sitting)   Pulse 71   Temp 37.1 °C (98.8 °F) (Temporal)   Resp  18   Wt 54.4 kg (120 lb)   SpO2 95%       Assessment/Plan   Principal Problem:    Hematuria  This is a 89-year-old female with a past medical history of hypertension, hyperlipidemia, hypothyroidism, T2DM, CKD stage III, CAD, NSTEMI who presents to [place] ED for hematuria.  Patient reports the hematuria began last night, it is bright red and has some clots today.  Reports dysuria associated with the blood.     #Hematuria  #Bladder mass  -Patient has hematuria that began yesterday however asymptomatic otherwise and hemoglobin is stable, will continue to observe  Urology consulted    Chronic:  CAD: Continue home aspirin, Plavix, Statin  Hypertension: Continue home lisinopril  CKD: Continue home lisinopril  Hypothyroidism: Continue home levothyroxine    DVT prophylaxis: SCDs  CODE STATUS: DNR/DNI    Enoc Horvath DO

## 2024-05-24 ENCOUNTER — APPOINTMENT (OUTPATIENT)
Dept: RADIOLOGY | Facility: HOSPITAL | Age: 84
DRG: 669 | End: 2024-05-24
Payer: MEDICARE

## 2024-05-24 LAB
ANION GAP SERPL CALC-SCNC: 12 MMOL/L (ref 10–20)
BLOOD EXPIRATION DATE: NORMAL
BLOOD EXPIRATION DATE: NORMAL
BUN SERPL-MCNC: 53 MG/DL (ref 6–23)
CALCIUM SERPL-MCNC: 8.6 MG/DL (ref 8.6–10.3)
CHLORIDE SERPL-SCNC: 107 MMOL/L (ref 98–107)
CHLORIDE UR-SCNC: 59 MMOL/L
CHLORIDE/CREATININE (MMOL/G) IN URINE: 86 MMOL/G CREAT (ref 38–318)
CO2 SERPL-SCNC: 23 MMOL/L (ref 21–32)
CREAT SERPL-MCNC: 1.53 MG/DL (ref 0.5–1.05)
CREAT UR-MCNC: 69 MG/DL (ref 20–320)
CREAT UR-MCNC: 69 MG/DL (ref 20–320)
DISPENSE STATUS: NORMAL
DISPENSE STATUS: NORMAL
EGFRCR SERPLBLD CKD-EPI 2021: 33 ML/MIN/1.73M*2
ERYTHROCYTE [DISTWIDTH] IN BLOOD BY AUTOMATED COUNT: 14.5 % (ref 11.5–14.5)
ERYTHROCYTE [DISTWIDTH] IN BLOOD BY AUTOMATED COUNT: 14.6 % (ref 11.5–14.5)
ERYTHROCYTE [DISTWIDTH] IN BLOOD BY AUTOMATED COUNT: 15.7 % (ref 11.5–14.5)
GLUCOSE BLD MANUAL STRIP-MCNC: 106 MG/DL (ref 74–99)
GLUCOSE BLD MANUAL STRIP-MCNC: 183 MG/DL (ref 74–99)
GLUCOSE BLD MANUAL STRIP-MCNC: 223 MG/DL (ref 74–99)
GLUCOSE BLD MANUAL STRIP-MCNC: 77 MG/DL (ref 74–99)
GLUCOSE SERPL-MCNC: 79 MG/DL (ref 74–99)
HCT VFR BLD AUTO: 20.3 % (ref 36–46)
HCT VFR BLD AUTO: 22.4 % (ref 36–46)
HCT VFR BLD AUTO: 29.2 % (ref 36–46)
HGB BLD-MCNC: 6 G/DL (ref 12–16)
HGB BLD-MCNC: 6.6 G/DL (ref 12–16)
HGB BLD-MCNC: 9.5 G/DL (ref 12–16)
HOLD SPECIMEN: NORMAL
MCH RBC QN AUTO: 28.8 PG (ref 26–34)
MCH RBC QN AUTO: 28.9 PG (ref 26–34)
MCH RBC QN AUTO: 30.2 PG (ref 26–34)
MCHC RBC AUTO-ENTMCNC: 29.5 G/DL (ref 32–36)
MCHC RBC AUTO-ENTMCNC: 29.6 G/DL (ref 32–36)
MCHC RBC AUTO-ENTMCNC: 32.5 G/DL (ref 32–36)
MCV RBC AUTO: 93 FL (ref 80–100)
MCV RBC AUTO: 98 FL (ref 80–100)
MCV RBC AUTO: 98 FL (ref 80–100)
NRBC BLD-RTO: 0 /100 WBCS (ref 0–0)
PLATELET # BLD AUTO: 282 X10*3/UL (ref 150–450)
PLATELET # BLD AUTO: 289 X10*3/UL (ref 150–450)
PLATELET # BLD AUTO: 293 X10*3/UL (ref 150–450)
POTASSIUM SERPL-SCNC: 4.2 MMOL/L (ref 3.5–5.3)
POTASSIUM UR-SCNC: 20 MMOL/L
POTASSIUM/CREAT UR-RTO: 29 MMOL/G CREAT
PRODUCT BLOOD TYPE: 5100
PRODUCT BLOOD TYPE: 5100
PRODUCT CODE: NORMAL
PRODUCT CODE: NORMAL
RBC # BLD AUTO: 2.08 X10*6/UL (ref 4–5.2)
RBC # BLD AUTO: 2.28 X10*6/UL (ref 4–5.2)
RBC # BLD AUTO: 3.15 X10*6/UL (ref 4–5.2)
SODIUM SERPL-SCNC: 138 MMOL/L (ref 136–145)
SODIUM UR-SCNC: 69 MMOL/L
SODIUM/CREAT UR-RTO: 100 MMOL/G CREAT
UNIT ABO: NORMAL
UNIT ABO: NORMAL
UNIT NUMBER: NORMAL
UNIT NUMBER: NORMAL
UNIT RH: NORMAL
UNIT RH: NORMAL
UNIT VOLUME: 350
UNIT VOLUME: 350
WBC # BLD AUTO: 6.5 X10*3/UL (ref 4.4–11.3)
WBC # BLD AUTO: 6.9 X10*3/UL (ref 4.4–11.3)
WBC # BLD AUTO: 6.9 X10*3/UL (ref 4.4–11.3)
XM INTEP: NORMAL
XM INTEP: NORMAL

## 2024-05-24 PROCEDURE — 2500000004 HC RX 250 GENERAL PHARMACY W/ HCPCS (ALT 636 FOR OP/ED)

## 2024-05-24 PROCEDURE — 76770 US EXAM ABDO BACK WALL COMP: CPT

## 2024-05-24 PROCEDURE — 36415 COLL VENOUS BLD VENIPUNCTURE: CPT

## 2024-05-24 PROCEDURE — 76770 US EXAM ABDO BACK WALL COMP: CPT | Performed by: RADIOLOGY

## 2024-05-24 PROCEDURE — 99232 SBSQ HOSP IP/OBS MODERATE 35: CPT

## 2024-05-24 PROCEDURE — 85027 COMPLETE CBC AUTOMATED: CPT

## 2024-05-24 PROCEDURE — 97161 PT EVAL LOW COMPLEX 20 MIN: CPT | Mod: GP

## 2024-05-24 PROCEDURE — 99223 1ST HOSP IP/OBS HIGH 75: CPT | Performed by: INTERNAL MEDICINE

## 2024-05-24 PROCEDURE — 80048 BASIC METABOLIC PNL TOTAL CA: CPT

## 2024-05-24 PROCEDURE — 85027 COMPLETE CBC AUTOMATED: CPT | Mod: 91

## 2024-05-24 PROCEDURE — 97165 OT EVAL LOW COMPLEX 30 MIN: CPT | Mod: GO

## 2024-05-24 PROCEDURE — 2500000002 HC RX 250 W HCPCS SELF ADMINISTERED DRUGS (ALT 637 FOR MEDICARE OP, ALT 636 FOR OP/ED)

## 2024-05-24 PROCEDURE — 82947 ASSAY GLUCOSE BLOOD QUANT: CPT

## 2024-05-24 PROCEDURE — 36430 TRANSFUSION BLD/BLD COMPNT: CPT

## 2024-05-24 PROCEDURE — P9016 RBC LEUKOCYTES REDUCED: HCPCS

## 2024-05-24 PROCEDURE — 2500000001 HC RX 250 WO HCPCS SELF ADMINISTERED DRUGS (ALT 637 FOR MEDICARE OP)

## 2024-05-24 PROCEDURE — G0378 HOSPITAL OBSERVATION PER HR: HCPCS

## 2024-05-24 RX ORDER — INSULIN LISPRO 100 [IU]/ML
0-5 INJECTION, SOLUTION INTRAVENOUS; SUBCUTANEOUS
Status: DISCONTINUED | OUTPATIENT
Start: 2024-05-24 | End: 2024-05-29 | Stop reason: HOSPADM

## 2024-05-24 RX ORDER — CEFTRIAXONE 1 G/50ML
1 INJECTION, SOLUTION INTRAVENOUS EVERY 24 HOURS
Status: DISCONTINUED | OUTPATIENT
Start: 2024-05-24 | End: 2024-05-26

## 2024-05-24 RX ORDER — DEXTROSE 50 % IN WATER (D50W) INTRAVENOUS SYRINGE
25
Status: DISCONTINUED | OUTPATIENT
Start: 2024-05-24 | End: 2024-05-29 | Stop reason: HOSPADM

## 2024-05-24 RX ORDER — SODIUM CHLORIDE, SODIUM LACTATE, POTASSIUM CHLORIDE, CALCIUM CHLORIDE 600; 310; 30; 20 MG/100ML; MG/100ML; MG/100ML; MG/100ML
75 INJECTION, SOLUTION INTRAVENOUS CONTINUOUS
Status: DISCONTINUED | OUTPATIENT
Start: 2024-05-24 | End: 2024-05-24

## 2024-05-24 RX ORDER — DEXTROSE 50 % IN WATER (D50W) INTRAVENOUS SYRINGE
12.5
Status: DISCONTINUED | OUTPATIENT
Start: 2024-05-24 | End: 2024-05-29 | Stop reason: HOSPADM

## 2024-05-24 RX ORDER — POLYETHYLENE GLYCOL 3350 17 G/17G
17 POWDER, FOR SOLUTION ORAL DAILY
Status: DISCONTINUED | OUTPATIENT
Start: 2024-05-24 | End: 2024-05-29 | Stop reason: HOSPADM

## 2024-05-24 RX ADMIN — INSULIN LISPRO 2 UNITS: 100 INJECTION, SOLUTION INTRAVENOUS; SUBCUTANEOUS at 12:54

## 2024-05-24 RX ADMIN — CEFTRIAXONE SODIUM 1 G: 1 INJECTION, SOLUTION INTRAVENOUS at 17:36

## 2024-05-24 RX ADMIN — LEVOTHYROXINE SODIUM 75 MCG: 0.07 TABLET ORAL at 06:32

## 2024-05-24 ASSESSMENT — ENCOUNTER SYMPTOMS
DIFFICULTY URINATING: 0
HEMATURIA: 1
CHILLS: 0
FEVER: 0
FREQUENCY: 1
FLANK PAIN: 0

## 2024-05-24 ASSESSMENT — COGNITIVE AND FUNCTIONAL STATUS - GENERAL
DAILY ACTIVITIY SCORE: 24
MOBILITY SCORE: 24

## 2024-05-24 ASSESSMENT — PAIN SCALES - GENERAL
PAINLEVEL_OUTOF10: 0 - NO PAIN

## 2024-05-24 ASSESSMENT — PAIN - FUNCTIONAL ASSESSMENT
PAIN_FUNCTIONAL_ASSESSMENT: 0-10
PAIN_FUNCTIONAL_ASSESSMENT: 0-10

## 2024-05-24 NOTE — PROGRESS NOTES
Physical Therapy    Physical Therapy Evaluation    Patient Name: Lupe Oshea  MRN: [id number]  Today's Date: 5/24/2024   Time Calculation  Start Time: 0915  Stop Time: 0930  Time Calculation (min): 15 min    Assessment/Plan   PT Assessment  End of Session Communication: Bedside nurse, Care Coordinator  End of Session Patient Position: Bed, 2 rail up, Alarm off, not on at start of session  IP OR SWING BED PT PLAN  Inpatient or Swing Bed: Inpatient         Subjective   General Visit Information:  General  Reason for Referral: UTI,bladder CA,hematuria  Referred By: Rolando  Past Medical History Relevant to Rehab: HTN,HLD,COPD,DM,CAD,CKD,[place],NSTEMI  Prior to Session Communication: Bedside nurse  Patient Position Received: Bed, 2 rail up, Alarm off, not on at start of session  Home Living:  Home Living  Type of Home: House  Lives With: Adult children  Home Adaptive Equipment: None  Home Layout: One level  Home Access: Stairs to enter with rails (3 stairs)  Prior Level of Function:  Prior Function Per Pt/Caregiver Report  Level of [place]: Independent with ADLs and functional transfers, Needs assistance with homemaking  Ambulatory Assistance: Independent  Precautions:  Precautions  Medical Precautions: Cardiac precautions, Oxygen therapy device and L/min (3L NC)  Vital Signs:       Objective   Pain:  Pain Assessment  Pain Score: 0 - No pain  Cognition:       General Assessments:    Functional Assessments:  Bed Mobility  Bed Mobility:  (Ind)    Transfers  Transfer:  (Ind)    Ambulation/Gait Training  Ambulation/Gait Training Performed:  (Ind, no device 50 ft, no lob noted)  Extremity/Trunk Assessments:    Outcome Measures:  [facility] Basic Mobility  Turning from your back to your side while in a flat bed without using bedrails: None  Moving from lying on your back to sitting on the side of a flat bed without using bedrails: None  Moving to and from bed to chair (including a wheelchair): None  Standing up from a  chair using your arms (e.g. wheelchair or bedside chair): None  To walk in hospital room: None  Climbing 3-5 steps with railing: None  Basic Mobility - Total Score: 24    Encounter Problems       Encounter Problems (Active)       Pain - Adult              Education Documentation  No documentation found.  Education Comments  No comments found.

## 2024-05-24 NOTE — CONSULTS
Reason For Consult  Gross hematuria, Bladder mass    History Of Present Illness  Lupe Oshea is a 84 y.o. female presenting with hematuria. Patient known to our group. Has a large bladder mass and extensive smoking hx. Was admitted to the hospital in January underwent cardiac catheterization, was found to have total occlusion of LAD with contralateral vessels. Has been on ASA and Plavix since. Our group has been trying to get cardiac clearance from cardiology to undergo TURBT but this is still pending. Upon consultation today, admits to urinary frequency with gross blood in her urine. No flank or SP pain. She was instructed to stop ASA in 5/22, she has remained on her Plavix. Hgb this morning 6.0.     Past Medical History  She has a past medical history of Personal history of other diseases of the circulatory system, Personal history of other endocrine, nutritional and metabolic disease, and Personal history of other endocrine, nutritional and metabolic disease.    Surgical History  She has a past surgical history that includes Other surgical history (07/13/2022) and Cardiac catheterization (N/A, 1/19/2024).     Social History  She reports that she has quit smoking. Her smoking use included cigarettes. She has been exposed to tobacco smoke. She has never used smokeless tobacco. She reports that she does not drink alcohol and does not use drugs.    Family History  No family history on file.     Allergies  Patient has no known allergies.    Review of Systems   Constitutional:  Negative for chills and fever.   Genitourinary:  Positive for frequency and hematuria. Negative for difficulty urinating and flank pain.         Physical Exam  No distress      Current Facility-Administered Medications:     [Held by provider] atorvastatin (Lipitor) tablet 40 mg, 40 mg, oral, Nightly, Enoc Horvath DO, 40 mg at 05/23/24 2139    [Held by provider] clopidogrel (Plavix) tablet 75 mg, 75 mg, oral, Daily, Enoc Horvath DO     dextrose 50 % injection 12.5 g, 12.5 g, intravenous, q15 min PRN, Prakash Marshall DO    dextrose 50 % injection 25 g, 25 g, intravenous, q15 min PRN, Prakash Marshall DO    [Held by provider] furosemide (Lasix) tablet 20 mg, 20 mg, oral, Daily, Enoc Horvath,     glucagon (Glucagen) injection 1 mg, 1 mg, intramuscular, q15 min PRN, Prakash Marshall DO    glucagon (Glucagen) injection 1 mg, 1 mg, intramuscular, q15 min PRN, Prakash Marshall DO    insulin lispro (HumaLOG) injection 0-5 Units, 0-5 Units, subcutaneous, TID, Prakash Marshall DO    levothyroxine (Synthroid, Levoxyl) tablet 75 mcg, 75 mcg, oral, Daily before breakfast, Enoc Horvath DO, 75 mcg at 05/24/24 0632    [Held by provider] lisinopril tablet 2.5 mg, 2.5 mg, oral, Daily, Enoc Horvath DO    polyethylene glycol (Glycolax, Miralax) packet 17 g, 17 g, oral, Daily, Prakash Marshall DO     Last Recorded Vitals  Blood pressure 115/53, pulse 57, temperature 36.4 °C (97.5 °F), resp. rate 18, height 1.524 m (5'), weight 54.4 kg (120 lb), SpO2 98%.    Relevant Results  Results for orders placed or performed during the hospital encounter of 05/23/24 (from the past 96 hour(s))   Urinalysis with Reflex Culture and Microscopic   Result Value Ref Range    Color, Urine Red (N) Straw, Yellow    Appearance, Urine Turbid (N) Clear    Specific Gravity, Urine >1.030 (N) 1.005 - 1.035    pH, Urine 6.5 5.0, 5.5, 6.0, 6.5, 7.0, 7.5, 8.0    Protein, Urine 100 (2+) (A) NEGATIVE, 10 (TRACE) mg/dL    Glucose, Urine NEGATIVE NEGATIVE mg/dL    Blood, Urine 1.0 (3+) (A) NEGATIVE    Ketones, Urine NEGATIVE NEGATIVE mg/dL    Bilirubin, Urine NEGATIVE NEGATIVE    Urobilinogen, Urine NORM NORM mg/dL    Nitrite, Urine NEGATIVE NEGATIVE    Leukocyte Esterase, Urine 250 Wendy/µL (A) NEGATIVE   Extra Urine Gray Tube   Result Value Ref Range    Extra Tube Hold for add-ons.    Microscopic Only, Urine   Result Value Ref Range    WBC, Urine 1-5 1-5, NONE /HPF    RBC, Urine >20 (A) NONE,  1-2, 3-5 /HPF    Squamous Epithelial Cells, Urine 1-9 (SPARSE) Reference range not established. /HPF   Urine electrolytes   Result Value Ref Range    Sodium, Urine Random 69 mmol/L    Sodium/Creatinine Ratio 100 Not established. mmol/g Creat    Potassium, Urine Random 20 mmol/L    Potassium/Creatinine Ratio 29 Not established mmol/g Creat    Chloride, Urine Random 59 mmol/L    Chloride/Creatinine Ratio 86 38 - 318 mmol/g creat    Creatinine, Urine Random 69.0 20.0 - 320.0 mg/dL   Creatinine, Urine Random   Result Value Ref Range    Creatinine, Urine Random 69.0 20.0 - 320.0 mg/dL   CBC and Auto Differential   Result Value Ref Range    WBC 9.4 4.4 - 11.3 x10*3/uL    nRBC 0.0 0.0 - 0.0 /100 WBCs    RBC 2.63 (L) 4.00 - 5.20 x10*6/uL    Hemoglobin 7.8 (L) 12.0 - 16.0 g/dL    Hematocrit 25.7 (L) 36.0 - 46.0 %    MCV 98 80 - 100 fL    MCH 29.7 26.0 - 34.0 pg    MCHC 30.4 (L) 32.0 - 36.0 g/dL    RDW 14.5 11.5 - 14.5 %    Platelets 325 150 - 450 x10*3/uL    Neutrophils % 78.4 40.0 - 80.0 %    Immature Granulocytes %, Automated 0.4 0.0 - 0.9 %    Lymphocytes % 10.2 13.0 - 44.0 %    Monocytes % 7.5 2.0 - 10.0 %    Eosinophils % 2.6 0.0 - 6.0 %    Basophils % 0.9 0.0 - 2.0 %    Neutrophils Absolute 7.38 (H) 1.60 - 5.50 x10*3/uL    Immature Granulocytes Absolute, Automated 0.04 0.00 - 0.50 x10*3/uL    Lymphocytes Absolute 0.96 0.80 - 3.00 x10*3/uL    Monocytes Absolute 0.71 0.05 - 0.80 x10*3/uL    Eosinophils Absolute 0.24 0.00 - 0.40 x10*3/uL    Basophils Absolute 0.08 0.00 - 0.10 x10*3/uL   Comprehensive metabolic panel   Result Value Ref Range    Glucose 112 (H) 74 - 99 mg/dL    Sodium 139 136 - 145 mmol/L    Potassium 4.8 3.5 - 5.3 mmol/L    Chloride 105 98 - 107 mmol/L    Bicarbonate 24 21 - 32 mmol/L    Anion Gap 15 10 - 20 mmol/L    Urea Nitrogen 55 (H) 6 - 23 mg/dL    Creatinine 1.77 (H) 0.50 - 1.05 mg/dL    eGFR 28 (L) >60 mL/min/1.73m*2    Calcium 9.2 8.6 - 10.3 mg/dL    Albumin 4.0 3.4 - 5.0 g/dL    Alkaline  Phosphatase 86 33 - 136 U/L    Total Protein 6.8 6.4 - 8.2 g/dL    AST 13 9 - 39 U/L    Bilirubin, Total 0.3 0.0 - 1.2 mg/dL    ALT 8 7 - 45 U/L   Type and Screen   Result Value Ref Range    ABO TYPE O     Rh TYPE POS     ANTIBODY SCREEN NEG    Coagulation Screen   Result Value Ref Range    Protime 12.8 9.8 - 12.8 seconds    INR 1.1 0.9 - 1.1    aPTT 35 27 - 38 seconds   CBC   Result Value Ref Range    WBC 6.9 4.4 - 11.3 x10*3/uL    nRBC 0.0 0.0 - 0.0 /100 WBCs    RBC 2.08 (L) 4.00 - 5.20 x10*6/uL    Hemoglobin 6.0 (LL) 12.0 - 16.0 g/dL    Hematocrit 20.3 (L) 36.0 - 46.0 %    MCV 98 80 - 100 fL    MCH 28.8 26.0 - 34.0 pg    MCHC 29.6 (L) 32.0 - 36.0 g/dL    RDW 14.5 11.5 - 14.5 %    Platelets 282 150 - 450 x10*3/uL   Basic metabolic panel   Result Value Ref Range    Glucose 79 74 - 99 mg/dL    Sodium 138 136 - 145 mmol/L    Potassium 4.2 3.5 - 5.3 mmol/L    Chloride 107 98 - 107 mmol/L    Bicarbonate 23 21 - 32 mmol/L    Anion Gap 12 10 - 20 mmol/L    Urea Nitrogen 53 (H) 6 - 23 mg/dL    Creatinine 1.53 (H) 0.50 - 1.05 mg/dL    eGFR 33 (L) >60 mL/min/1.73m*2    Calcium 8.6 8.6 - 10.3 mg/dL      No results found.      Assessment/Plan   Gross hematuria/Bladder mass/Acute on chronic blood loss anemia/LAUREN  -Avoid lai catheterization/CBI unless unable to void  -Recommend Cardiology consultation for surgical clearance  -No surgical intervention at this time  -Transfuse as needed  -Cr improving, Renal US pending    Thank you for allowing us to participate in this patient's care, we will follow.     Sukhjinder Gauthier PA-C

## 2024-05-24 NOTE — CONSULTS
Patient ID: : Lupe Oshea            Primary Care Provider: Migel Spring DO    REFERRING PHYSICIAN:  Dr. Migel Spring    REASON FOR CONSULT:  Bladder carcinoma    HISTORY OF PRESENT ILLNESS:  Lupe Oshea is a 84 y.o. female with known history of most probable bladder carcinoma.  She is currently admitted to the hospital with worsening hematuria.  As per patient's son she has been diagnosed with most probable bladder carcinoma with plan for surgery for finding of large bladder mass.  Apparently procedure has been delayed due to recent cardiac issues and her being on Plavix and aspirin.    INTERVAL HISTORY:  Patient denies any new complaints other than acute on chronic issues with hematuria and generalized weakness. No history of nausea, vomiting, fever, night sweats, diarrhea, rash, anorexia or weight loss. No recent changes in medications.    PAST MEDICAL HISTORY:  1.  Bladder cancer as detailed above.  2.  Hypertension  3.  Hyperlipidemia  3.  CHF/coronary artery disease.  History of recent NSTEMI in 2024  5.  Moderate aortic regurgitation  6.  Hypothyroidism  7.  Type 2 diabetes mellitus  8.  CKD stage III  9.  COPD on home O2  10.  Iron deficiency anemia    SOCIAL HISTORY:  She lives with her son in [address] [place] [address].  Quit smoking in 2024.  1 pack a day for 60 years prior smoking history.  Nonalcoholic.  She is a retired .  Born and raised in [place]    FAMILY HISTORY:  Parents  in old 80s from cancer.  Exact details are not available 1 sister and 1 son alive and well.  No other family history of bleeding, clotting or malignant disorders in the family history.    REVIEW OF SYSTEMS:   Pertinent finding as per the history above.  All other systems have been reviewed and generally negative and noncontributory.    VITALS:  BSA: 1.52 meters squared  /53   Pulse 65   Temp 37.1 °C (98.8 °F) (Temporal)   Resp 18   Ht 1.524 m (5')   Wt 54.4 kg (120 lb)    SpO2 100%   BMI 23.44 kg/m²     PHYSICAL EXAMINATION:  Detailed examination not done.    RELEVANT RESULTS:  CBC at the time of admission shows a hemoglobin of 6 with MCV of 98.  WBC and platelets were normal.  Metabolic profile is unremarkable other than creatinine of 1.7    ASSESSMENT / PLAN:  1.  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  Patient is well-known to urology service from previous workup.  There are plans for her to have cystoscopy and further evaluation for treatment and management of bladder carcinoma.  Apparently plans have been delayed due to her recent cardiac issues.    She is being followed by urology during current hospitalization with plan for probable surgery/procedure in the next few weeks.  Depending upon the planned urology evaluation and workup we will plan oncology management accordingly.    2.  Anemia.  Most likely secondary to ongoing persistent hematuria.  Possibility of primary clonal disorder is less likely.  There is a possibility of component of chronic renal disease.  WBC and platelets etc. are all normal.  For now we will recommend continuing with the supportive care with transfusion support as needed.    A total of 60 minutes were spent in evaluation - performing physical examination, history taking, review of the previous extensive records/information and formulating current and future management plan. Majority of the time was spend in consultation and discussion.    This note has been transcribed using Dragon voice recognition system and there is a possibility of unintentional typing misprints.       Ross Suarez MD

## 2024-05-24 NOTE — NURSING NOTE
1205: Started pts blood transfusion at 11:36am, pts bp after 10 minutes was 99/49 from previous 110/52. Paused transfusion and messaged Dr. Marshall, Rechecked bp 10 minutes after and it was 103/51 and was instructed to resume transfusion. Will continue to monitor.

## 2024-05-24 NOTE — CARE PLAN
The patient's goals for the shift include to be free from discomfort    The clinical goals for the shift include to prevent further injury

## 2024-05-24 NOTE — PROGRESS NOTES
24 1547   Discharge Planning   Living Arrangements Children   Support Systems Family members   Assistance Needed None   Type of Residence Private residence   Number of Stairs to Enter Residence 3   Number of Stairs Within Residence 12   Do you have animals or pets at home? Yes   Type of Animals or Pets dog   Who is requesting discharge planning? Provider   Home or Post Acute Services None   Patient expects to be discharged to: Home   Does the patient need discharge transport arranged? No     TCC Note: Met with pt and Son at bedside, introduced self and explained role on the Transition of Care Team. Verified name, , demographics, insurance, PCP is Dr. Casey. Pt's Son, Mj, ER contact phone: [phone], lives with pt. Pt was previously Independent prior to admission. Pt uses O2 1L NC at home and O2 3L NC while out in public. Pt also uses Home Nebulizer. Pharmacy is [facility] in [place]. Pt to have tentative surgery within the next few days. Will continue to follow case for more solid discharge plan after therapy notes are in. Patti López, MSN, RN, TCC.

## 2024-05-24 NOTE — PROGRESS NOTES
Medicine Progress Note    Subjective   NAD.  Patient endorsing urethral site burning/discomfort when passing her red urine, endorsing suprapubic tenderness upon palpation.  Denies flank pain, subjective fevers, chills, malaise, N/V, dark BMs, changes in stool pattern, other active bleeding, CP, respiratory distress.  Patient updated on her clinical status, anticipating a joint conversation with her and son Mj regarding her recent bladder CA diagnosis and whether they would want to pursue OP workup.  This is in the context of patient being DNR/DNI    -AM Hgb 6.0, patient consented, 2U PRBC ordered and transfusing  -Endorsing UTI symptoms with positive leukocyte esterase on UA  -Perioperative risk stratification:  Class III Risk per RCRI  Patient is at moderate risk but acceptable to proceed with surgery per regular cardiologist Dr. Krueger      A 10 point ROS was performed with the patient denying any complaint at this time aside from those listed in the HPI above.     Objective   Patient Vitals for the past 24 hrs:   BP Temp Temp src Pulse Resp SpO2 Height Weight   05/24/24 1147 (!) 99/49 36.4 °C (97.5 °F) Temporal 72 18 100 % -- --   05/24/24 1132 110/52 36.2 °C (97.2 °F) Temporal 71 18 100 % -- --   05/24/24 1127 110/52 36.2 °C (97.2 °F) Temporal 71 18 100 % -- --   05/24/24 0606 115/53 36.4 °C (97.5 °F) -- 57 -- 98 % -- --   05/24/24 0000 -- -- -- -- -- 98 % -- --   05/23/24 2139 130/54 36.8 °C (98.2 °F) -- 72 -- 97 % -- --   05/23/24 1533 108/50 37.1 °C (98.8 °F) Temporal 71 18 95 % 1.524 m (5') 54.4 kg (120 lb)      Labs, radiological imaging and cardiac work up were personally reviewed    Ventilator/Oxygen Supply:     Oxygen Therapy/Pulse Ox  Medical Gas Therapy: Supplemental oxygen  O2 Delivery Method: Nasal cannula  SpO2: 100 %  Patient Activity During SpO2 Measurement: At rest  Temp: 36.4 °C (97.5 °F)  Labs:   Results from last 7 days   Lab Units 05/24/24  0639 05/23/24  1702 05/20/24  1531   SODIUM  mmol/L 138 139 141   POTASSIUM mmol/L 4.2 4.8 4.7   CHLORIDE mmol/L 107 105 105   CO2 mmol/L 23 24 24   BUN mg/dL 53* 55* 55*   CREATININE mg/dL 1.53* 1.77* 1.69*   GLUCOSE mg/dL 79 112* 99   CALCIUM mg/dL 8.6 9.2 8.8     Results from last 7 days   Lab Units 05/24/24  1018   WBC AUTO x10*3/uL 6.5   HEMOGLOBIN g/dL 6.6*   HEMATOCRIT % 22.4*   PLATELETS AUTO x10*3/uL 289   Imaging:  No results found.  US renal complete   Final Result   1. Simple right renal cysts.   2. 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   3. Large polypoid mass in the bladder consistent with the patient's   known history of bladder carcinoma.        MACRO:   None        Signed by: Dior Roe 5/24/2024 9:05 AM   Dictation workstation:   [id number]        Medications:  Inpatient scheduled medications:  [Held by provider] atorvastatin, 40 mg, oral, Nightly  cefTRIAXone, 1 g, intravenous, q24h  [Held by provider] clopidogrel, 75 mg, oral, Daily  [Held by provider] furosemide, 20 mg, oral, Daily  insulin lispro, 0-5 Units, subcutaneous, TID  levothyroxine, 75 mcg, oral, Daily before breakfast  [Held by provider] lisinopril, 2.5 mg, oral, Daily  polyethylene glycol, 17 g, oral, Daily    Inpatient PRN medications:  PRN medications: dextrose, dextrose, glucagon, glucagon  Inpatient continuous medications:      Physical Exam:   GENERAL: Awake/alert, cooperative, conversant, NAD.  HEAD:  Normocephalic, atraumatic.  EYES:  Round and reactive. Anicteric.  ENT:  No nasal discharge, mucous membranes moist and pink.  NECK:  Atraumatic, no meningismus. No JVD, cervical lymphadenopathy bilaterally.  CARDIOVASCULAR: HRRR, noncyanotic.  RESPIRATORY: CTAB without wheezes, rhonchi, rales.  On 1 LNC, no acute respiratory distress.  ABDOMEN:  Soft, + suprapubic tenderness, nondistended, hypoactive bowel sounds.  EXTREMITIES:  No peripheral edema, no calf tenderness. Peripheral pulses intact. Scattered bruises.   SKIN:  Warm and dry. No tenting. No rash or  open wound noted.  NEUROLOGICAL:  Nonfocal grossly.  A&O x 4. CN II-XII grossly intact.    Assessment/Plan   Lupe Oshea is a 84 y.o. year old female with a PMHx of primary bladder CA, HTN, HLD, HFrEF (40 to 45% EF with multiple regional wall motion abnormalities and mild to moderate aortic valve regurgitation and recent echo) hypothyroidism, non-insulin-dependent T2DM, CKD stage III, CAD, recent NSTEMI (no stent placement, medically managed on DAPT), COPD on home O2 with chronic hypoxic respiratory failure, iron deficiency anemia, ambulatory dysfunction, former tobacco use, recent hospital admission for mechanical fall, confusion, PNA who presents to [facility] for hematuria.  Her PCP is Dr. Spring.      #Hematuria  #UTI/asymptomatic bacteriuria  #Primary bladder CA  #CKD III, questionable LAUREN   -Recent onset of hematuria (1 to 2 days ago), asymptomatic otherwise.  No endorsement of dysuria, suprapubic tenderness, flank pain, subjective fevers, chills, malaise, N/V, dark BMs, changes in stool pattern, other active bleeding, CP, respiratory distress-changed from baseline, bilateral calf pain  -Has been seeing OP urology at [facility] - was found to have a bladder mass concerning for malignancy on renal US previously.  CTAP 3/18/2024 demonstrated lobular bladder mass consistent with a primary bladder CA, no extension through the bladder wall, no pelvic or extra-pelvic lymphadenopathy or evidence of metastatic disease.  Patient reports of plans for cystoscopy, for which she has been cleared by her cardiologist (Dr. Krueger) for, however, nothing is scheduled yet.  Per chart review, cardiologist advised patient to discontinue aspirin on 5/22 and son was made aware    -Perioperative risk stratification:  Class III Risk per RCRI  Patient is at moderate risk but acceptable to proceed with surgery per regular cardiologist Dr. Krueger    -Hgb stable, continue trending for cell counts.  Keep type and screen active.   Transfuse for Hgb <7 with PRBC units.  Defer Venofer infusion for now.  2 units PRBC ordered for transfusion today D/T AM Hgb 6  -Creatinine on admission 1.77 as low as 1.09 in January earlier this year.  Trend renal function labs and UOP, avoid nephrotoxins.  Urine electrolytes and urine creatinine ordered.  Renal US ordered.  Indeterminate FENa 1.1%.  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  Large polypoid mass in the bladder consistent with the patient's known history of bladder CA  -Urology consulted, appreciate recs  Avoid lai catheterization/CBI unless unable to void  Recommending Cardiology consultation for surgical clearance  No surgical intervention at this time     Chronic:  CAD and recent NSTEMI without stenting: Holding Plavix, continue statin  Hypertension and CKD III: Holding home lisinopril  Hypothyroidism: Continue home levothyroxine  HFrEF: Holding home Lasix 20 mg daily  Non-insulin-dependent T2DM: Hold home metformin, SSI mild  Tobacco use disorder, former smoker  Iron deficiency anemia  Ambulatory dysfunction    Checklist:  Antimicrobials: -  Oxygen: 1 LNC  Feeding: Adult diet regular  Fluids: Defer  DVT ppx: Plavix, SCDs  Glycemic control: SSI mild  Bowel care: MiraLAX  LDAs: PIV  Code Status: DNR and No Intubation    Prakash Marshall, DO   Internal Medicine PGY-1    This is a preliminary note written by the resident. Please wait for attending addendum for finalization of note and recommendations.

## 2024-05-24 NOTE — PROGRESS NOTES
Occupational Therapy    Evaluation    Patient Name: Lupe Oshea  MRN: [id number]  Today's Date: 5/24/2024  Time Calculation  Start Time: 0914  Stop Time: 0930  Time Calculation (min): 16 min    Assessment  IP OT Assessment  OT Assessment: Patient reports no concerns for managing ADLs, transfers & mobility at home; reports son able to assist as needed.  End of Session Communication: Bedside nurse  End of Session Patient Position: Bed, 1 rail up, Alarm off, not on at start of session (call light in reach)    Plan:  No Skilled OT: Independent with ADLs  OT Discharge Recommendations: No further acute OT, No OT needed after discharge  OT - OK to Discharge: Yes (from an O.T. standpoint)    Subjective     Current Problem:  Patient Active Problem List   Diagnosis    Bursitis of shoulder    Change in pigmented skin lesion    Type 2 diabetes mellitus (Multi)    Benign essential hypertension    External hemorrhoid    Fatigue    Hearing loss    Hypercholesterolemia    Hypothyroidism    Memory loss    Lipoma of back    Impacted cerumen of left ear    Psoriasis    Multiple benign nevi    Strain of shoulder    Subcutaneous mass    Acute exacerbation of chronic obstructive pulmonary disease (Multi)    Acute respiratory failure (Multi)    Chronic total occlusion of coronary artery    Congestive heart failure (Multi)    Deep vein thrombosis (DVT) of lower extremity (Multi)    Swelling of lower extremity    Ventricular bigeminy    Changing skin lesion    External hemorrhoids    Amnesia    Multiple benign melanocytic nevi    Hematuria       General:  General  Reason for Referral: OT eval & treat (Hematuria, UTI, bladder CA)  Referred By: Prakash Marshall  Past Medical History Relevant to Rehab: 5/23/24: blood in urine, recent fall   PMH: DM, CAD, CKD, COPD, HTN, HLD, hypothyroidism, NSTEMI,  Prior to Session Communication: Bedside nurse  Patient Position Received: Bed, 2 rail up  General Comment: Per conference with RN, patient  is medically stable for therapy eval    Precautions:  Precautions Comment: 3 L O2, hearing impairment      Pain:  Pain Assessment  Pain Assessment: 0-10  Pain Score: 0 - No pain    Objective     Cognition:  Overall Cognitive Status: Within Functional Limits        Home Living:  Home Living Comments: Lives with son who works from home/able to assist prn. Bed/bath 1st floor, independent ADLs, transfers & mobility without device. Shared IADLs with son, son manages laundry in basement, patient does not drive. Uses tub/shower with grab bar, standard height toilet.     ADL:  Grooming Assistance: Independent  UE Dressing Assistance: Independent  LE Dressing Assistance: Independent  Toileting Assistance with Device: Independent    Activity Tolerance:  Endurance:  (Patient refused to wear O2 into bathroom, slight SOB noted afterwards, SPO2 noted to be 83%; patient was placed back on 3 L O2,  SPO2 recovered to 93% within 30 seconds; RN notified.)    Bed Mobility/Transfers:   Bed Mobility  Bed Mobility:  (independent supine <-> sit)  Transfers  Transfer:  (independent to/from bed and commode with grab bar)    Ambulation/Gait Training:  Functional Mobility  Functional Mobility Performed:  (supervision-independent without device; no LOB noted)    Sitting Balance:  Dynamic Sitting Balance  Dynamic Sitting-Balance:  (independent)    Standing Balance:  Dynamic Standing Balance  Dynamic Standing-Balance:  (independent)    Sensation:  Sensation Comment: denies numbness/tingling    Strength:  Strength Comments: BUE WFL    Coordination:  Movements are Fluid and Coordinated: Yes       Outcome Measures: [facility] Daily Activity  Putting on and taking off regular lower body clothing: None  Bathing (including washing, rinsing, drying): None  Putting on and taking off regular upper body clothing: None  Toileting, which includes using toilet, bedpan or urinal: None  Taking care of personal grooming such as brushing teeth: None  Eating Meals:  None  Daily Activity - Total Score: 24              EDUCATION:     Education Documentation  ADL Training, taught by Enedina Ceballos OT at 5/24/2024 11:35 AM.  Learner: Patient  Readiness: Acceptance  Method: Explanation  Response: Verbalizes Understanding    Education Comments  No comments found.

## 2024-05-24 NOTE — PROGRESS NOTES
Medicine Progress Note    Subjective   NAD.  Patient endorsing urethral site burning/discomfort when passing her red urine, endorsing suprapubic tenderness upon palpation.  Denies flank pain, subjective fevers, chills, malaise, N/V, dark BMs, changes in stool pattern, other active bleeding, CP, respiratory distress.  Patient updated on her clinical status, anticipating a joint conversation with her and son Mj regarding her recent bladder CA diagnosis and whether they would want to pursue OP workup.  This is in the context of patient being DNR/DNI    -AM Hgb 6.0, patient consented, 2U PRBC ordered and transfusing  -Endorsing UTI symptoms with positive leukocyte esterase on UA      A 10 point ROS was performed with the patient denying any complaint at this time aside from those listed in the HPI above.     Objective   Patient Vitals for the past 24 hrs:   BP Temp Temp src Pulse Resp SpO2 Height Weight   05/24/24 0606 115/53 36.4 °C (97.5 °F) -- 57 -- 98 % -- --   05/24/24 0000 -- -- -- -- -- 98 % -- --   05/23/24 2139 130/54 36.8 °C (98.2 °F) -- 72 -- 97 % -- --   05/23/24 1533 108/50 37.1 °C (98.8 °F) Temporal 71 18 95 % 1.524 m (5') 54.4 kg (120 lb)      Labs, radiological imaging and cardiac work up were personally reviewed    Ventilator/Oxygen Supply:     Oxygen Therapy/Pulse Ox  Medical Gas Therapy: Supplemental oxygen  O2 Delivery Method: Nasal cannula  SpO2: 98 %  Patient Activity During SpO2 Measurement: At rest  Temp: 36.4 °C (97.5 °F)  Labs:   Results from last 7 days   Lab Units 05/24/24  0639 05/23/24  1702 05/20/24  1531   SODIUM mmol/L 138 139 141   POTASSIUM mmol/L 4.2 4.8 4.7   CHLORIDE mmol/L 107 105 105   CO2 mmol/L 23 24 24   BUN mg/dL 53* 55* 55*   CREATININE mg/dL 1.53* 1.77* 1.69*   GLUCOSE mg/dL 79 112* 99   CALCIUM mg/dL 8.6 9.2 8.8     Results from last 7 days   Lab Units 05/24/24  0639   WBC AUTO x10*3/uL 6.9   HEMOGLOBIN g/dL 6.0*   HEMATOCRIT % 20.3*   PLATELETS AUTO x10*3/uL 282    Imaging:  No results found.  US renal complete   Final Result   1. Simple right renal cysts.   2. 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   3. Large polypoid mass in the bladder consistent with the patient's   known history of bladder carcinoma.        MACRO:   None        Signed by: Dior Roe 5/24/2024 9:05 AM   Dictation workstation:   [id number]        Medications:  Inpatient scheduled medications:  [Held by provider] atorvastatin, 40 mg, oral, Nightly  [Held by provider] clopidogrel, 75 mg, oral, Daily  [Held by provider] furosemide, 20 mg, oral, Daily  insulin lispro, 0-5 Units, subcutaneous, TID  levothyroxine, 75 mcg, oral, Daily before breakfast  [Held by provider] lisinopril, 2.5 mg, oral, Daily  polyethylene glycol, 17 g, oral, Daily    Inpatient PRN medications:  PRN medications: dextrose, dextrose, glucagon, glucagon  Inpatient continuous medications:      Physical Exam:   GENERAL: Awake/alert, cooperative, conversant, NAD.  HEAD:  Normocephalic, atraumatic.  EYES:  Round and reactive. Anicteric.  ENT:  No nasal discharge, mucous membranes moist and pink.  NECK:  Atraumatic, no meningismus. No JVD, cervical lymphadenopathy bilaterally.  CARDIOVASCULAR: HRRR, noncyanotic.  RESPIRATORY: CTAB without wheezes, rhonchi, rales.  On 1 LNC, no acute respiratory distress.  ABDOMEN:  Soft, + suprapubic tenderness, nondistended, hypoactive bowel sounds.  EXTREMITIES:  No peripheral edema, no calf tenderness. Peripheral pulses intact. Scattered bruises.   SKIN:  Warm and dry. No tenting. No rash or open wound noted.  NEUROLOGICAL:  Nonfocal grossly.  A&O x 4. CN II-XII grossly intact.    Assessment/Plan   Lupe Oshea is a 84 y.o. year old female with a PMHx of primary bladder CA, HTN, HLD, HFrEF (40 to 45% EF with multiple regional wall motion abnormalities and mild to moderate aortic valve regurgitation and recent echo) hypothyroidism, non-insulin-dependent T2DM, CKD stage III, CAD, recent  NSTEMI (no stent placement, medically managed on DAPT), COPD on home O2 with chronic hypoxic respiratory failure, iron deficiency anemia, ambulatory dysfunction, former tobacco use, recent hospital admission for mechanical fall, confusion, PNA who presents to [facility] for hematuria.  Her PCP is Dr. Spring.      #Hematuria  #UTI/asymptomatic bacteriuria  #Primary bladder CA  #CKD III, questionable LAUREN   -Recent onset of hematuria (1 to 2 days ago), asymptomatic otherwise.  No endorsement of dysuria, suprapubic tenderness, flank pain, subjective fevers, chills, malaise, N/V, dark BMs, changes in stool pattern, other active bleeding, CP, respiratory distress-changed from baseline, bilateral calf pain  -Has been seeing OP urology at [facility] - was found to have a bladder mass concerning for malignancy on renal US previously.  CTAP 3/18/2024 demonstrated lobular bladder mass consistent with a primary bladder CA, no extension through the bladder wall, no pelvic or extra-pelvic lymphadenopathy or evidence of metastatic disease.  Patient reports of plans for cystoscopy, for which she has been cleared by her cardiologist (Dr. Krueger) for, however, nothing is scheduled yet.  Per chart review, cardiologist advised patient to discontinue aspirin on 5/22 and son was made aware    -Hgb stable, continue trending for cell counts.  Keep type and screen active.  Transfuse for Hgb <7 with PRBC units.  Defer Venofer infusion for now.  2 units PRBC ordered for transfusion today D/T AM Hgb 6  -Creatinine on admission 1.77 as low as 1.09 in January earlier this year.  Trend renal function labs and UOP, avoid nephrotoxins.  Urine electrolytes and urine creatinine ordered.  Renal US ordered.  Indeterminate FENa 1.1%.  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  Large polypoid mass in the bladder consistent with the patient's known history of bladder CA  -Urology consulted, appreciate recs  Avoid lai catheterization/CBI unless unable to void  Recommending Cardiology consultation for surgical clearance  No surgical intervention at this time     Chronic:  CAD and recent NSTEMI without stenting: Holding Plavix, continue statin  Hypertension and CKD III: Holding home lisinopril  Hypothyroidism: Continue home levothyroxine  HFrEF: Holding home Lasix 20 mg daily  Non-insulin-dependent T2DM: Hold home metformin, SSI mild  Tobacco use disorder, former smoker  Iron deficiency anemia  Ambulatory dysfunction    Checklist:  Antimicrobials: -  Oxygen: 1 LNC  Feeding: Adult diet regular  Fluids: Defer  DVT ppx: Plavix, SCDs  Glycemic control: SSI mild  Bowel care: MiraLAX  LDAs: PIV  Code Status: DNR and No Intubation    Prakash Marshall, DO   Internal Medicine PGY-1    This is a preliminary note written by the resident. Please wait for attending addendum for finalization of note and recommendations.

## 2024-05-24 NOTE — CARE PLAN
The patient's goals for the shift include to be free from discomfort    The clinical goals for the shift include to prevent further injury    Over the shift, the patient did not make progress toward the following goals. Barriers to progression include acute illness. Recommendations to address these barriers include communication.

## 2024-05-25 PROBLEM — R31.0 GROSS HEMATURIA: Status: ACTIVE | Noted: 2024-05-25

## 2024-05-25 LAB
ANION GAP SERPL CALC-SCNC: 12 MMOL/L (ref 10–20)
APTT PPP: 32 SECONDS (ref 27–38)
BACTERIA UR CULT: NORMAL
BASOPHILS # BLD AUTO: 0.06 X10*3/UL (ref 0–0.1)
BASOPHILS NFR BLD AUTO: 0.8 %
BUN SERPL-MCNC: 53 MG/DL (ref 6–23)
CALCIUM SERPL-MCNC: 8.8 MG/DL (ref 8.6–10.3)
CHLORIDE SERPL-SCNC: 108 MMOL/L (ref 98–107)
CO2 SERPL-SCNC: 24 MMOL/L (ref 21–32)
CREAT SERPL-MCNC: 1.38 MG/DL (ref 0.5–1.05)
EGFRCR SERPLBLD CKD-EPI 2021: 38 ML/MIN/1.73M*2
EOSINOPHIL # BLD AUTO: 0.37 X10*3/UL (ref 0–0.4)
EOSINOPHIL NFR BLD AUTO: 5.1 %
ERYTHROCYTE [DISTWIDTH] IN BLOOD BY AUTOMATED COUNT: 16.4 % (ref 11.5–14.5)
ERYTHROCYTE [DISTWIDTH] IN BLOOD BY AUTOMATED COUNT: 16.6 % (ref 11.5–14.5)
GLUCOSE BLD MANUAL STRIP-MCNC: 112 MG/DL (ref 74–99)
GLUCOSE BLD MANUAL STRIP-MCNC: 145 MG/DL (ref 74–99)
GLUCOSE BLD MANUAL STRIP-MCNC: 207 MG/DL (ref 74–99)
GLUCOSE BLD MANUAL STRIP-MCNC: 89 MG/DL (ref 74–99)
GLUCOSE SERPL-MCNC: 93 MG/DL (ref 74–99)
HCT VFR BLD AUTO: 25.4 % (ref 36–46)
HCT VFR BLD AUTO: 28.4 % (ref 36–46)
HGB BLD-MCNC: 7.8 G/DL (ref 12–16)
HGB BLD-MCNC: 8.9 G/DL (ref 12–16)
IMM GRANULOCYTES # BLD AUTO: 0.04 X10*3/UL (ref 0–0.5)
IMM GRANULOCYTES NFR BLD AUTO: 0.6 % (ref 0–0.9)
INR PPP: 1.1 (ref 0.9–1.1)
LYMPHOCYTES # BLD AUTO: 1.06 X10*3/UL (ref 0.8–3)
LYMPHOCYTES NFR BLD AUTO: 14.7 %
MCH RBC QN AUTO: 29.1 PG (ref 26–34)
MCH RBC QN AUTO: 29.8 PG (ref 26–34)
MCHC RBC AUTO-ENTMCNC: 30.7 G/DL (ref 32–36)
MCHC RBC AUTO-ENTMCNC: 31.3 G/DL (ref 32–36)
MCV RBC AUTO: 95 FL (ref 80–100)
MCV RBC AUTO: 95 FL (ref 80–100)
MONOCYTES # BLD AUTO: 0.8 X10*3/UL (ref 0.05–0.8)
MONOCYTES NFR BLD AUTO: 11.1 %
NEUTROPHILS # BLD AUTO: 4.89 X10*3/UL (ref 1.6–5.5)
NEUTROPHILS NFR BLD AUTO: 67.7 %
NRBC BLD-RTO: 0 /100 WBCS (ref 0–0)
NRBC BLD-RTO: 0 /100 WBCS (ref 0–0)
PLATELET # BLD AUTO: 266 X10*3/UL (ref 150–450)
PLATELET # BLD AUTO: 289 X10*3/UL (ref 150–450)
POTASSIUM SERPL-SCNC: 4.5 MMOL/L (ref 3.5–5.3)
PROTHROMBIN TIME: 12.3 SECONDS (ref 9.8–12.8)
RBC # BLD AUTO: 2.68 X10*6/UL (ref 4–5.2)
RBC # BLD AUTO: 2.99 X10*6/UL (ref 4–5.2)
SODIUM SERPL-SCNC: 139 MMOL/L (ref 136–145)
WBC # BLD AUTO: 7.2 X10*3/UL (ref 4.4–11.3)
WBC # BLD AUTO: 7.9 X10*3/UL (ref 4.4–11.3)

## 2024-05-25 PROCEDURE — 2500000004 HC RX 250 GENERAL PHARMACY W/ HCPCS (ALT 636 FOR OP/ED)

## 2024-05-25 PROCEDURE — 82565 ASSAY OF CREATININE: CPT | Mod: 91

## 2024-05-25 PROCEDURE — 99232 SBSQ HOSP IP/OBS MODERATE 35: CPT

## 2024-05-25 PROCEDURE — 85610 PROTHROMBIN TIME: CPT | Performed by: PHYSICIAN ASSISTANT

## 2024-05-25 PROCEDURE — 2500000001 HC RX 250 WO HCPCS SELF ADMINISTERED DRUGS (ALT 637 FOR MEDICARE OP)

## 2024-05-25 PROCEDURE — 36415 COLL VENOUS BLD VENIPUNCTURE: CPT | Performed by: PHYSICIAN ASSISTANT

## 2024-05-25 PROCEDURE — 1100000001 HC PRIVATE ROOM DAILY

## 2024-05-25 PROCEDURE — 82947 ASSAY GLUCOSE BLOOD QUANT: CPT

## 2024-05-25 PROCEDURE — 36415 COLL VENOUS BLD VENIPUNCTURE: CPT

## 2024-05-25 PROCEDURE — 85025 COMPLETE CBC W/AUTO DIFF WBC: CPT

## 2024-05-25 PROCEDURE — 85027 COMPLETE CBC AUTOMATED: CPT

## 2024-05-25 RX ADMIN — CEFTRIAXONE SODIUM 1 G: 1 INJECTION, SOLUTION INTRAVENOUS at 12:38

## 2024-05-25 RX ADMIN — LEVOTHYROXINE SODIUM 75 MCG: 0.07 TABLET ORAL at 06:00

## 2024-05-25 RX ADMIN — ATORVASTATIN CALCIUM 40 MG: 40 TABLET, FILM COATED ORAL at 20:00

## 2024-05-25 ASSESSMENT — PAIN SCALES - GENERAL
PAINLEVEL_OUTOF10: 0 - NO PAIN
PAINLEVEL_OUTOF10: 0 - NO PAIN

## 2024-05-25 ASSESSMENT — PAIN - FUNCTIONAL ASSESSMENT: PAIN_FUNCTIONAL_ASSESSMENT: 0-10

## 2024-05-25 ASSESSMENT — COGNITIVE AND FUNCTIONAL STATUS - GENERAL
WALKING IN HOSPITAL ROOM: A LITTLE
DAILY ACTIVITIY SCORE: 24
MOBILITY SCORE: 24
MOBILITY SCORE: 22
CLIMB 3 TO 5 STEPS WITH RAILING: A LITTLE
DAILY ACTIVITIY SCORE: 24

## 2024-05-25 NOTE — CARE PLAN
Problem: Pain - Adult  Goal: Verbalizes/displays adequate comfort level or baseline comfort level  Outcome: Progressing     Problem: Safety - Adult  Goal: Free from fall injury  Outcome: Progressing   The patient's goals for the shift include to be free from discomfort    The clinical goals for the shift include Pt will remain safe throughout shift

## 2024-05-25 NOTE — PROGRESS NOTES
Medicine Progress Note    Subjective     No acute overnight events.  Patient continues to have notable blood in urine but is no longer having any dysuria.  Denies any weakness, headaches, lightheadedness, dizziness, chest pain, or shortness of breath.  Patient otherwise has no new/acute symptoms.    Objective   Patient Vitals for the past 24 hrs:   BP Temp Temp src Pulse Resp SpO2   05/25/24 0552 (!) 105/47 36.2 °C (97.2 °F) -- 61 -- 92 %   05/24/24 2154 116/53 37.2 °C (99 °F) -- 66 -- 99 %   05/24/24 1717 127/60 37.1 °C (98.8 °F) Temporal 58 18 100 %   05/24/24 1540 120/53 37.1 °C (98.8 °F) Temporal 65 18 100 %   05/24/24 1503 105/53 36.5 °C (97.7 °F) Temporal 76 17 100 %   05/24/24 1455 105/53 36.5 °C (97.7 °F) Temporal 76 17 100 %   05/24/24 1440 107/52 36.5 °C (97.7 °F) Temporal 79 18 99 %   05/24/24 1407 100/56 36.5 °C (97.7 °F) Temporal 68 18 96 %   05/24/24 1330 118/50 36.2 °C (97.2 °F) Temporal 75 17 --   05/24/24 1232 99/50 36.2 °C (97.2 °F) Temporal 71 18 100 %   05/24/24 1203 103/51 36.4 °C (97.5 °F) Temporal 65 18 --   05/24/24 1147 (!) 99/49 36.4 °C (97.5 °F) Temporal 72 18 100 %   05/24/24 1132 110/52 36.2 °C (97.2 °F) Temporal 71 18 100 %   05/24/24 1127 110/52 36.2 °C (97.2 °F) Temporal 71 18 100 %      Labs, radiological imaging and cardiac work up were personally reviewed    Ventilator/Oxygen Supply:     Oxygen Therapy/Pulse Ox  SpO2: 92 %  Temp: 36.2 °C (97.2 °F)  Labs:   Results from last 7 days   Lab Units 05/25/24  0700 05/24/24  0639 05/23/24  1702   SODIUM mmol/L 139 138 139   POTASSIUM mmol/L 4.5 4.2 4.8   CHLORIDE mmol/L 108* 107 105   CO2 mmol/L 24 23 24   BUN mg/dL 53* 53* 55*   CREATININE mg/dL 1.38* 1.53* 1.77*   GLUCOSE mg/dL 93 79 112*   CALCIUM mg/dL 8.8 8.6 9.2     Results from last 7 days   Lab Units 05/25/24  0700   WBC AUTO x10*3/uL 7.2   HEMOGLOBIN g/dL 7.8*   HEMATOCRIT % 25.4*   PLATELETS AUTO x10*3/uL 266   Imaging:  No results found.  US renal complete   Final Result   1.  Simple right renal cysts.   2. 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   3. Large polypoid mass in the bladder consistent with the patient's   known history of bladder carcinoma.        MACRO:   None        Signed by: Dior Roe 5/24/2024 9:05 AM   Dictation workstation:   [id number]        Medications:  Inpatient scheduled medications:  atorvastatin, 40 mg, oral, Nightly  cefTRIAXone, 1 g, intravenous, q24h  [Held by provider] clopidogrel, 75 mg, oral, Daily  [Held by provider] furosemide, 20 mg, oral, Daily  insulin lispro, 0-5 Units, subcutaneous, TID  levothyroxine, 75 mcg, oral, Daily before breakfast  [Held by provider] lisinopril, 2.5 mg, oral, Daily  polyethylene glycol, 17 g, oral, Daily    Inpatient PRN medications:  PRN medications: dextrose, dextrose, glucagon, glucagon  Inpatient continuous medications:      Physical Exam:   GENERAL: Awake/alert, cooperative, conversant, NAD.  HEAD:  Normocephalic, atraumatic.  EYES:  Round and reactive. Anicteric.  ENT:  No nasal discharge, mucous membranes moist and pink.  NECK:  Atraumatic, no meningismus. No JVD, cervical lymphadenopathy bilaterally.  CARDIOVASCULAR: HRRR, noncyanotic.  RESPIRATORY: CTAB without wheezes, rhonchi, rales.  On 1 LNC, no acute respiratory distress.  ABDOMEN:  Soft, + suprapubic tenderness, nondistended, hypoactive bowel sounds.  EXTREMITIES:  No peripheral edema, no calf tenderness. Peripheral pulses intact. Scattered bruises.   SKIN:  Warm and dry. No tenting. No rash or open wound noted.  NEUROLOGICAL:  Nonfocal grossly.  A&O x 4. CN II-XII grossly intact.    Assessment/Plan   Lupe Oshea is a 84 y.o. year old female with a PMHx of primary bladder CA, HTN, HLD, HFrEF (40 to 45% EF with multiple regional wall motion abnormalities and mild to moderate aortic valve regurgitation and recent echo) hypothyroidism, non-insulin-dependent T2DM, CKD stage III, CAD, recent NSTEMI (no stent placement, medically managed on  DAPT), COPD on home O2 with chronic hypoxic respiratory failure, iron deficiency anemia, ambulatory dysfunction, former tobacco use, recent hospital admission for mechanical fall, confusion, PNA who presents to [facility] for hematuria.  Her PCP is Dr. Spring.     Daily updates  5/25/2024: No significant changes to plan, hemoglobin went down slightly we will recheck CBC    #Hematuria  #UTI/asymptomatic bacteriuria  #Primary bladder CA  #CKD III, questionable LAUREN  # Acute on chronic anemia secondary to hematuria  -Hemoglobin remained stable this a.m. will recheck CBC today  -Has been seeing OP urology at [facility] - was found to have a bladder mass concerning for malignancy on renal US previously.  CTAP 3/18/2024 demonstrated lobular bladder mass consistent with a primary bladder CA, no extension through the bladder wall, no pelvic or extra-pelvic lymphadenopathy or evidence of metastatic disease.  Patient reports of plans for cystoscopy, for which she has been cleared by her cardiologist (Dr. Krueger) for, however, nothing is scheduled yet.  Per chart review, cardiologist advised patient to discontinue aspirin on 5/22 and son was made aware    -Perioperative risk stratification:  Class III Risk per RCRI  Patient is at moderate risk but acceptable to proceed with surgery per regular cardiologist Dr. Krueger    -Creatinine slightly decreased to 1.5 from 1.7, will continue to monitor  - Trend renal function labs and UOP, avoid nephrotoxins.  Urine electrolytes and urine creatinine ordered.  Renal US unremarkable.  Indeterminate FENa 1.1%.  UA positive for blood and leukocyte esterase, symptomatic for UTI however may be confounded in the setting of hematuria, will treat for uncomplicated UTI with empiric coverage - follow-up on urine culture  -Urology consulted, appreciate recs  Avoid lai catheterization/CBI unless unable to void  Recommending Cardiology consultation for surgical clearance  Would like to do TURBT, to  be present scheduled for Tuesday  -Heme following, no significant plans at this point, believes anemia is likely secondary to persistent hematuria with possible component of chronic renal disease contributing as well as anticoagulant use     Chronic:  CAD and recent NSTEMI without stenting: Holding Plavix, continue statin  Hypertension and CKD III: Holding home lisinopril  Hypothyroidism: Continue home levothyroxine  HFrEF: Holding home Lasix 20 mg daily  Non-insulin-dependent T2DM: Hold home metformin, SSI mild  Tobacco use disorder, former smoker  Iron deficiency anemia  Ambulatory dysfunction    Checklist:  Antimicrobials: -  Oxygen: 1 LNC  Feeding: Adult diet regular  Fluids: Defer  DVT ppx:  SCDs  Glycemic control: SSI mild  Bowel care: MiraLAX  LDAs: PIV  Code Status: DNR and No Intubation    This is a preliminary note written by the resident. Please wait for attending addendum for finalization of note and recommendations.

## 2024-05-25 NOTE — CARE PLAN
The patient's goals for the shift include decrease in hematuria    The clinical goals for the shift include Pt will remain safe and have a decrease in hematuria    Over the shift, the patient did not make progress toward the following goals. Barriers to progression include Hematuria. Recommendations to address these barriers include Hold anticoagulants, monitor H+H.

## 2024-05-25 NOTE — PROGRESS NOTES
Lupe Oshea is a 84 y.o. female on day 0 of admission presenting with Hematuria.    Subjective   Hematuria continues  Transfuse yesterday with improved h/h  Discussed with dr vasquez  Day 2 off dapt  Earliest she could have turbt would be tuesday       Objective     Physical Exam    Last Recorded Vitals  Blood pressure (!) 105/47, pulse 61, temperature 36.2 °C (97.2 °F), resp. rate 18, height 1.524 m (5'), weight 54.4 kg (120 lb), SpO2 92%.  Intake/Output last 3 Shifts:  I/O last 3 completed shifts:  In: 1420.4 (26.1 mL/kg) [P.O.:737; Blood:633.4; IV Piggyback:50]  Out: 1050 (19.3 mL/kg) [Urine:1050 (0.5 mL/kg/hr)]  Weight: 54.4 kg     Relevant Results  Results for orders placed or performed during the hospital encounter of 05/23/24 (from the past 24 hour(s))   POCT GLUCOSE   Result Value Ref Range    POCT Glucose 223 (H) 74 - 99 mg/dL   POCT GLUCOSE   Result Value Ref Range    POCT Glucose 106 (H) 74 - 99 mg/dL   CBC   Result Value Ref Range    WBC 6.9 4.4 - 11.3 x10*3/uL    nRBC 0.0 0.0 - 0.0 /100 WBCs    RBC 3.15 (L) 4.00 - 5.20 x10*6/uL    Hemoglobin 9.5 (L) 12.0 - 16.0 g/dL    Hematocrit 29.2 (L) 36.0 - 46.0 %    MCV 93 80 - 100 fL    MCH 30.2 26.0 - 34.0 pg    MCHC 32.5 32.0 - 36.0 g/dL    RDW 15.7 (H) 11.5 - 14.5 %    Platelets 293 150 - 450 x10*3/uL   POCT GLUCOSE   Result Value Ref Range    POCT Glucose 183 (H) 74 - 99 mg/dL   POCT GLUCOSE   Result Value Ref Range    POCT Glucose 89 74 - 99 mg/dL   CBC and Auto Differential   Result Value Ref Range    WBC 7.2 4.4 - 11.3 x10*3/uL    nRBC 0.0 0.0 - 0.0 /100 WBCs    RBC 2.68 (L) 4.00 - 5.20 x10*6/uL    Hemoglobin 7.8 (L) 12.0 - 16.0 g/dL    Hematocrit 25.4 (L) 36.0 - 46.0 %    MCV 95 80 - 100 fL    MCH 29.1 26.0 - 34.0 pg    MCHC 30.7 (L) 32.0 - 36.0 g/dL    RDW 16.6 (H) 11.5 - 14.5 %    Platelets 266 150 - 450 x10*3/uL    Neutrophils % 67.7 40.0 - 80.0 %    Immature Granulocytes %, Automated 0.6 0.0 - 0.9 %    Lymphocytes % 14.7 13.0 - 44.0 %     Monocytes % 11.1 2.0 - 10.0 %    Eosinophils % 5.1 0.0 - 6.0 %    Basophils % 0.8 0.0 - 2.0 %    Neutrophils Absolute 4.89 1.60 - 5.50 x10*3/uL    Immature Granulocytes Absolute, Automated 0.04 0.00 - 0.50 x10*3/uL    Lymphocytes Absolute 1.06 0.80 - 3.00 x10*3/uL    Monocytes Absolute 0.80 0.05 - 0.80 x10*3/uL    Eosinophils Absolute 0.37 0.00 - 0.40 x10*3/uL    Basophils Absolute 0.06 0.00 - 0.10 x10*3/uL   Basic Metabolic Panel   Result Value Ref Range    Glucose 93 74 - 99 mg/dL    Sodium 139 136 - 145 mmol/L    Potassium 4.5 3.5 - 5.3 mmol/L    Chloride 108 (H) 98 - 107 mmol/L    Bicarbonate 24 21 - 32 mmol/L    Anion Gap 12 10 - 20 mmol/L    Urea Nitrogen 53 (H) 6 - 23 mg/dL    Creatinine 1.38 (H) 0.50 - 1.05 mg/dL    eGFR 38 (L) >60 mL/min/1.73m*2    Calcium 8.8 8.6 - 10.3 mg/dL                                 Assessment/Plan   Principal Problem:    Hematuria    Bladder tumor  Plan for turbt Tuesday  Will speak with son tomorrow       I spent 10 minutes in the professional and overall care of this patient.      Aidan Hernandez PA-C

## 2024-05-26 LAB
ANION GAP SERPL CALC-SCNC: 12 MMOL/L (ref 10–20)
BASOPHILS # BLD AUTO: 0.07 X10*3/UL (ref 0–0.1)
BASOPHILS NFR BLD AUTO: 1.1 %
BUN SERPL-MCNC: 43 MG/DL (ref 6–23)
CALCIUM SERPL-MCNC: 8.7 MG/DL (ref 8.6–10.3)
CHLORIDE SERPL-SCNC: 108 MMOL/L (ref 98–107)
CO2 SERPL-SCNC: 26 MMOL/L (ref 21–32)
CREAT SERPL-MCNC: 1.27 MG/DL (ref 0.5–1.05)
EGFRCR SERPLBLD CKD-EPI 2021: 42 ML/MIN/1.73M*2
EOSINOPHIL # BLD AUTO: 0.44 X10*3/UL (ref 0–0.4)
EOSINOPHIL NFR BLD AUTO: 6.8 %
ERYTHROCYTE [DISTWIDTH] IN BLOOD BY AUTOMATED COUNT: 16 % (ref 11.5–14.5)
GLUCOSE BLD MANUAL STRIP-MCNC: 212 MG/DL (ref 74–99)
GLUCOSE BLD MANUAL STRIP-MCNC: 86 MG/DL (ref 74–99)
GLUCOSE BLD MANUAL STRIP-MCNC: 89 MG/DL (ref 74–99)
GLUCOSE BLD MANUAL STRIP-MCNC: 96 MG/DL (ref 74–99)
GLUCOSE SERPL-MCNC: 85 MG/DL (ref 74–99)
HCT VFR BLD AUTO: 26 % (ref 36–46)
HGB BLD-MCNC: 7.9 G/DL (ref 12–16)
IMM GRANULOCYTES # BLD AUTO: 0.04 X10*3/UL (ref 0–0.5)
IMM GRANULOCYTES NFR BLD AUTO: 0.6 % (ref 0–0.9)
LYMPHOCYTES # BLD AUTO: 1.27 X10*3/UL (ref 0.8–3)
LYMPHOCYTES NFR BLD AUTO: 19.6 %
MCH RBC QN AUTO: 29.3 PG (ref 26–34)
MCHC RBC AUTO-ENTMCNC: 30.4 G/DL (ref 32–36)
MCV RBC AUTO: 96 FL (ref 80–100)
MONOCYTES # BLD AUTO: 0.67 X10*3/UL (ref 0.05–0.8)
MONOCYTES NFR BLD AUTO: 10.3 %
NEUTROPHILS # BLD AUTO: 4 X10*3/UL (ref 1.6–5.5)
NEUTROPHILS NFR BLD AUTO: 61.6 %
NRBC BLD-RTO: 0 /100 WBCS (ref 0–0)
PLATELET # BLD AUTO: 278 X10*3/UL (ref 150–450)
POTASSIUM SERPL-SCNC: 4.6 MMOL/L (ref 3.5–5.3)
RBC # BLD AUTO: 2.7 X10*6/UL (ref 4–5.2)
SODIUM SERPL-SCNC: 141 MMOL/L (ref 136–145)
WBC # BLD AUTO: 6.5 X10*3/UL (ref 4.4–11.3)

## 2024-05-26 PROCEDURE — 2500000004 HC RX 250 GENERAL PHARMACY W/ HCPCS (ALT 636 FOR OP/ED)

## 2024-05-26 PROCEDURE — 85025 COMPLETE CBC W/AUTO DIFF WBC: CPT

## 2024-05-26 PROCEDURE — 1100000001 HC PRIVATE ROOM DAILY

## 2024-05-26 PROCEDURE — 2500000001 HC RX 250 WO HCPCS SELF ADMINISTERED DRUGS (ALT 637 FOR MEDICARE OP)

## 2024-05-26 PROCEDURE — 82947 ASSAY GLUCOSE BLOOD QUANT: CPT

## 2024-05-26 PROCEDURE — 99232 SBSQ HOSP IP/OBS MODERATE 35: CPT

## 2024-05-26 PROCEDURE — 80048 BASIC METABOLIC PNL TOTAL CA: CPT

## 2024-05-26 PROCEDURE — 36415 COLL VENOUS BLD VENIPUNCTURE: CPT

## 2024-05-26 RX ORDER — CEFTRIAXONE 1 G/50ML
1 INJECTION, SOLUTION INTRAVENOUS EVERY 24 HOURS
Status: COMPLETED | OUTPATIENT
Start: 2024-05-26 | End: 2024-05-26

## 2024-05-26 RX ADMIN — CEFTRIAXONE SODIUM 1 G: 1 INJECTION, SOLUTION INTRAVENOUS at 14:38

## 2024-05-26 RX ADMIN — ATORVASTATIN CALCIUM 40 MG: 40 TABLET, FILM COATED ORAL at 20:17

## 2024-05-26 RX ADMIN — LEVOTHYROXINE SODIUM 75 MCG: 0.07 TABLET ORAL at 06:00

## 2024-05-26 ASSESSMENT — PAIN - FUNCTIONAL ASSESSMENT
PAIN_FUNCTIONAL_ASSESSMENT: 0-10

## 2024-05-26 ASSESSMENT — PAIN SCALES - GENERAL
PAINLEVEL_OUTOF10: 0 - NO PAIN

## 2024-05-26 NOTE — PROGRESS NOTES
Lupe Oshea is a 84 y.o. female on day 1 of admission presenting with Hematuria.    Subjective   Hematuria continues  Son is at bedside  Discussed plan for turbt this admission with dr vasquez  She will be off plavix/asa 5 days on Tuesday  Dr vasquez recommended turbt Tuesday or Wednesday  As she has coronary disease and acute blood loss may lead to repeat mi  Discussed turbt with she and her son at length  Including acute blood loss/mi  As well as anesthetic risk  She has been cleared by cardiology for turbt       Objective     Physical Exam    Last Recorded Vitals  Blood pressure 114/55, pulse 66, temperature 36.3 °C (97.3 °F), temperature source Temporal, resp. rate 16, height 1.524 m (5'), weight 54.4 kg (120 lb), SpO2 100%.  Intake/Output last 3 Shifts:  I/O last 3 completed shifts:  In: 790 (14.5 mL/kg) [P.O.:740; IV Piggyback:50]  Out: 1875 (34.4 mL/kg) [Urine:1875 (1 mL/kg/hr)]  Weight: 54.4 kg     Relevant Results  Results for orders placed or performed during the hospital encounter of 05/23/24 (from the past 24 hour(s))   POCT GLUCOSE   Result Value Ref Range    POCT Glucose 112 (H) 74 - 99 mg/dL   Coagulation Screen   Result Value Ref Range    Protime 12.3 9.8 - 12.8 seconds    INR 1.1 0.9 - 1.1    aPTT 32 27 - 38 seconds   CBC   Result Value Ref Range    WBC 7.9 4.4 - 11.3 x10*3/uL    nRBC 0.0 0.0 - 0.0 /100 WBCs    RBC 2.99 (L) 4.00 - 5.20 x10*6/uL    Hemoglobin 8.9 (L) 12.0 - 16.0 g/dL    Hematocrit 28.4 (L) 36.0 - 46.0 %    MCV 95 80 - 100 fL    MCH 29.8 26.0 - 34.0 pg    MCHC 31.3 (L) 32.0 - 36.0 g/dL    RDW 16.4 (H) 11.5 - 14.5 %    Platelets 289 150 - 450 x10*3/uL   POCT GLUCOSE   Result Value Ref Range    POCT Glucose 145 (H) 74 - 99 mg/dL   POCT GLUCOSE   Result Value Ref Range    POCT Glucose 207 (H) 74 - 99 mg/dL   POCT GLUCOSE   Result Value Ref Range    POCT Glucose 89 74 - 99 mg/dL   CBC and Auto Differential   Result Value Ref Range    WBC 6.5 4.4 - 11.3 x10*3/uL    nRBC 0.0 0.0 -  0.0 /100 WBCs    RBC 2.70 (L) 4.00 - 5.20 x10*6/uL    Hemoglobin 7.9 (L) 12.0 - 16.0 g/dL    Hematocrit 26.0 (L) 36.0 - 46.0 %    MCV 96 80 - 100 fL    MCH 29.3 26.0 - 34.0 pg    MCHC 30.4 (L) 32.0 - 36.0 g/dL    RDW 16.0 (H) 11.5 - 14.5 %    Platelets 278 150 - 450 x10*3/uL    Neutrophils % 61.6 40.0 - 80.0 %    Immature Granulocytes %, Automated 0.6 0.0 - 0.9 %    Lymphocytes % 19.6 13.0 - 44.0 %    Monocytes % 10.3 2.0 - 10.0 %    Eosinophils % 6.8 0.0 - 6.0 %    Basophils % 1.1 0.0 - 2.0 %    Neutrophils Absolute 4.00 1.60 - 5.50 x10*3/uL    Immature Granulocytes Absolute, Automated 0.04 0.00 - 0.50 x10*3/uL    Lymphocytes Absolute 1.27 0.80 - 3.00 x10*3/uL    Monocytes Absolute 0.67 0.05 - 0.80 x10*3/uL    Eosinophils Absolute 0.44 (H) 0.00 - 0.40 x10*3/uL    Basophils Absolute 0.07 0.00 - 0.10 x10*3/uL   Basic Metabolic Panel   Result Value Ref Range    Glucose 85 74 - 99 mg/dL    Sodium 141 136 - 145 mmol/L    Potassium 4.6 3.5 - 5.3 mmol/L    Chloride 108 (H) 98 - 107 mmol/L    Bicarbonate 26 21 - 32 mmol/L    Anion Gap 12 10 - 20 mmol/L    Urea Nitrogen 43 (H) 6 - 23 mg/dL    Creatinine 1.27 (H) 0.50 - 1.05 mg/dL    eGFR 42 (L) >60 mL/min/1.73m*2    Calcium 8.7 8.6 - 10.3 mg/dL                                  Assessment/Plan   Principal Problem:    Hematuria  Active Problems:    Gross hematuria    Bladder mass   Hematuria  Plan for cysto/turbt Tuesday or Wednesday pending availability       I spent 30 minutes in the professional and overall care of this patient.      Aidan Hernandez PA-C

## 2024-05-26 NOTE — CARE PLAN
The patient's goals for the shift include patient to remain safe and free from injury throughout the shift with pain and comfort managed and maintained throughout the shift    The clinical goals for the shift include Patient to remain hemodynamically stable througghout the shift    Over the shift, the patient did not make progress toward the following goals. Barriers to progression include weakness, hematuria, COPD, and pain. Recommendations to address these barriers include   Problem: Pain - Adult  Goal: Verbalizes/displays adequate comfort level or baseline comfort level  Outcome: Progressing     Problem: Safety - Adult  Goal: Free from fall injury  Outcome: Progressing     Problem: Discharge Planning  Goal: Discharge to home or other facility with appropriate resources  Outcome: Progressing     Problem: Chronic Conditions and Co-morbidities  Goal: Patient's chronic conditions and co-morbidity symptoms are monitored and maintained or improved  Outcome: Progressing     Problem: Diabetes  Goal: Achieve decreasing blood glucose levels by end of shift  Outcome: Progressing  Goal: Increase stability of blood glucose readings by end of shift  Outcome: Progressing  Goal: Decrease in ketones present in urine by end of shift  Outcome: Progressing  Goal: Maintain electrolyte levels within acceptable range throughout shift  Outcome: Progressing  Goal: Maintain glucose levels >70mg/dl to <250mg/dl throughout shift  Outcome: Progressing  Goal: No changes in neurological exam by end of shift  Outcome: Progressing  Goal: Learn about and adhere to nutrition recommendations by end of shift  Outcome: Progressing  Goal: Vital signs within normal range for age by end of shift  Outcome: Progressing  Goal: Increase self care and/or family involovement by end of shift  Outcome: Progressing  Goal: Receive DSME education by end of shift  Outcome: Progressing   .

## 2024-05-26 NOTE — PROGRESS NOTES
Medicine Progress Note    Subjective     No acute overnight events.  Patient continues to have notable blood in urine but is no longer having any dysuria.  Denies any weakness, headaches, lightheadedness, dizziness, chest pain, or shortness of breath.  Patient otherwise has no new/acute symptoms.  Patient had discussion with urology and would like to pursue TURBT on Tuesday/Wednesday.    Objective   Patient Vitals for the past 24 hrs:   BP Temp Temp src Pulse Resp SpO2   05/26/24 0555 114/55 36.3 °C (97.3 °F) Temporal 66 16 100 %   05/26/24 0200 112/57 36.5 °C (97.7 °F) Temporal 69 20 97 %   05/26/24 0000 -- -- -- -- -- 95 %   05/25/24 2000 120/54 36.7 °C (98.1 °F) Temporal 71 18 94 %   05/25/24 1349 108/50 36.5 °C (97.7 °F) Temporal 74 16 96 %      Labs, radiological imaging and cardiac work up were personally reviewed    Ventilator/Oxygen Supply:     Oxygen Therapy/Pulse Ox  Medical Gas Therapy: Supplemental oxygen  O2 Delivery Method: Nasal cannula (2L NC)  SpO2: 100 %  Patient Activity During SpO2 Measurement: At rest  Temp: 36.3 °C (97.3 °F)  Labs:   Results from last 7 days   Lab Units 05/26/24  0648 05/25/24  0700 05/24/24  0639   SODIUM mmol/L 141 139 138   POTASSIUM mmol/L 4.6 4.5 4.2   CHLORIDE mmol/L 108* 108* 107   CO2 mmol/L 26 24 23   BUN mg/dL 43* 53* 53*   CREATININE mg/dL 1.27* 1.38* 1.53*   GLUCOSE mg/dL 85 93 79   CALCIUM mg/dL 8.7 8.8 8.6     Results from last 7 days   Lab Units 05/26/24  0648   WBC AUTO x10*3/uL 6.5   HEMOGLOBIN g/dL 7.9*   HEMATOCRIT % 26.0*   PLATELETS AUTO x10*3/uL 278   Imaging:  No results found.  US renal complete   Final Result   1. Simple right renal cysts.   2. 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   3. Large polypoid mass in the bladder consistent with the patient's   known history of bladder carcinoma.        MACRO:   None        Signed by: Dior Roe 5/24/2024 9:05 AM   Dictation workstation:   [id number]        Medications:  Inpatient scheduled  medications:  atorvastatin, 40 mg, oral, Nightly  cefTRIAXone, 1 g, intravenous, q24h  [Held by provider] clopidogrel, 75 mg, oral, Daily  [Held by provider] furosemide, 20 mg, oral, Daily  insulin lispro, 0-5 Units, subcutaneous, TID  levothyroxine, 75 mcg, oral, Daily before breakfast  [Held by provider] lisinopril, 2.5 mg, oral, Daily  polyethylene glycol, 17 g, oral, Daily    Inpatient PRN medications:  PRN medications: dextrose, dextrose, glucagon, glucagon  Inpatient continuous medications:      Physical Exam:   GENERAL: Awake/alert, cooperative, conversant, NAD.  HEAD:  Normocephalic, atraumatic.  EYES:  Round and reactive. Anicteric.  ENT:  No nasal discharge, mucous membranes moist and pink.  NECK:  Atraumatic, no meningismus. No JVD, cervical lymphadenopathy bilaterally.  CARDIOVASCULAR: HRRR, noncyanotic.  RESPIRATORY: CTAB without wheezes, rhonchi, rales.  On 1 LNC, no acute respiratory distress.  ABDOMEN:  Soft, + suprapubic tenderness, nondistended, hypoactive bowel sounds.  EXTREMITIES:  No peripheral edema, no calf tenderness. Peripheral pulses intact. Scattered bruises.   SKIN:  Warm and dry. No tenting. No rash or open wound noted.  NEUROLOGICAL:  Nonfocal grossly.  A&O x 4. CN II-XII grossly intact.    Assessment/Plan   Lupe Oshea is a 84 y.o. year old female with a PMHx of primary bladder CA, HTN, HLD, HFrEF (40 to 45% EF with multiple regional wall motion abnormalities and mild to moderate aortic valve regurgitation and recent echo) hypothyroidism, non-insulin-dependent T2DM, CKD stage III, CAD, recent NSTEMI (no stent placement, medically managed on DAPT), COPD on home O2 with chronic hypoxic respiratory failure, iron deficiency anemia, ambulatory dysfunction, former tobacco use, recent hospital admission for mechanical fall, confusion, PNA who presents to [facility] for hematuria.  Her PCP is Dr. Spring.     Daily updates  5/25/2024: No significant changes to plan, hemoglobin went down  slightly we will recheck CBC  5/26/2024: No significant changes to plan, continue to monitor patient until TURBT scheduled on Tuesday/Wednesday, blood level stable.  Stopped and Rocephin.    #Hematuria  #UTI/asymptomatic bacteriuria  #Primary bladder CA  #CKD III, questionable LAUREN  # Acute on chronic anemia secondary to hematuria  -Hemoglobin remained stable this a.m. will recheck CBC today  -Has been seeing OP urology at [facility] - was found to have a bladder mass concerning for malignancy on renal US previously.  CTAP 3/18/2024 demonstrated lobular bladder mass consistent with a primary bladder CA, no extension through the bladder wall, no pelvic or extra-pelvic lymphadenopathy or evidence of metastatic disease.  Patient reports of plans for cystoscopy, for which she has been cleared by her cardiologist (Dr. Krueger) for, however, nothing is scheduled yet.  Per chart review, cardiologist advised patient to discontinue aspirin on 5/22 and son was made aware    -Perioperative risk stratification:  Class III Risk per RCRI  Patient is at moderate risk but acceptable to proceed with surgery per regular cardiologist Dr. Krueger    -Creatinine slightly decreased to 1.5 from 1.7, will continue to monitor  - Trend renal function labs and UOP, avoid nephrotoxins.  Urine electrolytes and urine creatinine ordered.  Renal US unremarkable.  Indeterminate FENa 1.1%.  UA positive for blood and leukocyte esterase, symptomatic for UTI however may be confounded in the setting of hematuria, will treat for uncomplicated UTI with empiric coverage - follow-up on urine culture  -Urology consulted, appreciate recs  Avoid lia catheterization/CBI unless unable to void  Recommending Cardiology consultation for surgical clearance  Would like to do TURBT, to be present scheduled for Tuesday  -Heme following, no significant plans at this point, believes anemia is likely secondary to persistent hematuria with possible component of chronic  renal disease contributing as well as anticoagulant use     Chronic:  CAD and recent NSTEMI without stenting: Holding Plavix, continue statin  Hypertension and CKD III: Holding home lisinopril  Hypothyroidism: Continue home levothyroxine  HFrEF: Holding home Lasix 20 mg daily  Non-insulin-dependent T2DM: Hold home metformin, SSI mild  Tobacco use disorder, former smoker  Iron deficiency anemia  Ambulatory dysfunction    Checklist:  Antimicrobials: -  Oxygen: 1 LNC  Feeding: Adult diet regular  Fluids: Defer  DVT ppx:  SCDs  Glycemic control: SSI mild  Bowel care: MiraLAX  LDAs: PIV  Code Status: DNR and No Intubation    This is a preliminary note written by the resident. Please wait for attending addendum for finalization of note and recommendations.

## 2024-05-26 NOTE — CARE PLAN
The patient's goals for the shift include    Problem: Pain - Adult  Goal: Verbalizes/displays adequate comfort level or baseline comfort level  Outcome: Progressing  Flowsheets (Taken 5/26/2024 7126)  Verbalizes/displays adequate comfort level or baseline comfort level:   Assess pain using appropriate pain scale   Implement non-pharmacological measures as appropriate and evaluate response     The clinical goals for the shift include pt will remain hemodynamically stable throughout shift

## 2024-05-27 LAB
ANION GAP SERPL CALC-SCNC: 10 MMOL/L (ref 10–20)
BUN SERPL-MCNC: 38 MG/DL (ref 6–23)
CALCIUM SERPL-MCNC: 8.6 MG/DL (ref 8.6–10.3)
CHLORIDE SERPL-SCNC: 109 MMOL/L (ref 98–107)
CO2 SERPL-SCNC: 25 MMOL/L (ref 21–32)
CREAT SERPL-MCNC: 1.26 MG/DL (ref 0.5–1.05)
EGFRCR SERPLBLD CKD-EPI 2021: 42 ML/MIN/1.73M*2
ERYTHROCYTE [DISTWIDTH] IN BLOOD BY AUTOMATED COUNT: 15.6 % (ref 11.5–14.5)
GLUCOSE BLD MANUAL STRIP-MCNC: 104 MG/DL (ref 74–99)
GLUCOSE BLD MANUAL STRIP-MCNC: 159 MG/DL (ref 74–99)
GLUCOSE BLD MANUAL STRIP-MCNC: 208 MG/DL (ref 74–99)
GLUCOSE BLD MANUAL STRIP-MCNC: 84 MG/DL (ref 74–99)
GLUCOSE SERPL-MCNC: 91 MG/DL (ref 74–99)
HCT VFR BLD AUTO: 26.3 % (ref 36–46)
HGB BLD-MCNC: 8.1 G/DL (ref 12–16)
MAGNESIUM SERPL-MCNC: 1.98 MG/DL (ref 1.6–2.4)
MCH RBC QN AUTO: 29.7 PG (ref 26–34)
MCHC RBC AUTO-ENTMCNC: 30.8 G/DL (ref 32–36)
MCV RBC AUTO: 96 FL (ref 80–100)
NRBC BLD-RTO: 0 /100 WBCS (ref 0–0)
PLATELET # BLD AUTO: 267 X10*3/UL (ref 150–450)
POTASSIUM SERPL-SCNC: 4.7 MMOL/L (ref 3.5–5.3)
RBC # BLD AUTO: 2.73 X10*6/UL (ref 4–5.2)
SODIUM SERPL-SCNC: 139 MMOL/L (ref 136–145)
WBC # BLD AUTO: 8.2 X10*3/UL (ref 4.4–11.3)

## 2024-05-27 PROCEDURE — 2500000001 HC RX 250 WO HCPCS SELF ADMINISTERED DRUGS (ALT 637 FOR MEDICARE OP)

## 2024-05-27 PROCEDURE — 36415 COLL VENOUS BLD VENIPUNCTURE: CPT

## 2024-05-27 PROCEDURE — 82947 ASSAY GLUCOSE BLOOD QUANT: CPT | Mod: 91

## 2024-05-27 PROCEDURE — 1100000001 HC PRIVATE ROOM DAILY

## 2024-05-27 PROCEDURE — 80048 BASIC METABOLIC PNL TOTAL CA: CPT

## 2024-05-27 PROCEDURE — 83735 ASSAY OF MAGNESIUM: CPT

## 2024-05-27 PROCEDURE — 85027 COMPLETE CBC AUTOMATED: CPT

## 2024-05-27 PROCEDURE — 99232 SBSQ HOSP IP/OBS MODERATE 35: CPT

## 2024-05-27 PROCEDURE — 82947 ASSAY GLUCOSE BLOOD QUANT: CPT

## 2024-05-27 RX ADMIN — INSULIN LISPRO 1 UNITS: 100 INJECTION, SOLUTION INTRAVENOUS; SUBCUTANEOUS at 17:52

## 2024-05-27 RX ADMIN — LEVOTHYROXINE SODIUM 75 MCG: 0.07 TABLET ORAL at 06:05

## 2024-05-27 RX ADMIN — ATORVASTATIN CALCIUM 40 MG: 40 TABLET, FILM COATED ORAL at 19:47

## 2024-05-27 ASSESSMENT — COGNITIVE AND FUNCTIONAL STATUS - GENERAL
DAILY ACTIVITIY SCORE: 24
MOBILITY SCORE: 23
CLIMB 3 TO 5 STEPS WITH RAILING: A LITTLE

## 2024-05-27 ASSESSMENT — PAIN - FUNCTIONAL ASSESSMENT: PAIN_FUNCTIONAL_ASSESSMENT: 0-10

## 2024-05-27 ASSESSMENT — PAIN SCALES - GENERAL
PAINLEVEL_OUTOF10: 0 - NO PAIN
PAINLEVEL_OUTOF10: 0 - NO PAIN

## 2024-05-27 NOTE — PROGRESS NOTES
Lupe Oshea is a 84 y.o. female on day 2 of admission presenting with Hematuria.    Subjective   F/U gross hematuria/bladder mass  Sitting up in bed doing a word search  Eager for surgery to be done  States blood in her urine is improving, not as dark    Objective     Physical Exam  No distress    Current Facility-Administered Medications:     atorvastatin (Lipitor) tablet 40 mg, 40 mg, oral, Nightly, Larry Almonte MD, 40 mg at 05/26/24 2017    [Held by provider] clopidogrel (Plavix) tablet 75 mg, 75 mg, oral, Daily, Enoc Horvath DO    dextrose 50 % injection 12.5 g, 12.5 g, intravenous, q15 min PRN, Prakash Marshall DO    dextrose 50 % injection 25 g, 25 g, intravenous, q15 min PRN, Prakash Marshall DO    [Held by provider] furosemide (Lasix) tablet 20 mg, 20 mg, oral, Daily, Enoc Horvath DO    glucagon (Glucagen) injection 1 mg, 1 mg, intramuscular, q15 min PRN, Prakash Marshall DO    glucagon (Glucagen) injection 1 mg, 1 mg, intramuscular, q15 min PRN, Prakash Marshall DO    insulin lispro (HumaLOG) injection 0-5 Units, 0-5 Units, subcutaneous, TID, Prakash Marshall DO, 2 Units at 05/24/24 1254    levothyroxine (Synthroid, Levoxyl) tablet 75 mcg, 75 mcg, oral, Daily before breakfast, Enoc Horvath DO, 75 mcg at 05/27/24 0605    [Held by provider] lisinopril tablet 2.5 mg, 2.5 mg, oral, Daily, Enoc Horvath DO    polyethylene glycol (Glycolax, Miralax) packet 17 g, 17 g, oral, Daily, Prakash Marshall DO      Last Recorded Vitals  Blood pressure 113/56, pulse 64, temperature 36.2 °C (97.2 °F), temperature source Temporal, resp. rate 16, height 1.524 m (5'), weight 54.4 kg (120 lb), SpO2 94%.  Intake/Output last 3 Shifts:  I/O last 3 completed shifts:  In: 1130 (20.8 mL/kg) [P.O.:1080; IV Piggyback:50]  Out: 3075 (56.5 mL/kg) [Urine:3075 (1.6 mL/kg/hr)]  Weight: 54.4 kg     Relevant Results  Results for orders placed or performed during the hospital encounter of 05/23/24 (from the past 96 hour(s))    Urinalysis with Reflex Culture and Microscopic   Result Value Ref Range    Color, Urine Red (N) Straw, Yellow    Appearance, Urine Turbid (N) Clear    Specific Gravity, Urine >1.030 (N) 1.005 - 1.035    pH, Urine 6.5 5.0, 5.5, 6.0, 6.5, 7.0, 7.5, 8.0    Protein, Urine 100 (2+) (A) NEGATIVE, 10 (TRACE) mg/dL    Glucose, Urine NEGATIVE NEGATIVE mg/dL    Blood, Urine 1.0 (3+) (A) NEGATIVE    Ketones, Urine NEGATIVE NEGATIVE mg/dL    Bilirubin, Urine NEGATIVE NEGATIVE    Urobilinogen, Urine NORM NORM mg/dL    Nitrite, Urine NEGATIVE NEGATIVE    Leukocyte Esterase, Urine 250 Wendy/µL (A) NEGATIVE   Extra Urine Gray Tube   Result Value Ref Range    Extra Tube Hold for add-ons.    Microscopic Only, Urine   Result Value Ref Range    WBC, Urine 1-5 1-5, NONE /HPF    RBC, Urine >20 (A) NONE, 1-2, 3-5 /HPF    Squamous Epithelial Cells, Urine 1-9 (SPARSE) Reference range not established. /HPF   Urine Culture    Specimen: Clean Catch/Voided; Urine   Result Value Ref Range    Urine Culture Normal genitourinary rodolfo    Urine electrolytes   Result Value Ref Range    Sodium, Urine Random 69 mmol/L    Sodium/Creatinine Ratio 100 Not established. mmol/g Creat    Potassium, Urine Random 20 mmol/L    Potassium/Creatinine Ratio 29 Not established mmol/g Creat    Chloride, Urine Random 59 mmol/L    Chloride/Creatinine Ratio 86 38 - 318 mmol/g creat    Creatinine, Urine Random 69.0 20.0 - 320.0 mg/dL   Creatinine, Urine Random   Result Value Ref Range    Creatinine, Urine Random 69.0 20.0 - 320.0 mg/dL   CBC and Auto Differential   Result Value Ref Range    WBC 9.4 4.4 - 11.3 x10*3/uL    nRBC 0.0 0.0 - 0.0 /100 WBCs    RBC 2.63 (L) 4.00 - 5.20 x10*6/uL    Hemoglobin 7.8 (L) 12.0 - 16.0 g/dL    Hematocrit 25.7 (L) 36.0 - 46.0 %    MCV 98 80 - 100 fL    MCH 29.7 26.0 - 34.0 pg    MCHC 30.4 (L) 32.0 - 36.0 g/dL    RDW 14.5 11.5 - 14.5 %    Platelets 325 150 - 450 x10*3/uL    Neutrophils % 78.4 40.0 - 80.0 %    Immature Granulocytes %,  Automated 0.4 0.0 - 0.9 %    Lymphocytes % 10.2 13.0 - 44.0 %    Monocytes % 7.5 2.0 - 10.0 %    Eosinophils % 2.6 0.0 - 6.0 %    Basophils % 0.9 0.0 - 2.0 %    Neutrophils Absolute 7.38 (H) 1.60 - 5.50 x10*3/uL    Immature Granulocytes Absolute, Automated 0.04 0.00 - 0.50 x10*3/uL    Lymphocytes Absolute 0.96 0.80 - 3.00 x10*3/uL    Monocytes Absolute 0.71 0.05 - 0.80 x10*3/uL    Eosinophils Absolute 0.24 0.00 - 0.40 x10*3/uL    Basophils Absolute 0.08 0.00 - 0.10 x10*3/uL   Comprehensive metabolic panel   Result Value Ref Range    Glucose 112 (H) 74 - 99 mg/dL    Sodium 139 136 - 145 mmol/L    Potassium 4.8 3.5 - 5.3 mmol/L    Chloride 105 98 - 107 mmol/L    Bicarbonate 24 21 - 32 mmol/L    Anion Gap 15 10 - 20 mmol/L    Urea Nitrogen 55 (H) 6 - 23 mg/dL    Creatinine 1.77 (H) 0.50 - 1.05 mg/dL    eGFR 28 (L) >60 mL/min/1.73m*2    Calcium 9.2 8.6 - 10.3 mg/dL    Albumin 4.0 3.4 - 5.0 g/dL    Alkaline Phosphatase 86 33 - 136 U/L    Total Protein 6.8 6.4 - 8.2 g/dL    AST 13 9 - 39 U/L    Bilirubin, Total 0.3 0.0 - 1.2 mg/dL    ALT 8 7 - 45 U/L   Type and Screen   Result Value Ref Range    ABO TYPE O     Rh TYPE POS     ANTIBODY SCREEN NEG    Coagulation Screen   Result Value Ref Range    Protime 12.8 9.8 - 12.8 seconds    INR 1.1 0.9 - 1.1    aPTT 35 27 - 38 seconds   CBC   Result Value Ref Range    WBC 6.9 4.4 - 11.3 x10*3/uL    nRBC 0.0 0.0 - 0.0 /100 WBCs    RBC 2.08 (L) 4.00 - 5.20 x10*6/uL    Hemoglobin 6.0 (LL) 12.0 - 16.0 g/dL    Hematocrit 20.3 (L) 36.0 - 46.0 %    MCV 98 80 - 100 fL    MCH 28.8 26.0 - 34.0 pg    MCHC 29.6 (L) 32.0 - 36.0 g/dL    RDW 14.5 11.5 - 14.5 %    Platelets 282 150 - 450 x10*3/uL   Basic metabolic panel   Result Value Ref Range    Glucose 79 74 - 99 mg/dL    Sodium 138 136 - 145 mmol/L    Potassium 4.2 3.5 - 5.3 mmol/L    Chloride 107 98 - 107 mmol/L    Bicarbonate 23 21 - 32 mmol/L    Anion Gap 12 10 - 20 mmol/L    Urea Nitrogen 53 (H) 6 - 23 mg/dL    Creatinine 1.53 (H) 0.50 -  1.05 mg/dL    eGFR 33 (L) >60 mL/min/1.73m*2    Calcium 8.6 8.6 - 10.3 mg/dL   POCT GLUCOSE   Result Value Ref Range    POCT Glucose 77 74 - 99 mg/dL   Prepare RBC: 2 Units   Result Value Ref Range    PRODUCT CODE N6607M80     Unit Number [id number]     Unit ABO O     Unit RH POS     XM INTEP COMP     Dispense Status TR     Blood Expiration Date May 29, 2024 23:59 EDT     PRODUCT BLOOD TYPE 5100     UNIT VOLUME 350     PRODUCT CODE E8216Q43     Unit Number [id number]     Unit ABO O     Unit RH POS     XM INTEP COMP     Dispense Status TR     Blood Expiration Date May 29, 2024 23:59 EDT     PRODUCT BLOOD TYPE 5100     UNIT VOLUME 350    CBC   Result Value Ref Range    WBC 6.5 4.4 - 11.3 x10*3/uL    nRBC 0.0 0.0 - 0.0 /100 WBCs    RBC 2.28 (L) 4.00 - 5.20 x10*6/uL    Hemoglobin 6.6 (L) 12.0 - 16.0 g/dL    Hematocrit 22.4 (L) 36.0 - 46.0 %    MCV 98 80 - 100 fL    MCH 28.9 26.0 - 34.0 pg    MCHC 29.5 (L) 32.0 - 36.0 g/dL    RDW 14.6 (H) 11.5 - 14.5 %    Platelets 289 150 - 450 x10*3/uL   POCT GLUCOSE   Result Value Ref Range    POCT Glucose 223 (H) 74 - 99 mg/dL   POCT GLUCOSE   Result Value Ref Range    POCT Glucose 106 (H) 74 - 99 mg/dL   CBC   Result Value Ref Range    WBC 6.9 4.4 - 11.3 x10*3/uL    nRBC 0.0 0.0 - 0.0 /100 WBCs    RBC 3.15 (L) 4.00 - 5.20 x10*6/uL    Hemoglobin 9.5 (L) 12.0 - 16.0 g/dL    Hematocrit 29.2 (L) 36.0 - 46.0 %    MCV 93 80 - 100 fL    MCH 30.2 26.0 - 34.0 pg    MCHC 32.5 32.0 - 36.0 g/dL    RDW 15.7 (H) 11.5 - 14.5 %    Platelets 293 150 - 450 x10*3/uL   POCT GLUCOSE   Result Value Ref Range    POCT Glucose 183 (H) 74 - 99 mg/dL   POCT GLUCOSE   Result Value Ref Range    POCT Glucose 89 74 - 99 mg/dL   CBC and Auto Differential   Result Value Ref Range    WBC 7.2 4.4 - 11.3 x10*3/uL    nRBC 0.0 0.0 - 0.0 /100 WBCs    RBC 2.68 (L) 4.00 - 5.20 x10*6/uL    Hemoglobin 7.8 (L) 12.0 - 16.0 g/dL    Hematocrit 25.4 (L) 36.0 - 46.0 %    MCV 95 80 - 100 fL    MCH 29.1 26.0 - 34.0 pg     MCHC 30.7 (L) 32.0 - 36.0 g/dL    RDW 16.6 (H) 11.5 - 14.5 %    Platelets 266 150 - 450 x10*3/uL    Neutrophils % 67.7 40.0 - 80.0 %    Immature Granulocytes %, Automated 0.6 0.0 - 0.9 %    Lymphocytes % 14.7 13.0 - 44.0 %    Monocytes % 11.1 2.0 - 10.0 %    Eosinophils % 5.1 0.0 - 6.0 %    Basophils % 0.8 0.0 - 2.0 %    Neutrophils Absolute 4.89 1.60 - 5.50 x10*3/uL    Immature Granulocytes Absolute, Automated 0.04 0.00 - 0.50 x10*3/uL    Lymphocytes Absolute 1.06 0.80 - 3.00 x10*3/uL    Monocytes Absolute 0.80 0.05 - 0.80 x10*3/uL    Eosinophils Absolute 0.37 0.00 - 0.40 x10*3/uL    Basophils Absolute 0.06 0.00 - 0.10 x10*3/uL   Basic Metabolic Panel   Result Value Ref Range    Glucose 93 74 - 99 mg/dL    Sodium 139 136 - 145 mmol/L    Potassium 4.5 3.5 - 5.3 mmol/L    Chloride 108 (H) 98 - 107 mmol/L    Bicarbonate 24 21 - 32 mmol/L    Anion Gap 12 10 - 20 mmol/L    Urea Nitrogen 53 (H) 6 - 23 mg/dL    Creatinine 1.38 (H) 0.50 - 1.05 mg/dL    eGFR 38 (L) >60 mL/min/1.73m*2    Calcium 8.8 8.6 - 10.3 mg/dL   POCT GLUCOSE   Result Value Ref Range    POCT Glucose 112 (H) 74 - 99 mg/dL   Coagulation Screen   Result Value Ref Range    Protime 12.3 9.8 - 12.8 seconds    INR 1.1 0.9 - 1.1    aPTT 32 27 - 38 seconds   CBC   Result Value Ref Range    WBC 7.9 4.4 - 11.3 x10*3/uL    nRBC 0.0 0.0 - 0.0 /100 WBCs    RBC 2.99 (L) 4.00 - 5.20 x10*6/uL    Hemoglobin 8.9 (L) 12.0 - 16.0 g/dL    Hematocrit 28.4 (L) 36.0 - 46.0 %    MCV 95 80 - 100 fL    MCH 29.8 26.0 - 34.0 pg    MCHC 31.3 (L) 32.0 - 36.0 g/dL    RDW 16.4 (H) 11.5 - 14.5 %    Platelets 289 150 - 450 x10*3/uL   POCT GLUCOSE   Result Value Ref Range    POCT Glucose 145 (H) 74 - 99 mg/dL   POCT GLUCOSE   Result Value Ref Range    POCT Glucose 207 (H) 74 - 99 mg/dL   POCT GLUCOSE   Result Value Ref Range    POCT Glucose 89 74 - 99 mg/dL   CBC and Auto Differential   Result Value Ref Range    WBC 6.5 4.4 - 11.3 x10*3/uL    nRBC 0.0 0.0 - 0.0 /100 WBCs    RBC 2.70 (L)  4.00 - 5.20 x10*6/uL    Hemoglobin 7.9 (L) 12.0 - 16.0 g/dL    Hematocrit 26.0 (L) 36.0 - 46.0 %    MCV 96 80 - 100 fL    MCH 29.3 26.0 - 34.0 pg    MCHC 30.4 (L) 32.0 - 36.0 g/dL    RDW 16.0 (H) 11.5 - 14.5 %    Platelets 278 150 - 450 x10*3/uL    Neutrophils % 61.6 40.0 - 80.0 %    Immature Granulocytes %, Automated 0.6 0.0 - 0.9 %    Lymphocytes % 19.6 13.0 - 44.0 %    Monocytes % 10.3 2.0 - 10.0 %    Eosinophils % 6.8 0.0 - 6.0 %    Basophils % 1.1 0.0 - 2.0 %    Neutrophils Absolute 4.00 1.60 - 5.50 x10*3/uL    Immature Granulocytes Absolute, Automated 0.04 0.00 - 0.50 x10*3/uL    Lymphocytes Absolute 1.27 0.80 - 3.00 x10*3/uL    Monocytes Absolute 0.67 0.05 - 0.80 x10*3/uL    Eosinophils Absolute 0.44 (H) 0.00 - 0.40 x10*3/uL    Basophils Absolute 0.07 0.00 - 0.10 x10*3/uL   Basic Metabolic Panel   Result Value Ref Range    Glucose 85 74 - 99 mg/dL    Sodium 141 136 - 145 mmol/L    Potassium 4.6 3.5 - 5.3 mmol/L    Chloride 108 (H) 98 - 107 mmol/L    Bicarbonate 26 21 - 32 mmol/L    Anion Gap 12 10 - 20 mmol/L    Urea Nitrogen 43 (H) 6 - 23 mg/dL    Creatinine 1.27 (H) 0.50 - 1.05 mg/dL    eGFR 42 (L) >60 mL/min/1.73m*2    Calcium 8.7 8.6 - 10.3 mg/dL   POCT GLUCOSE   Result Value Ref Range    POCT Glucose 96 74 - 99 mg/dL   POCT GLUCOSE   Result Value Ref Range    POCT Glucose 86 74 - 99 mg/dL   POCT GLUCOSE   Result Value Ref Range    POCT Glucose 212 (H) 74 - 99 mg/dL   POCT GLUCOSE   Result Value Ref Range    POCT Glucose 84 74 - 99 mg/dL   Magnesium   Result Value Ref Range    Magnesium 1.98 1.60 - 2.40 mg/dL   CBC   Result Value Ref Range    WBC 8.2 4.4 - 11.3 x10*3/uL    nRBC 0.0 0.0 - 0.0 /100 WBCs    RBC 2.73 (L) 4.00 - 5.20 x10*6/uL    Hemoglobin 8.1 (L) 12.0 - 16.0 g/dL    Hematocrit 26.3 (L) 36.0 - 46.0 %    MCV 96 80 - 100 fL    MCH 29.7 26.0 - 34.0 pg    MCHC 30.8 (L) 32.0 - 36.0 g/dL    RDW 15.6 (H) 11.5 - 14.5 %    Platelets 267 150 - 450 x10*3/uL   Basic Metabolic Panel   Result Value Ref  Range    Glucose 91 74 - 99 mg/dL    Sodium 139 136 - 145 mmol/L    Potassium 4.7 3.5 - 5.3 mmol/L    Chloride 109 (H) 98 - 107 mmol/L    Bicarbonate 25 21 - 32 mmol/L    Anion Gap 10 10 - 20 mmol/L    Urea Nitrogen 38 (H) 6 - 23 mg/dL    Creatinine 1.26 (H) 0.50 - 1.05 mg/dL    eGFR 42 (L) >60 mL/min/1.73m*2    Calcium 8.6 8.6 - 10.3 mg/dL       US renal complete    Result Date: 5/24/2024  Interpreted By:  Dior Roe, STUDY: US RENAL COMPLETE;  5/24/2024 8:53 am   INDICATION: Signs/Symptoms:Active hematuria, known bladder CA.   COMPARISON: 01/18/2024   ACCESSION NUMBER(S): [id number]   ORDERING CLINICIAN: HODAN MCKENZIE   TECHNIQUE: Multiple images of the kidneys were obtained.   FINDINGS: RIGHT KIDNEY: The right kidney is  normal in size measuring  9.1 cm in length.   The echogenicity of the cortex of the right kidney is  within normal limits. There is a 3.0 cm simple parapelvic cyst in the lower pole. There is a 2.4 cm simple cyst in the cortex in the interpolar region of the right kidney. There is a 1.8 cm simple exophytic upper pole right renal cyst. 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 There is no intrarenal calculus or hydronephrosis.   LEFT KIDNEY: The left kidney is  normal in size measuring  9.1 cm in length.   The echogenicity of the cortex of the left kidney is  within normal limits. There is no renal mass. There is no intrarenal calculus or hydronephrosis.   BLADDER: There is a large polypoid mass in the bladder consistent with the patient's known bladder carcinoma. This measures 6.4 x 5.5 x 3.8 cm. There is bladder wall thickening and trabeculation. There is debris in the bladder.       1. Simple right renal cysts. 2. 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 3. Large polypoid mass in the bladder consistent with the patient's known history of bladder carcinoma.   MACRO:  None   Signed by: Dior Roe 5/24/2024 9:05 AM Dictation workstation:   [id number]       Assessment/Plan   Gross hematuria/Bladder mass  -cysto TURBT Tuesday or Wednesday  -NPO after midnight    Sukhjinder Gauthier PA-C

## 2024-05-27 NOTE — CARE PLAN
Problem: Pain - Adult  Goal: Verbalizes/displays adequate comfort level or baseline comfort level  Outcome: Progressing     Problem: Safety - Adult  Goal: Free from fall injury  Outcome: Progressing     Problem: Discharge Planning  Goal: Discharge to home or other facility with appropriate resources  Outcome: Progressing     Problem: Chronic Conditions and Co-morbidities  Goal: Patient's chronic conditions and co-morbidity symptoms are monitored and maintained or improved  Outcome: Progressing     Problem: Diabetes  Goal: Achieve decreasing blood glucose levels by end of shift  Outcome: Progressing  Goal: Increase stability of blood glucose readings by end of shift  Outcome: Progressing  Goal: Decrease in ketones present in urine by end of shift  Outcome: Progressing  Goal: Maintain electrolyte levels within acceptable range throughout shift  Outcome: Progressing  Goal: Maintain glucose levels >70mg/dl to <250mg/dl throughout shift  Outcome: Progressing  Goal: No changes in neurological exam by end of shift  Outcome: Progressing  Goal: Learn about and adhere to nutrition recommendations by end of shift  Outcome: Progressing  Goal: Vital signs within normal range for age by end of shift  Outcome: Progressing  Goal: Increase self care and/or family involovement by end of shift  Outcome: Progressing  Goal: Receive DSME education by end of shift  Outcome: Progressing

## 2024-05-27 NOTE — PROGRESS NOTES
Medicine Progress Note    Subjective   NAEON, NAD, patient resting comfortably in her bed, endorsing progressive lightening of her urine from cass red blood on admission to light pink-tinged urine now.  Denying CP, SOB, abdominal pain, N/V, blood clot passage.  She is anticipating for the TURP to be done tomorrow, procedure not yet formally scheduled but anticipating to be done in the next 2 days per urology.    Objective   Patient Vitals for the past 24 hrs:   BP Temp Temp src Pulse Resp SpO2   05/27/24 0957 118/54 36.2 °C (97.2 °F) -- 67 16 95 %   05/27/24 0612 113/56 36.2 °C (97.2 °F) Temporal 64 16 94 %   05/26/24 2204 (!) 110/49 36.6 °C (97.9 °F) -- 76 -- 98 %   05/26/24 1738 121/56 37 °C (98.6 °F) Temporal 66 18 94 %   05/26/24 1346 106/53 36.5 °C (97.7 °F) Temporal 88 16 100 %   05/26/24 1200 -- -- -- -- -- 100 %      Labs, radiological imaging and cardiac work up were personally reviewed    Ventilator/Oxygen Supply:     Oxygen Therapy/Pulse Ox  Medical Gas Therapy: Supplemental oxygen  O2 Delivery Method: Nasal cannula  SpO2: 95 %  Patient Activity During SpO2 Measurement: At rest  Temp: 36.2 °C (97.2 °F)    Labs:   Results from last 7 days   Lab Units 05/27/24  0646 05/26/24  0648 05/25/24  0700   SODIUM mmol/L 139 141 139   POTASSIUM mmol/L 4.7 4.6 4.5   CHLORIDE mmol/L 109* 108* 108*   CO2 mmol/L 25 26 24   BUN mg/dL 38* 43* 53*   CREATININE mg/dL 1.26* 1.27* 1.38*   GLUCOSE mg/dL 91 85 93   CALCIUM mg/dL 8.6 8.7 8.8     Results from last 7 days   Lab Units 05/27/24  0646   WBC AUTO x10*3/uL 8.2   HEMOGLOBIN g/dL 8.1*   HEMATOCRIT % 26.3*   PLATELETS AUTO x10*3/uL 267   Imaging:  No results found.  US renal complete   Final Result   1. Simple right renal cysts.   2. 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   3. Large polypoid mass in the bladder consistent with the patient's   known history of bladder carcinoma.        MACRO:   None        Signed by: Dior Roe 5/24/2024 9:05 AM   Dictation  workstation:   [id number]        Medications:  Inpatient scheduled medications:  atorvastatin, 40 mg, oral, Nightly  [Held by provider] clopidogrel, 75 mg, oral, Daily  [Held by provider] furosemide, 20 mg, oral, Daily  insulin lispro, 0-5 Units, subcutaneous, TID  levothyroxine, 75 mcg, oral, Daily before breakfast  [Held by provider] lisinopril, 2.5 mg, oral, Daily  polyethylene glycol, 17 g, oral, Daily    Inpatient PRN medications:  PRN medications: dextrose, dextrose, glucagon, glucagon  Inpatient continuous medications:      Physical Exam:   GENERAL: Awake/alert, cooperative, conversant, NAD.  HEAD:  Normocephalic, atraumatic.  EYES:  Round and reactive. Anicteric.  ENT:  No nasal discharge, mucous membranes moist and pink.  NECK:  Atraumatic, no meningismus. No JVD, cervical lymphadenopathy bilaterally.  CARDIOVASCULAR: HRRR, noncyanotic.  RESPIRATORY: CTAB without wheezes, rhonchi, rales.  On 1 LNC, no acute respiratory distress.  ABDOMEN:  Soft, + suprapubic tenderness, nondistended, hypoactive bowel sounds.  EXTREMITIES:  No peripheral edema, no calf tenderness. Peripheral pulses intact. Scattered bruises.   SKIN:  Warm and dry. No tenting. No rash or open wound noted.  NEUROLOGICAL:  Nonfocal grossly.  A&O x 4. CN II-XII grossly intact.    Assessment/Plan   Lupe Oshea is a 84 y.o. year old female with a PMHx of primary bladder CA, HTN, HLD, HFrEF (40 to 45% EF with multiple regional wall motion abnormalities and mild to moderate aortic valve regurgitation and recent echo) hypothyroidism, non-insulin-dependent T2DM, CKD stage III, CAD, recent NSTEMI (no stent placement, medically managed on DAPT), COPD on home O2 with chronic hypoxic respiratory failure, iron deficiency anemia, ambulatory dysfunction, former tobacco use, recent hospital admission for mechanical fall, confusion, PNA who [facility] for hematuria.  Her PCP is Dr. Spring.     Daily updates  5/25/2024: No significant changes  to plan, hemoglobin went down slightly we will recheck CBC  5/26/2024: No significant changes to plan, continue to monitor patient until TURBT scheduled on Tuesday/Wednesday, blood level stable    #Hematuria, resolving  #UTI/asymptomatic bacteriuria, resolved  #Primary bladder CA  #CKD III, questionable LAUREN  #Acute on chronic anemia secondary to hematuria  -Has been seeing OP urology at [facility] - was found to have a bladder mass concerning for malignancy on renal US previously.  CTAP 3/18/2024 demonstrated lobular bladder mass consistent with a primary bladder CA, no extension through the bladder wall, no pelvic or extra-pelvic lymphadenopathy or evidence of metastatic disease.  Patient reports of plans for cystoscopy, for which she has been cleared by her cardiologist (Dr. Krueger) for, however, nothing is scheduled yet.  Per chart review, cardiologist advised patient to discontinue aspirin on 5/22 and son was made aware  -Perioperative risk stratification:  Class III Risk per RCRI  Patient is at moderate risk but acceptable to proceed with surgery per regular cardiologist Dr. Krueger  -Trend renal function labs and UOP, avoid nephrotoxins.  Urine electrolytes and urine creatinine ordered.  Renal US results as above.  Indeterminate FENa 1.1%.  UA positive for blood and leukocyte esterase, symptomatic for UTI however may be confounded in the setting of hematuria, treated for uncomplicated UTI with empiric coverage  -Urology consulted, appreciate recs  Avoid lai catheterization/CBI unless unable to void  TURBT Tuesday/Wednesday, n.p.o. at midnight  -Heme/onc following, no significant plans at this point, believes anemia is likely 2/2 persistent hematuria, chronic renal disease, AC use     Chronic:  CAD and recent NSTEMI without stenting: Holding Plavix, continue statin  Hypertension and CKD III: Holding home lisinopril  Hypothyroidism: Continue home levothyroxine  HFrEF: Holding home Lasix 20 mg  daily  Non-insulin-dependent T2DM: Hold home metformin, SSI mild  Tobacco use disorder, former smoker  Iron deficiency anemia  Ambulatory dysfunction    Checklist:  Antimicrobials: -  Oxygen: 0.5 LNC  Feeding: Adult diet regular  Fluids: Defer  DVT ppx:  SCDs  Glycemic control: SSI mild  Bowel care: MiraLAX  LDAs: PIV  Code Status: DNR and No Intubation    This is a preliminary note written by the resident. Please wait for attending addendum for finalization of note and recommendations.

## 2024-05-27 NOTE — CARE PLAN
Problem: Pain - Adult  Goal: Verbalizes/displays adequate comfort level or baseline comfort level  5/27/2024 0137 by Letty Junior RN  Outcome: Progressing  5/27/2024 0137 by Letty Junior RN  Outcome: Progressing     Problem: Safety - Adult  Goal: Free from fall injury  Outcome: Progressing   The patient's goals for the shift include decrease in hematuria    The clinical goals for the shift include pt will remain hemodynamically stable throughout shift

## 2024-05-28 ENCOUNTER — ANESTHESIA (OUTPATIENT)
Dept: OPERATING ROOM | Facility: HOSPITAL | Age: 84
DRG: 669 | End: 2024-05-28
Payer: MEDICARE

## 2024-05-28 ENCOUNTER — ANESTHESIA EVENT (OUTPATIENT)
Dept: OPERATING ROOM | Facility: HOSPITAL | Age: 84
DRG: 669 | End: 2024-05-28
Payer: MEDICARE

## 2024-05-28 LAB
ANION GAP SERPL CALC-SCNC: 11 MMOL/L (ref 10–20)
BASOPHILS # BLD AUTO: 0.06 X10*3/UL (ref 0–0.1)
BASOPHILS NFR BLD AUTO: 0.7 %
BUN SERPL-MCNC: 44 MG/DL (ref 6–23)
CALCIUM SERPL-MCNC: 8.7 MG/DL (ref 8.6–10.3)
CHLORIDE SERPL-SCNC: 112 MMOL/L (ref 98–107)
CO2 SERPL-SCNC: 26 MMOL/L (ref 21–32)
CREAT SERPL-MCNC: 1.53 MG/DL (ref 0.5–1.05)
EGFRCR SERPLBLD CKD-EPI 2021: 33 ML/MIN/1.73M*2
EOSINOPHIL # BLD AUTO: 0.47 X10*3/UL (ref 0–0.4)
EOSINOPHIL NFR BLD AUTO: 5.7 %
ERYTHROCYTE [DISTWIDTH] IN BLOOD BY AUTOMATED COUNT: 15.3 % (ref 11.5–14.5)
GLUCOSE BLD MANUAL STRIP-MCNC: 144 MG/DL (ref 74–99)
GLUCOSE BLD MANUAL STRIP-MCNC: 249 MG/DL (ref 74–99)
GLUCOSE BLD MANUAL STRIP-MCNC: 84 MG/DL (ref 74–99)
GLUCOSE BLD MANUAL STRIP-MCNC: 97 MG/DL (ref 74–99)
GLUCOSE SERPL-MCNC: 95 MG/DL (ref 74–99)
HCT VFR BLD AUTO: 25.9 % (ref 36–46)
HGB BLD-MCNC: 8 G/DL (ref 12–16)
IMM GRANULOCYTES # BLD AUTO: 0.03 X10*3/UL (ref 0–0.5)
IMM GRANULOCYTES NFR BLD AUTO: 0.4 % (ref 0–0.9)
LYMPHOCYTES # BLD AUTO: 1.04 X10*3/UL (ref 0.8–3)
LYMPHOCYTES NFR BLD AUTO: 12.6 %
MAGNESIUM SERPL-MCNC: 2.03 MG/DL (ref 1.6–2.4)
MCH RBC QN AUTO: 30.2 PG (ref 26–34)
MCHC RBC AUTO-ENTMCNC: 30.9 G/DL (ref 32–36)
MCV RBC AUTO: 98 FL (ref 80–100)
MONOCYTES # BLD AUTO: 0.83 X10*3/UL (ref 0.05–0.8)
MONOCYTES NFR BLD AUTO: 10.1 %
NEUTROPHILS # BLD AUTO: 5.82 X10*3/UL (ref 1.6–5.5)
NEUTROPHILS NFR BLD AUTO: 70.5 %
NRBC BLD-RTO: 0 /100 WBCS (ref 0–0)
PLATELET # BLD AUTO: 241 X10*3/UL (ref 150–450)
POTASSIUM SERPL-SCNC: 4.8 MMOL/L (ref 3.5–5.3)
RBC # BLD AUTO: 2.65 X10*6/UL (ref 4–5.2)
SODIUM SERPL-SCNC: 144 MMOL/L (ref 136–145)
WBC # BLD AUTO: 8.3 X10*3/UL (ref 4.4–11.3)

## 2024-05-28 PROCEDURE — 1100000001 HC PRIVATE ROOM DAILY

## 2024-05-28 PROCEDURE — 2500000002 HC RX 250 W HCPCS SELF ADMINISTERED DRUGS (ALT 637 FOR MEDICARE OP, ALT 636 FOR OP/ED): Mod: MUE

## 2024-05-28 PROCEDURE — 2500000001 HC RX 250 WO HCPCS SELF ADMINISTERED DRUGS (ALT 637 FOR MEDICARE OP)

## 2024-05-28 PROCEDURE — 3600000003 HC OR TIME - INITIAL BASE CHARGE - PROCEDURE LEVEL THREE: Performed by: UROLOGY

## 2024-05-28 PROCEDURE — 99232 SBSQ HOSP IP/OBS MODERATE 35: CPT

## 2024-05-28 PROCEDURE — 85025 COMPLETE CBC W/AUTO DIFF WBC: CPT

## 2024-05-28 PROCEDURE — 7100000002 HC RECOVERY ROOM TIME - EACH INCREMENTAL 1 MINUTE: Performed by: UROLOGY

## 2024-05-28 PROCEDURE — 2500000005 HC RX 250 GENERAL PHARMACY W/O HCPCS

## 2024-05-28 PROCEDURE — 7100000001 HC RECOVERY ROOM TIME - INITIAL BASE CHARGE: Performed by: UROLOGY

## 2024-05-28 PROCEDURE — 83735 ASSAY OF MAGNESIUM: CPT

## 2024-05-28 PROCEDURE — 88307 TISSUE EXAM BY PATHOLOGIST: CPT | Mod: TC,PARLAB | Performed by: PHYSICIAN ASSISTANT

## 2024-05-28 PROCEDURE — 2500000004 HC RX 250 GENERAL PHARMACY W/ HCPCS (ALT 636 FOR OP/ED)

## 2024-05-28 PROCEDURE — 2720000007 HC OR 272 NO HCPCS: Performed by: UROLOGY

## 2024-05-28 PROCEDURE — 99233 SBSQ HOSP IP/OBS HIGH 50: CPT

## 2024-05-28 PROCEDURE — 3700000002 HC GENERAL ANESTHESIA TIME - EACH INCREMENTAL 1 MINUTE: Performed by: UROLOGY

## 2024-05-28 PROCEDURE — 3600000008 HC OR TIME - EACH INCREMENTAL 1 MINUTE - PROCEDURE LEVEL THREE: Performed by: UROLOGY

## 2024-05-28 PROCEDURE — 36415 COLL VENOUS BLD VENIPUNCTURE: CPT

## 2024-05-28 PROCEDURE — 93010 ELECTROCARDIOGRAM REPORT: CPT | Performed by: STUDENT IN AN ORGANIZED HEALTH CARE EDUCATION/TRAINING PROGRAM

## 2024-05-28 PROCEDURE — 3700000001 HC GENERAL ANESTHESIA TIME - INITIAL BASE CHARGE: Performed by: UROLOGY

## 2024-05-28 PROCEDURE — 82947 ASSAY GLUCOSE BLOOD QUANT: CPT | Mod: 91

## 2024-05-28 PROCEDURE — 80048 BASIC METABOLIC PNL TOTAL CA: CPT

## 2024-05-28 PROCEDURE — 0TBB8ZZ EXCISION OF BLADDER, VIA NATURAL OR ARTIFICIAL OPENING ENDOSCOPIC: ICD-10-PCS | Performed by: UROLOGY

## 2024-05-28 RX ORDER — ONDANSETRON HYDROCHLORIDE 2 MG/ML
INJECTION, SOLUTION INTRAVENOUS AS NEEDED
Status: DISCONTINUED | OUTPATIENT
Start: 2024-05-28 | End: 2024-05-28

## 2024-05-28 RX ORDER — SODIUM CHLORIDE, SODIUM LACTATE, POTASSIUM CHLORIDE, CALCIUM CHLORIDE 600; 310; 30; 20 MG/100ML; MG/100ML; MG/100ML; MG/100ML
INJECTION, SOLUTION INTRAVENOUS CONTINUOUS PRN
Status: DISCONTINUED | OUTPATIENT
Start: 2024-05-28 | End: 2024-05-28

## 2024-05-28 RX ORDER — FENTANYL CITRATE 50 UG/ML
INJECTION, SOLUTION INTRAMUSCULAR; INTRAVENOUS AS NEEDED
Status: DISCONTINUED | OUTPATIENT
Start: 2024-05-28 | End: 2024-05-28

## 2024-05-28 RX ORDER — MEPERIDINE HYDROCHLORIDE 25 MG/ML
12.5 INJECTION INTRAMUSCULAR; INTRAVENOUS; SUBCUTANEOUS EVERY 10 MIN PRN
Status: DISCONTINUED | OUTPATIENT
Start: 2024-05-28 | End: 2024-05-28 | Stop reason: HOSPADM

## 2024-05-28 RX ORDER — ALBUTEROL SULFATE 0.83 MG/ML
2.5 SOLUTION RESPIRATORY (INHALATION) ONCE AS NEEDED
Status: DISCONTINUED | OUTPATIENT
Start: 2024-05-28 | End: 2024-05-28 | Stop reason: HOSPADM

## 2024-05-28 RX ORDER — LIDOCAINE HYDROCHLORIDE 10 MG/ML
0.1 INJECTION INFILTRATION; PERINEURAL ONCE
Status: DISCONTINUED | OUTPATIENT
Start: 2024-05-28 | End: 2024-05-28 | Stop reason: HOSPADM

## 2024-05-28 RX ORDER — ACETAMINOPHEN 325 MG/1
650 TABLET ORAL EVERY 4 HOURS PRN
Status: DISCONTINUED | OUTPATIENT
Start: 2024-05-28 | End: 2024-05-28 | Stop reason: HOSPADM

## 2024-05-28 RX ORDER — LABETALOL HYDROCHLORIDE 5 MG/ML
5 INJECTION, SOLUTION INTRAVENOUS ONCE AS NEEDED
Status: DISCONTINUED | OUTPATIENT
Start: 2024-05-28 | End: 2024-05-28 | Stop reason: HOSPADM

## 2024-05-28 RX ORDER — HYDRALAZINE HYDROCHLORIDE 20 MG/ML
5 INJECTION INTRAMUSCULAR; INTRAVENOUS EVERY 30 MIN PRN
Status: DISCONTINUED | OUTPATIENT
Start: 2024-05-28 | End: 2024-05-28 | Stop reason: HOSPADM

## 2024-05-28 RX ORDER — PROPOFOL 10 MG/ML
INJECTION, EMULSION INTRAVENOUS AS NEEDED
Status: DISCONTINUED | OUTPATIENT
Start: 2024-05-28 | End: 2024-05-28

## 2024-05-28 RX ORDER — CIPROFLOXACIN 250 MG/1
250 TABLET, FILM COATED ORAL EVERY 12 HOURS SCHEDULED
Status: DISCONTINUED | OUTPATIENT
Start: 2024-05-28 | End: 2024-05-29 | Stop reason: HOSPADM

## 2024-05-28 RX ORDER — ONDANSETRON HYDROCHLORIDE 2 MG/ML
4 INJECTION, SOLUTION INTRAVENOUS ONCE AS NEEDED
Status: DISCONTINUED | OUTPATIENT
Start: 2024-05-28 | End: 2024-05-28 | Stop reason: HOSPADM

## 2024-05-28 RX ORDER — LIDOCAINE HCL/PF 100 MG/5ML
SYRINGE (ML) INTRAVENOUS AS NEEDED
Status: DISCONTINUED | OUTPATIENT
Start: 2024-05-28 | End: 2024-05-28

## 2024-05-28 RX ORDER — SODIUM CHLORIDE, SODIUM LACTATE, POTASSIUM CHLORIDE, CALCIUM CHLORIDE 600; 310; 30; 20 MG/100ML; MG/100ML; MG/100ML; MG/100ML
100 INJECTION, SOLUTION INTRAVENOUS CONTINUOUS
Status: DISCONTINUED | OUTPATIENT
Start: 2024-05-28 | End: 2024-05-28 | Stop reason: HOSPADM

## 2024-05-28 RX ADMIN — ATORVASTATIN CALCIUM 40 MG: 40 TABLET, FILM COATED ORAL at 20:31

## 2024-05-28 RX ADMIN — PROPOFOL 30 MG: 10 INJECTION, EMULSION INTRAVENOUS at 13:43

## 2024-05-28 RX ADMIN — FENTANYL CITRATE 25 MCG: 50 INJECTION, SOLUTION INTRAMUSCULAR; INTRAVENOUS at 13:41

## 2024-05-28 RX ADMIN — CIPROFLOXACIN HYDROCHLORIDE 250 MG: 250 TABLET, FILM COATED ORAL at 20:31

## 2024-05-28 RX ADMIN — SODIUM CHLORIDE, SODIUM LACTATE, POTASSIUM CHLORIDE, AND CALCIUM CHLORIDE: 600; 310; 30; 20 INJECTION, SOLUTION INTRAVENOUS at 13:09

## 2024-05-28 RX ADMIN — PROPOFOL 25 MG: 10 INJECTION, EMULSION INTRAVENOUS at 14:01

## 2024-05-28 RX ADMIN — LIDOCAINE HYDROCHLORIDE 50 MG: 20 INJECTION INTRAVENOUS at 13:25

## 2024-05-28 RX ADMIN — CIPROFLOXACIN HYDROCHLORIDE 250 MG: 250 TABLET, FILM COATED ORAL at 16:29

## 2024-05-28 RX ADMIN — FENTANYL CITRATE 50 MCG: 50 INJECTION, SOLUTION INTRAMUSCULAR; INTRAVENOUS at 13:43

## 2024-05-28 RX ADMIN — DEXAMETHASONE SODIUM PHOSPHATE 4 MG: 4 INJECTION, SOLUTION INTRAMUSCULAR; INTRAVENOUS at 13:30

## 2024-05-28 RX ADMIN — PROPOFOL 25 MG: 10 INJECTION, EMULSION INTRAVENOUS at 14:03

## 2024-05-28 RX ADMIN — ONDANSETRON 4 MG: 2 INJECTION INTRAMUSCULAR; INTRAVENOUS at 14:10

## 2024-05-28 RX ADMIN — PROPOFOL 90 MG: 10 INJECTION, EMULSION INTRAVENOUS at 13:25

## 2024-05-28 RX ADMIN — FENTANYL CITRATE 25 MCG: 50 INJECTION, SOLUTION INTRAMUSCULAR; INTRAVENOUS at 13:25

## 2024-05-28 SDOH — HEALTH STABILITY: MENTAL HEALTH: CURRENT SMOKER: 0

## 2024-05-28 ASSESSMENT — COGNITIVE AND FUNCTIONAL STATUS - GENERAL
DAILY ACTIVITIY SCORE: 24
CLIMB 3 TO 5 STEPS WITH RAILING: A LITTLE
MOBILITY SCORE: 23

## 2024-05-28 ASSESSMENT — PAIN SCALES - GENERAL
PAINLEVEL_OUTOF10: 0 - NO PAIN
PAIN_LEVEL: 0
PAINLEVEL_OUTOF10: 0 - NO PAIN

## 2024-05-28 ASSESSMENT — PAIN - FUNCTIONAL ASSESSMENT
PAIN_FUNCTIONAL_ASSESSMENT: 0-10
PAIN_FUNCTIONAL_ASSESSMENT: 0-10

## 2024-05-28 NOTE — CARE PLAN
The patient's goals for the shift include decrease in hematuria    The clinical goals for the shift include patient urine will return to clear and without clots    Over the shift, the patient did not make progress toward the following goals.  Recommendations to address these barriers include continue to monitor.

## 2024-05-28 NOTE — ANESTHESIA PREPROCEDURE EVALUATION
Patient: Lupe Oshea    Procedure Information       Anesthesia Start Date/Time: 05/28/24 1319    Procedure: CYSTOSCOPY/ TRANSURETHRAL RESECTION OF BLADDER TUMOR (Bladder)    Location: [facility] / [facility]    Surgeons: Say George MD            Relevant Problems   Anesthesia (within normal limits)      Cardiac   (+) Benign essential hypertension   (+) Congestive heart failure (Multi)   (+) Hypercholesterolemia   (+) Ventricular bigeminy      Pulmonary   (+) Acute exacerbation of chronic obstructive pulmonary disease (Multi)      Endocrine   (+) Hypothyroidism   (+) Type 2 diabetes mellitus (Multi)      Hematology   (+) Deep vein thrombosis (DVT) of lower extremity (Multi)      HEENT   (+) Hearing loss       Clinical information reviewed:   Tobacco  Allergies  Meds   Med Hx  Surg Hx   Fam Hx  Soc Hx        NPO Detail:  NPO/Void Status  Date of Last Liquid: 05/27/24  Time of Last Liquid: 2300  Date of Last Solid: 05/27/24  Time of Last Solid: 2100         Physical Exam    Airway  Mallampati: II  TM distance: >3 FB  Neck ROM: full     Cardiovascular   Rhythm: regular  Rate: normal     Dental - normal exam     Pulmonary    Abdominal        Anesthesia Plan    History of general anesthesia?: yes  History of complications of general anesthesia?: no    ASA 3     general     The patient is not a current smoker.  Patient was not previously instructed to abstain from smoking on day of procedure.  Patient did not smoke on day of procedure.    intravenous induction   Postoperative administration of opioids is intended.  Anesthetic plan and risks discussed with patient.  Use of blood products discussed with patient who.    Plan discussed with CRNA and CAA.

## 2024-05-28 NOTE — ANESTHESIA PROCEDURE NOTES
Airway  Date/Time: 5/28/2024 1:29 PM  Urgency: elective    Airway not difficult    Staffing  Performed: [facility]   Authorized by: Juan Manuel Lassiter MD    Performed by: Nusrat Cheema  Patient location during procedure: OR    Indications and Patient Condition  Indications for airway management: anesthesia and airway protection  Spontaneous ventilation: present  Sedation level: deep  Preoxygenated: yes  Patient position: reverse Trendelenburg  Mask difficulty assessment: 0 - not attempted  Planned trial extubation    Final Airway Details  Final airway type: supraglottic airway      Successful airway: classic  Size 4     Number of attempts at approach: 1    Additional Comments  Lips and teeth intact

## 2024-05-28 NOTE — PROGRESS NOTES
Medicine Progress Note    Subjective   NAEON, NAD, patient resting comfortably in her bed and continues to endorse progressive lightening of her urine from cass red blood on admission to light pink-tinged urine now.  Denying CP, SOB, abdominal pain, N/V, blood clot passage.  She is anticipating for the TURBT to be done today at 2pm.      Objective   Patient Vitals for the past 24 hrs:   BP Temp Temp src Pulse Resp SpO2   05/28/24 1507 137/63 36 °C (96.8 °F) -- 61 16 99 %   05/28/24 1445 133/60 -- -- 54 16 98 %   05/28/24 1430 147/65 -- -- 69 16 100 %   05/28/24 1421 154/66 36 °C (96.8 °F) -- 75 12 100 %   05/28/24 1200 -- -- -- -- -- 99 %   05/28/24 0946 110/51 36.2 °C (97.2 °F) Temporal 66 16 99 %   05/28/24 0606 122/60 36.2 °C (97.2 °F) Temporal 74 16 98 %   05/28/24 0210 137/63 36.6 °C (97.9 °F) Temporal 85 16 99 %   05/28/24 0000 -- -- -- -- -- 99 %   05/27/24 2115 111/53 36.9 °C (98.4 °F) Temporal 92 16 94 %   05/27/24 1820 126/59 36.4 °C (97.5 °F) Temporal 75 16 93 %      Labs, radiological imaging and cardiac work up were personally reviewed    Ventilator/Oxygen Supply:     Oxygen Therapy/Pulse Ox  Medical Gas Therapy: Supplemental oxygen  O2 Delivery Method: Nasal cannula  SpO2: 99 %  Patient Activity During SpO2 Measurement: At rest  Temp: 36 °C (96.8 °F)    Labs:   Results from last 7 days   Lab Units 05/28/24  0905 05/27/24  0646 05/26/24  0648   SODIUM mmol/L 144 139 141   POTASSIUM mmol/L 4.8 4.7 4.6   CHLORIDE mmol/L 112* 109* 108*   CO2 mmol/L 26 25 26   BUN mg/dL 44* 38* 43*   CREATININE mg/dL 1.53* 1.26* 1.27*   GLUCOSE mg/dL 95 91 85   CALCIUM mg/dL 8.7 8.6 8.7     Results from last 7 days   Lab Units 05/28/24  0905   WBC AUTO x10*3/uL 8.3   HEMOGLOBIN g/dL 8.0*   HEMATOCRIT % 25.9*   PLATELETS AUTO x10*3/uL 241     Imaging:  No results found.  US renal complete   Final Result   1. Simple right renal cysts.   2. 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   3. Large polypoid mass in the bladder  consistent with the patient's   known history of bladder carcinoma.        MACRO:   None        Signed by: Dior Roe 5/24/2024 9:05 AM   Dictation workstation:   [id number]        Medications:  Inpatient scheduled medications:  atorvastatin, 40 mg, oral, Nightly  ciprofloxacin, 250 mg, oral, q12h AGUS  [Held by provider] clopidogrel, 75 mg, oral, Daily  [Held by provider] furosemide, 20 mg, oral, Daily  insulin lispro, 0-5 Units, subcutaneous, TID  levothyroxine, 75 mcg, oral, Daily before breakfast  [Held by provider] lisinopril, 2.5 mg, oral, Daily  polyethylene glycol, 17 g, oral, Daily    Inpatient PRN medications:  PRN medications: dextrose, dextrose, glucagon, glucagon  Inpatient continuous medications:      Physical Exam:   GENERAL: Awake/alert, cooperative, conversant, NAD.  HEAD:  Normocephalic, atraumatic.  EYES:  Round and reactive. Anicteric.  ENT:  No nasal discharge, mucous membranes moist and pink.  NECK:  No JVD, cervical lymphadenopathy bilaterally.  CARDIOVASCULAR: HRRR, noncyanotic.  RESPIRATORY: CTAB without wheezes, rhonchi, rales.  On 0.5 LNC, no acute respiratory distress.  ABDOMEN:  Soft, no TTP, nondistended, hypoactive bowel sounds.  EXTREMITIES:  No peripheral edema, no calf tenderness. Peripheral pulses intact. Scattered bruises.   SKIN:  Warm and dry. No tenting. No rash or open wound noted.  NEUROLOGICAL:  Nonfocal grossly.  A&O x 4. CN II-XII grossly intact.    Assessment/Plan   Lupe Oshea is a 84 y.o. year old female with a PMHx of primary bladder CA, HTN, HLD, HFrEF (40 to 45% EF with multiple regional wall motion abnormalities and mild to moderate aortic valve regurgitation and recent echo) hypothyroidism, non-insulin-dependent T2DM, CKD stage III, CAD, recent NSTEMI (no stent placement, medically managed on DAPT), COPD on home O2 with chronic hypoxic respiratory failure, iron deficiency anemia, ambulatory dysfunction, former tobacco use, recent hospital admission  for mechanical fall, confusion, PNA who presents to [facility] for hematuria.  Her PCP is Dr. Spring.     #Hematuria, resolving  #UTI/asymptomatic bacteriuria, resolved  #Primary bladder CA  #CKD III, questionable LAUREN  #Acute on chronic anemia secondary to hematuria  -Has been seeing OP urology at [facility] - was found to have a bladder mass concerning for malignancy on renal US previously.  CTAP 3/18/2024 demonstrated lobular bladder mass consistent with a primary bladder CA, no extension through the bladder wall, no pelvic or extra-pelvic lymphadenopathy or evidence of metastatic disease  -Trend renal function labs and UOP, avoid nephrotoxins.  Urine electrolytes and urine creatinine ordered.  Renal US results as above.  Indeterminate FENa 1.1%.  UA positive for blood and leukocyte esterase, symptomatic for UTI however may be confounded in the setting of hematuria, treated for uncomplicated UTI with empiric coverage  -Holding Plavix for now  -Urology consulted, appreciate recs  Avoid lai catheterization/CBI unless unable to void  -Heme/onc following, no significant plans at this point, believes anemia is likely 2/2 persistent hematuria, chronic renal disease, AC use     Chronic:  CAD and recent NSTEMI without stenting: Holding Plavix, continue statin  Hypertension and CKD III: Holding home lisinopril  Hypothyroidism: Continue home levothyroxine  HFrEF: Holding home Lasix 20 mg daily  Non-insulin-dependent T2DM: Hold home metformin, SSI mild  Tobacco use disorder, former smoker  Iron deficiency anemia  Ambulatory dysfunction    Checklist:  Antimicrobials: -  Oxygen: 0.5 LNC  Feeding: Adult diet regular  Fluids: Defer  DVT ppx:  SCDs  Glycemic control: SSI mild  Bowel care: MiraLAX  LDAs: PIV  Code Status: DNR and No Intubation    This is a preliminary note written by the resident. Please wait for attending addendum for finalization of note and recommendations.

## 2024-05-28 NOTE — OP NOTE
CYSTOSCOPY/ TRANSURETHRAL RESECTION OF BLADDER TUMOR Operative Note     Date: 2024 - 2024  OR Location: [facility]    Name: Lupe Oshea, : 1940, Age: 84 y.o., MRN: [id number], Sex: female    Diagnosis  Pre-op Diagnosis     * Gross hematuria [R31.0] Post-op Diagnosis     * Gross hematuria [R31.0]     Procedures  CYSTOSCOPY/ TRANSURETHRAL RESECTION OF BLADDER TUMOR  02271 - CA CYSTO W/REMOVAL OF LESIONS SMALL    CYSTOSCOPY/ TRANSURETHRAL RESECTION OF BLADDER TUMOR  04056 - CA CYSTO W/REMOVAL OF LESIONS SMALL    CYSTOSCOPY/ TRANSURETHRAL RESECTION OF BLADDER TUMOR  07792 - CA CYSTO W/REMOVAL OF LESIONS SMALL      Surgeons      * Say George - Primary    Resident/Fellow/Other Assistant:  Surgeons and Role:  * No surgeons found with a matching role *    Procedure Summary  Anesthesia: General  ASA: ASA status not filed in the log.  Anesthesia Staff: Anesthesiologist: Juan Manuel Lassiter MD  CRNA: MARTINEZ Baptiste DNP  SRNA: Nusrat Cheema  Estimated Blood Loss: 100mL  Intra-op Medications: Administrations occurring from 1235 to 1320 on 24:  * No intraprocedure medications in log *           Anesthesia Record               Intraprocedure I/O Totals          Intake    LR infusion 700.00 mL    Total Intake 700 mL          Specimen:   ID Type Source Tests Collected by Time   1 : Bladder tumor Tissue BLADDER TRANSURETHRAL RESECTION SURGICAL PATHOLOGY EXAM Say George MD 2024 1340        Staff:   Relief Circulator: Mallika  Scrub Person: Jorge L  Circulator: Chen  Relief Scrub: Chen         Drains and/or Catheters:   Urethral Catheter Non-latex 24 Fr. (Active)       Tourniquet Times:         Implants:     Findings: Bladder tumor left lateral wall    Indications: Lupe Oshea is an 84 y.o. female who is having surgery for Gross hematuria [R31.0]. Secondary to a large bladder mass seen on imaging.    The patient was seen in the preoperative area. The risks,  benefits, complications, treatment options, non-operative alternatives, expected recovery and outcomes were discussed with the patient. The possibilities of reaction to medication, pulmonary aspiration, injury to surrounding structures, bleeding, recurrent infection, the need for additional procedures, failure to diagnose a condition, and creating a complication requiring transfusion or operation were discussed with the patient. The patient concurred with the proposed plan, giving informed consent.  The site of surgery was properly noted/marked if necessary per policy. The patient has been actively warmed in preoperative area. Preoperative antibiotics are not indicated. Venous thrombosis prophylaxis was achieved with sequential compression device.    Procedure Details: The patient underwent induction of general anesthesia.  Then, in the lithotomy position, the genitalia were prepped and draped appropriately.  A 22 fr cystoscope was inserted.   An exophytic tumor was seen arising from the left lateral wall.  A 26 fr resectoscope was placed.  TURBT was performed into muscularis.  The tumor was well removed from the left U.O. which remained intact.  Hemostasis was achieved with cautery.  All tissue fragments were irrigated from the bladder.  A 24 fr 3-way lai was placed.  Effuent returned clear.  Complications:  None; patient tolerated the procedure well.    Disposition: PACU - hemodynamically stable.  Condition: stable         Additional Details: none    Attending Attestation: I performed the procedure.    Say George  Phone Number: [phone]

## 2024-05-28 NOTE — PROGRESS NOTES
Patient ID: : Lupe Oshea            Primary Care Provider: Migel Spring DO     REFERRING PHYSICIAN:  Dr. Migel Spring     REASON FOR CONSULT:  Bladder carcinoma     HISTORY OF PRESENT ILLNESS:  Lupe Oshea is a 84 y.o. female with known history of most probable bladder carcinoma.  She is currently admitted to the hospital with worsening hematuria.  As per patient's son she has been diagnosed with most probable bladder carcinoma with plan for surgery for finding of large bladder mass.  Apparently procedure has been delayed due to recent cardiac issues and her being on Plavix and aspirin.     INTERVAL HISTORY:  Patient denies any new complaints other than acute on chronic issues with hematuria and generalized weakness. No history of nausea, vomiting, fever, night sweats, diarrhea, rash, anorexia or weight loss. No recent changes in medications.     PAST MEDICAL HISTORY:  1.  Bladder cancer as detailed above.  2.  Hypertension  3.  Hyperlipidemia  3.  CHF/coronary artery disease.  History of recent NSTEMI in 2024  5.  Moderate aortic regurgitation  6.  Hypothyroidism  7.  Type 2 diabetes mellitus  8.  CKD stage III  9.  COPD on home O2  10.  Iron deficiency anemia     SOCIAL HISTORY:  She lives with her son in [address] [place] [address].  Quit smoking in 2024.  1 pack a day for 60 years prior smoking history.  Nonalcoholic.  She is a retired .  Born and raised in [place]     FAMILY HISTORY:  Parents  in old 80s from cancer.  Exact details are not available 1 sister and 1 son alive and well.  No other family history of bleeding, clotting or malignant disorders in the family history.     REVIEW OF SYSTEMS:   Pertinent finding as per the history above.  All other systems have been reviewed and generally negative and noncontributory.     VITALS:  Vitals:    24 0946   BP: 110/51   Pulse: 66   Resp: 16   Temp: 36.2 °C (97.2 °F)   SpO2: 99%         PHYSICAL  EXAMINATION:  Detailed examination not done. Pt was in surgery          ASSESSMENT / PLAN:  1.  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  Patient is well-known to urology service from previous workup.  There are plans for her to have cystoscopy and further evaluation for treatment and management of bladder carcinoma.  Apparently plans have been delayed due to her recent cardiac issues.     She is being followed by urology during current hospitalization with plan for probable surgery/procedure in the next few weeks.  Depending upon the planned urology evaluation and workup we will plan oncology management accordingly.     2.  Anemia.  Most likely secondary to ongoing persistent hematuria.  Possibility of primary clonal disorder is less likely.  There is a possibility of component of chronic renal disease.  WBC and platelets etc. are all normal.  For now we will recommend continuing with the supportive care with transfusion support as needed.      Levi Funez DO

## 2024-05-28 NOTE — PROGRESS NOTES
Lupe Oshea is a 84 y.o. female on day 3 of admission presenting with Hematuria.    Subjective   No overnight events  She is npo afor cysto turbt this afternoon       Objective     Physical Exam    Last Recorded Vitals  Blood pressure 122/60, pulse 74, temperature 36.2 °C (97.2 °F), temperature source Temporal, resp. rate 16, height 1.524 m (5'), weight 54.4 kg (120 lb), SpO2 98%.  Intake/Output last 3 Shifts:  I/O last 3 completed shifts:  In: 1077 (19.8 mL/kg) [P.O.:1077]  Out: 2200 (40.4 mL/kg) [Urine:2200 (1.1 mL/kg/hr)]  Weight: 54.4 kg     Relevant Results  Results for orders placed or performed during the hospital encounter of 05/23/24 (from the past 24 hour(s))   POCT GLUCOSE   Result Value Ref Range    POCT Glucose 104 (H) 74 - 99 mg/dL   POCT GLUCOSE   Result Value Ref Range    POCT Glucose 159 (H) 74 - 99 mg/dL   POCT GLUCOSE   Result Value Ref Range    POCT Glucose 208 (H) 74 - 99 mg/dL   POCT GLUCOSE   Result Value Ref Range    POCT Glucose 97 74 - 99 mg/dL                                 Assessment/Plan   Principal Problem:    Hematuria  Active Problems:    Gross hematuria    Plan for cystoscopy and turbt this afternoon       I spent 10 minutes in the professional and overall care of this patient.      Aidan Hernandez PA-C

## 2024-05-28 NOTE — ANESTHESIA POSTPROCEDURE EVALUATION
Patient: Lupe Oshea    Procedure Summary       Date: 05/28/24 Room / Location: [facility] / [facility]    Anesthesia Start: 1319 Anesthesia Stop: 1422    Procedure: CYSTOSCOPY/ TRANSURETHRAL RESECTION OF BLADDER TUMOR (Bladder) Diagnosis:       Gross hematuria      (Gross hematuria [R31.0])    Surgeons: Say George MD Responsible Provider: Juan Manuel Lassiter MD    Anesthesia Type: general ASA Status: 3            Anesthesia Type: general    Vitals Value Taken Time   /65 05/28/24 1430   Temp 36 05/28/24 1445   Pulse 64 05/28/24 1443   Resp 16 05/28/24 1430   SpO2 95 % 05/28/24 1443   Vitals shown include unfiled device data.    Anesthesia Post Evaluation    Patient location during evaluation: PACU  Patient participation: complete - patient participated  Level of consciousness: awake and alert  Pain score: 0  Pain management: adequate  Airway patency: patent  Cardiovascular status: acceptable  Respiratory status: acceptable  Hydration status: acceptable  Postoperative Nausea and Vomiting: none      No notable events documented.

## 2024-05-29 VITALS
OXYGEN SATURATION: 98 % | HEART RATE: 71 BPM | WEIGHT: 120 LBS | DIASTOLIC BLOOD PRESSURE: 54 MMHG | HEIGHT: 60 IN | SYSTOLIC BLOOD PRESSURE: 122 MMHG | TEMPERATURE: 97.2 F | BODY MASS INDEX: 23.56 KG/M2 | RESPIRATION RATE: 16 BRPM

## 2024-05-29 LAB
ANION GAP SERPL CALC-SCNC: 10 MMOL/L (ref 10–20)
ATRIAL RATE: 58 BPM
BASOPHILS # BLD AUTO: 0.06 X10*3/UL (ref 0–0.1)
BASOPHILS NFR BLD AUTO: 0.6 %
BUN SERPL-MCNC: 35 MG/DL (ref 6–23)
CALCIUM SERPL-MCNC: 8.3 MG/DL (ref 8.6–10.3)
CHLORIDE SERPL-SCNC: 109 MMOL/L (ref 98–107)
CO2 SERPL-SCNC: 25 MMOL/L (ref 21–32)
CREAT SERPL-MCNC: 1.26 MG/DL (ref 0.5–1.05)
EGFRCR SERPLBLD CKD-EPI 2021: 42 ML/MIN/1.73M*2
EOSINOPHIL # BLD AUTO: 0.07 X10*3/UL (ref 0–0.4)
EOSINOPHIL NFR BLD AUTO: 0.8 %
ERYTHROCYTE [DISTWIDTH] IN BLOOD BY AUTOMATED COUNT: 15.1 % (ref 11.5–14.5)
GLUCOSE BLD MANUAL STRIP-MCNC: 106 MG/DL (ref 74–99)
GLUCOSE BLD MANUAL STRIP-MCNC: 129 MG/DL (ref 74–99)
GLUCOSE SERPL-MCNC: 102 MG/DL (ref 74–99)
HCT VFR BLD AUTO: 25.6 % (ref 36–46)
HGB BLD-MCNC: 7.9 G/DL (ref 12–16)
IMM GRANULOCYTES # BLD AUTO: 0.05 X10*3/UL (ref 0–0.5)
IMM GRANULOCYTES NFR BLD AUTO: 0.5 % (ref 0–0.9)
LYMPHOCYTES # BLD AUTO: 1 X10*3/UL (ref 0.8–3)
LYMPHOCYTES NFR BLD AUTO: 10.8 %
MAGNESIUM SERPL-MCNC: 1.99 MG/DL (ref 1.6–2.4)
MCH RBC QN AUTO: 30.2 PG (ref 26–34)
MCHC RBC AUTO-ENTMCNC: 30.9 G/DL (ref 32–36)
MCV RBC AUTO: 98 FL (ref 80–100)
MONOCYTES # BLD AUTO: 0.76 X10*3/UL (ref 0.05–0.8)
MONOCYTES NFR BLD AUTO: 8.2 %
NEUTROPHILS # BLD AUTO: 7.3 X10*3/UL (ref 1.6–5.5)
NEUTROPHILS NFR BLD AUTO: 79.1 %
NRBC BLD-RTO: 0 /100 WBCS (ref 0–0)
P AXIS: 70 DEGREES
P OFFSET: 210 MS
P ONSET: 150 MS
PLATELET # BLD AUTO: 245 X10*3/UL (ref 150–450)
POTASSIUM SERPL-SCNC: 4.2 MMOL/L (ref 3.5–5.3)
PR INTERVAL: 142 MS
Q ONSET: 221 MS
QRS COUNT: 10 BEATS
QRS DURATION: 76 MS
QT INTERVAL: 422 MS
QTC CALCULATION(BAZETT): 414 MS
QTC FREDERICIA: 417 MS
R AXIS: 84 DEGREES
RBC # BLD AUTO: 2.62 X10*6/UL (ref 4–5.2)
SODIUM SERPL-SCNC: 140 MMOL/L (ref 136–145)
T AXIS: 54 DEGREES
T OFFSET: 432 MS
VENTRICULAR RATE: 58 BPM
WBC # BLD AUTO: 9.2 X10*3/UL (ref 4.4–11.3)

## 2024-05-29 PROCEDURE — 2500000002 HC RX 250 W HCPCS SELF ADMINISTERED DRUGS (ALT 637 FOR MEDICARE OP, ALT 636 FOR OP/ED)

## 2024-05-29 PROCEDURE — 94760 N-INVAS EAR/PLS OXIMETRY 1: CPT

## 2024-05-29 PROCEDURE — 82947 ASSAY GLUCOSE BLOOD QUANT: CPT

## 2024-05-29 PROCEDURE — 99238 HOSP IP/OBS DSCHRG MGMT 30/<: CPT

## 2024-05-29 PROCEDURE — 83735 ASSAY OF MAGNESIUM: CPT

## 2024-05-29 PROCEDURE — 2500000001 HC RX 250 WO HCPCS SELF ADMINISTERED DRUGS (ALT 637 FOR MEDICARE OP)

## 2024-05-29 PROCEDURE — 36415 COLL VENOUS BLD VENIPUNCTURE: CPT

## 2024-05-29 PROCEDURE — 85025 COMPLETE CBC W/AUTO DIFF WBC: CPT

## 2024-05-29 PROCEDURE — 80048 BASIC METABOLIC PNL TOTAL CA: CPT

## 2024-05-29 RX ADMIN — LEVOTHYROXINE SODIUM 75 MCG: 0.07 TABLET ORAL at 06:01

## 2024-05-29 RX ADMIN — CIPROFLOXACIN HYDROCHLORIDE 250 MG: 250 TABLET, FILM COATED ORAL at 09:36

## 2024-05-29 ASSESSMENT — PAIN SCALES - GENERAL: PAINLEVEL_OUTOF10: 0 - NO PAIN

## 2024-05-29 NOTE — CARE PLAN
The patient's goals for the shift include decrease in hematuria    The clinical goals for the shift include Pt will have no blood in urine throughout shift    Over the shift, the patient did not make progress toward the following goals. Barriers to progression include acute illness. Recommendations to address these barriers include communication.

## 2024-05-29 NOTE — DISCHARGE INSTRUCTIONS
Thank you for choosing [facility] - it has been a pleasure taking part in your medical care. Please follow up with your Primary Medical Physician within 1 week of discharge and any specialists as noted within 1-2 weeks for further workup. If your symptoms should persist or worsen, please return immediately to the nearest Emergency Room for further care. If you have any questions about the care you received please call [facility] at [phone].      Follow-up appointments:  -Please follow-up with your PCP for continued management of your chronic medical conditions  -Please follow-up with urologist Dr. George this upcoming Friday for a void trial, Schmid catheter removal, and discussion about pathology results from the tumor biopsy  -Schedule an appointment to see hematologist/oncologist Dr. Suarez for follow-up on bladder cancer status    Go ahead and restart your Plavix tomorrow.  Continue to hold ASA until recommended to otherwise by your PCP or cardiologist

## 2024-05-29 NOTE — NURSING NOTE
1605: Discharge paperwork went over with pt and her son, all questions answered at this time. IV removed. Patients lai bag was switched to a leg bag. Patient to be discharged home with son.

## 2024-05-29 NOTE — DISCHARGE SUMMARY
Discharge diagnosis:  #Hematuria, resolving  #UTI/asymptomatic bacteriuria, resolved  #Primary bladder CA  #CKD III, questionable LAUREN  #Acute on chronic anemia secondary to hematuria    Hospital course:  Lupe Oshea is a 84 y.o. year old female with a PMHx of primary bladder CA, HTN, HLD, HFrEF (40 to 45% EF with multiple regional wall motion abnormalities and mild to moderate aortic valve regurgitation and recent echo) hypothyroidism, non-insulin-dependent T2DM, CKD stage III, CAD, recent NSTEMI (no stent placement, medically managed on DAPT), COPD on home O2 with chronic hypoxic respiratory failure, iron deficiency anemia, ambulatory dysfunction, former tobacco use, recent hospital admission for mechanical fall, confusion, PNA who presented to [facility] for hematuria.  Her PCP is Dr. Spring.  Patient reports the hematuria began night prior to admission, it was bright red with associated clot passage  Reports dysuria associated with the blood.  Son was at bedside to aid with history.  Patient was found to have a bladder mass concerning for malignancy on renal ultrasound previously.  Reports that they saw urology back in January with plans of cystoscopy and has been cleared by her cardiologist however nothing is scheduled. Denied other ROS complaints otherwise such as dysuria, suprapubic tenderness, flank pain, subjective fevers, chills, malaise, N/V, dark BMs, changes in stool pattern, other active bleeding, CP, respiratory distress unchanged from baseline, bilateral calf pain.  Patient is on 1 L nasal cannula oxygen as needed at home.  Patient was sent to the ED by her PCP.  In the ED, creatinine 1.77 (baseline 1.3-1.5), urinalysis shows RBC greater than 20, 3+ blood and urine, positive leukocyte esterase.  Empirically treated for uncomplicated UTI/asymptomatic bacteriuria with Rocephin and subsequently Cipro for total of 5 days.  Urology consulted, recommended for cardiac clearance for surgery-cystoscopy  and TURBT (obtained by her regular cardiologist Dr. Krueger), holding anticoagulation (Plavix) and washout, subsequent cystoscopy and TURBT.  Renal function was trended, FENa 1.1% indeterminate, questionable LAUREN since creatinine was as low as about 1 earlier this year.  Heme-onc was consulted per family request, established care and will manage oncological plan postdischarge.  Patient received 2 units of PRBC early in admission for recurrent anemia with Hgb <7.  The TURBT was performed successfully with minimal blood loss, good postop recovery, minimal pain thereafter.  The bladder mass was removed, biopsy is pending.  Urology placed a three-way Schmid for CBI, recommending for follow-up this upcoming Friday for void trial and hopeful Schmid removal, also recommended to restart AC the day after discharge.  Patient was follow-up in 1 to 2 weeks with Dr. George to discuss the pathology results.  Patient's gross hematuria resolved and she was discharged in stable condition to reinitiate her AC tomorrow and for close follow-up with urology, PCP, perhaps heme-onc depending on how the urology appointments go.    Inpatient scheduled medications on discharge:  atorvastatin, 40 mg, oral, Nightly  ciprofloxacin, 250 mg, oral, q12h AGUS  clopidogrel, 75 mg, oral, Daily  [Held by provider] furosemide, 20 mg, oral, Daily  insulin lispro, 0-5 Units, subcutaneous, TID  levothyroxine, 75 mcg, oral, Daily before breakfast  [Held by provider] lisinopril, 2.5 mg, oral, Daily  polyethylene glycol, 17 g, oral, Daily    Vitals:  /53 (BP Location: Left arm, Patient Position: Sitting)   Pulse 68   Temp 34.4 °C (93.9 °F) (Temporal)   Resp 18   Ht 1.524 m (5')   Wt 54.4 kg (120 lb)   SpO2 97%   BMI 23.44 kg/m²   Physical Exam:   GENERAL: Awake/alert, cooperative, conversant, NAD.  HEAD:  Normocephalic, atraumatic.  EYES:  Round and reactive. Anicteric.  ENT:  No nasal discharge, mucous membranes moist and pink.  NECK:  No JVD,  cervical lymphadenopathy bilaterally.  CARDIOVASCULAR: HRRR, noncyanotic.  RESPIRATORY: CTAB without wheezes, rhonchi, rales.  On 0.5 LNC, no acute respiratory distress.  ABDOMEN:  Soft, no TTP, nondistended, hypoactive bowel sounds.  EXTREMITIES:  No peripheral edema, no calf tenderness. Peripheral pulses intact. Scattered bruises.   SKIN:  Warm and dry. No tenting. No rash or open wound noted.  NEUROLOGICAL:  Nonfocal grossly.  A&O x 4. CN II-XII grossly intact.  : Schmid in place.    Outpatient follow-up instructions and medication changes:  Thank you for choosing [facility] - it has been a pleasure taking part in your medical care. Please follow up with your Primary Medical Physician within 1 week of discharge and any specialists as noted within 1-2 weeks for further workup. If your symptoms should persist or worsen, please return immediately to the nearest Emergency Room for further care. If you have any questions about the care you received please call [facility] [place] at [phone].      Follow-up appointments:  -Please follow-up with your PCP for continued management of your chronic medical conditions  -Please follow-up with urologist Dr. George this upcoming Friday for a void trial, Schmid catheter removal, and discussion about pathology results from the tumor biopsy  -Schedule an appointment to see hematologist/oncologist Dr. Suarez for follow-up on bladder cancer status    Go ahead and restart your Plavix tomorrow.  Continue to hold ASA until recommended to otherwise by your PCP or cardiologist      Prakash Marshall, DO   Internal Medicine PGY-1

## 2024-05-29 NOTE — CARE PLAN
Problem: Pain - Adult  Goal: Verbalizes/displays adequate comfort level or baseline comfort level  Outcome: Progressing     Problem: Safety - Adult  Goal: Free from fall injury  Outcome: Progressing     Problem: Discharge Planning  Goal: Discharge to home or other facility with appropriate resources  Outcome: Progressing     Problem: Chronic Conditions and Co-morbidities  Goal: Patient's chronic conditions and co-morbidity symptoms are monitored and maintained or improved  Outcome: Progressing     Problem: Diabetes  Goal: Achieve decreasing blood glucose levels by end of shift  Outcome: Progressing  Goal: Increase stability of blood glucose readings by end of shift  Outcome: Progressing  Goal: Decrease in ketones present in urine by end of shift  Outcome: Progressing  Goal: Maintain electrolyte levels within acceptable range throughout shift  Outcome: Progressing  Goal: Maintain glucose levels >70mg/dl to <250mg/dl throughout shift  Outcome: Progressing  Goal: No changes in neurological exam by end of shift  Outcome: Progressing  Goal: Learn about and adhere to nutrition recommendations by end of shift  Outcome: Progressing  Goal: Vital signs within normal range for age by end of shift  Outcome: Progressing  Goal: Increase self care and/or family involovement by end of shift  Outcome: Progressing  Goal: Receive DSME education by end of shift  Outcome: Progressing   The patient's goals for the shift include decrease in hematuria    The clinical goals for the shift include Pt will have no clots in urine throughout shift.    Over the shift, the patient did not make progress toward the following goals. Barriers to progression include patient is uncomfortable with the catheter. Recommendations to address these barriers include remind patient to use the call light when in need of assistance.

## 2024-05-29 NOTE — PROGRESS NOTES
Lupe Oshea is a 84 y.o. female on day 4 of admission presenting with Hematuria.    Subjective   POD #1 cysto TURBT  Tolerating lai  No pain    Objective     Physical Exam  No distress  Lai to CD with translucent colored urine with CBI running    Current Facility-Administered Medications:     atorvastatin (Lipitor) tablet 40 mg, 40 mg, oral, Nightly, Prakash Marshall DO, 40 mg at 05/28/24 2031    ciprofloxacin (Cipro) tablet 250 mg, 250 mg, oral, q12h AGUS, Prakash Marshall DO, 250 mg at 05/29/24 0936    [Held by provider] clopidogrel (Plavix) tablet 75 mg, 75 mg, oral, Daily, Prakash Marshall DO    dextrose 50 % injection 12.5 g, 12.5 g, intravenous, q15 min PRN, Prakash Marshall DO    dextrose 50 % injection 25 g, 25 g, intravenous, q15 min PRN, Prakash Marshall DO    [Held by provider] furosemide (Lasix) tablet 20 mg, 20 mg, oral, Daily, Prakash Marshall DO    glucagon (Glucagen) injection 1 mg, 1 mg, intramuscular, q15 min PRN, Prakash Marshall DO    glucagon (Glucagen) injection 1 mg, 1 mg, intramuscular, q15 min PRN, Prakash Marshall DO    insulin lispro (HumaLOG) injection 0-5 Units, 0-5 Units, subcutaneous, TID, Prakash Marshall DO, 1 Units at 05/27/24 1752    levothyroxine (Synthroid, Levoxyl) tablet 75 mcg, 75 mcg, oral, Daily before breakfast, Prakash Marshall DO, 75 mcg at 05/29/24 0601    [Held by provider] lisinopril tablet 2.5 mg, 2.5 mg, oral, Daily, Prakash Marshall DO    polyethylene glycol (Glycolax, Miralax) packet 17 g, 17 g, oral, Daily, Prakash Marshall DO      Last Recorded Vitals  Blood pressure 116/53, pulse 68, temperature 34.4 °C (93.9 °F), temperature source Temporal, resp. rate 18, height 1.524 m (5'), weight 54.4 kg (120 lb), SpO2 97%.  Intake/Output last 3 Shifts:  I/O last 3 completed shifts:  In: 909.3 (16.7 mL/kg) [P.O.:136; I.V.:773.3 (14.2 mL/kg)]  Out: 1250 (23 mL/kg) [Urine:1250 (0.6 mL/kg/hr)]  Weight: 54.4 kg     Relevant Results  Results for orders placed or  performed during the hospital encounter of 05/23/24 (from the past 96 hour(s))   POCT GLUCOSE   Result Value Ref Range    POCT Glucose 112 (H) 74 - 99 mg/dL   Coagulation Screen   Result Value Ref Range    Protime 12.3 9.8 - 12.8 seconds    INR 1.1 0.9 - 1.1    aPTT 32 27 - 38 seconds   CBC   Result Value Ref Range    WBC 7.9 4.4 - 11.3 x10*3/uL    nRBC 0.0 0.0 - 0.0 /100 WBCs    RBC 2.99 (L) 4.00 - 5.20 x10*6/uL    Hemoglobin 8.9 (L) 12.0 - 16.0 g/dL    Hematocrit 28.4 (L) 36.0 - 46.0 %    MCV 95 80 - 100 fL    MCH 29.8 26.0 - 34.0 pg    MCHC 31.3 (L) 32.0 - 36.0 g/dL    RDW 16.4 (H) 11.5 - 14.5 %    Platelets 289 150 - 450 x10*3/uL   POCT GLUCOSE   Result Value Ref Range    POCT Glucose 145 (H) 74 - 99 mg/dL   POCT GLUCOSE   Result Value Ref Range    POCT Glucose 207 (H) 74 - 99 mg/dL   POCT GLUCOSE   Result Value Ref Range    POCT Glucose 89 74 - 99 mg/dL   CBC and Auto Differential   Result Value Ref Range    WBC 6.5 4.4 - 11.3 x10*3/uL    nRBC 0.0 0.0 - 0.0 /100 WBCs    RBC 2.70 (L) 4.00 - 5.20 x10*6/uL    Hemoglobin 7.9 (L) 12.0 - 16.0 g/dL    Hematocrit 26.0 (L) 36.0 - 46.0 %    MCV 96 80 - 100 fL    MCH 29.3 26.0 - 34.0 pg    MCHC 30.4 (L) 32.0 - 36.0 g/dL    RDW 16.0 (H) 11.5 - 14.5 %    Platelets 278 150 - 450 x10*3/uL    Neutrophils % 61.6 40.0 - 80.0 %    Immature Granulocytes %, Automated 0.6 0.0 - 0.9 %    Lymphocytes % 19.6 13.0 - 44.0 %    Monocytes % 10.3 2.0 - 10.0 %    Eosinophils % 6.8 0.0 - 6.0 %    Basophils % 1.1 0.0 - 2.0 %    Neutrophils Absolute 4.00 1.60 - 5.50 x10*3/uL    Immature Granulocytes Absolute, Automated 0.04 0.00 - 0.50 x10*3/uL    Lymphocytes Absolute 1.27 0.80 - 3.00 x10*3/uL    Monocytes Absolute 0.67 0.05 - 0.80 x10*3/uL    Eosinophils Absolute 0.44 (H) 0.00 - 0.40 x10*3/uL    Basophils Absolute 0.07 0.00 - 0.10 x10*3/uL   Basic Metabolic Panel   Result Value Ref Range    Glucose 85 74 - 99 mg/dL    Sodium 141 136 - 145 mmol/L    Potassium 4.6 3.5 - 5.3 mmol/L    Chloride  108 (H) 98 - 107 mmol/L    Bicarbonate 26 21 - 32 mmol/L    Anion Gap 12 10 - 20 mmol/L    Urea Nitrogen 43 (H) 6 - 23 mg/dL    Creatinine 1.27 (H) 0.50 - 1.05 mg/dL    eGFR 42 (L) >60 mL/min/1.73m*2    Calcium 8.7 8.6 - 10.3 mg/dL   POCT GLUCOSE   Result Value Ref Range    POCT Glucose 96 74 - 99 mg/dL   POCT GLUCOSE   Result Value Ref Range    POCT Glucose 86 74 - 99 mg/dL   POCT GLUCOSE   Result Value Ref Range    POCT Glucose 212 (H) 74 - 99 mg/dL   POCT GLUCOSE   Result Value Ref Range    POCT Glucose 84 74 - 99 mg/dL   Magnesium   Result Value Ref Range    Magnesium 1.98 1.60 - 2.40 mg/dL   CBC   Result Value Ref Range    WBC 8.2 4.4 - 11.3 x10*3/uL    nRBC 0.0 0.0 - 0.0 /100 WBCs    RBC 2.73 (L) 4.00 - 5.20 x10*6/uL    Hemoglobin 8.1 (L) 12.0 - 16.0 g/dL    Hematocrit 26.3 (L) 36.0 - 46.0 %    MCV 96 80 - 100 fL    MCH 29.7 26.0 - 34.0 pg    MCHC 30.8 (L) 32.0 - 36.0 g/dL    RDW 15.6 (H) 11.5 - 14.5 %    Platelets 267 150 - 450 x10*3/uL   Basic Metabolic Panel   Result Value Ref Range    Glucose 91 74 - 99 mg/dL    Sodium 139 136 - 145 mmol/L    Potassium 4.7 3.5 - 5.3 mmol/L    Chloride 109 (H) 98 - 107 mmol/L    Bicarbonate 25 21 - 32 mmol/L    Anion Gap 10 10 - 20 mmol/L    Urea Nitrogen 38 (H) 6 - 23 mg/dL    Creatinine 1.26 (H) 0.50 - 1.05 mg/dL    eGFR 42 (L) >60 mL/min/1.73m*2    Calcium 8.6 8.6 - 10.3 mg/dL   POCT GLUCOSE   Result Value Ref Range    POCT Glucose 104 (H) 74 - 99 mg/dL   POCT GLUCOSE   Result Value Ref Range    POCT Glucose 159 (H) 74 - 99 mg/dL   POCT GLUCOSE   Result Value Ref Range    POCT Glucose 208 (H) 74 - 99 mg/dL   POCT GLUCOSE   Result Value Ref Range    POCT Glucose 97 74 - 99 mg/dL   CBC and Auto Differential   Result Value Ref Range    WBC 8.3 4.4 - 11.3 x10*3/uL    nRBC 0.0 0.0 - 0.0 /100 WBCs    RBC 2.65 (L) 4.00 - 5.20 x10*6/uL    Hemoglobin 8.0 (L) 12.0 - 16.0 g/dL    Hematocrit 25.9 (L) 36.0 - 46.0 %    MCV 98 80 - 100 fL    MCH 30.2 26.0 - 34.0 pg    MCHC 30.9 (L)  32.0 - 36.0 g/dL    RDW 15.3 (H) 11.5 - 14.5 %    Platelets 241 150 - 450 x10*3/uL    Neutrophils % 70.5 40.0 - 80.0 %    Immature Granulocytes %, Automated 0.4 0.0 - 0.9 %    Lymphocytes % 12.6 13.0 - 44.0 %    Monocytes % 10.1 2.0 - 10.0 %    Eosinophils % 5.7 0.0 - 6.0 %    Basophils % 0.7 0.0 - 2.0 %    Neutrophils Absolute 5.82 (H) 1.60 - 5.50 x10*3/uL    Immature Granulocytes Absolute, Automated 0.03 0.00 - 0.50 x10*3/uL    Lymphocytes Absolute 1.04 0.80 - 3.00 x10*3/uL    Monocytes Absolute 0.83 (H) 0.05 - 0.80 x10*3/uL    Eosinophils Absolute 0.47 (H) 0.00 - 0.40 x10*3/uL    Basophils Absolute 0.06 0.00 - 0.10 x10*3/uL   Basic Metabolic Panel   Result Value Ref Range    Glucose 95 74 - 99 mg/dL    Sodium 144 136 - 145 mmol/L    Potassium 4.8 3.5 - 5.3 mmol/L    Chloride 112 (H) 98 - 107 mmol/L    Bicarbonate 26 21 - 32 mmol/L    Anion Gap 11 10 - 20 mmol/L    Urea Nitrogen 44 (H) 6 - 23 mg/dL    Creatinine 1.53 (H) 0.50 - 1.05 mg/dL    eGFR 33 (L) >60 mL/min/1.73m*2    Calcium 8.7 8.6 - 10.3 mg/dL   Magnesium   Result Value Ref Range    Magnesium 2.03 1.60 - 2.40 mg/dL   POCT GLUCOSE   Result Value Ref Range    POCT Glucose 84 74 - 99 mg/dL   POCT GLUCOSE   Result Value Ref Range    POCT Glucose 144 (H) 74 - 99 mg/dL   POCT GLUCOSE   Result Value Ref Range    POCT Glucose 249 (H) 74 - 99 mg/dL   POCT GLUCOSE   Result Value Ref Range    POCT Glucose 106 (H) 74 - 99 mg/dL   CBC and Auto Differential   Result Value Ref Range    WBC 9.2 4.4 - 11.3 x10*3/uL    nRBC 0.0 0.0 - 0.0 /100 WBCs    RBC 2.62 (L) 4.00 - 5.20 x10*6/uL    Hemoglobin 7.9 (L) 12.0 - 16.0 g/dL    Hematocrit 25.6 (L) 36.0 - 46.0 %    MCV 98 80 - 100 fL    MCH 30.2 26.0 - 34.0 pg    MCHC 30.9 (L) 32.0 - 36.0 g/dL    RDW 15.1 (H) 11.5 - 14.5 %    Platelets 245 150 - 450 x10*3/uL    Neutrophils % 79.1 40.0 - 80.0 %    Immature Granulocytes %, Automated 0.5 0.0 - 0.9 %    Lymphocytes % 10.8 13.0 - 44.0 %    Monocytes % 8.2 2.0 - 10.0 %     Eosinophils % 0.8 0.0 - 6.0 %    Basophils % 0.6 0.0 - 2.0 %    Neutrophils Absolute 7.30 (H) 1.60 - 5.50 x10*3/uL    Immature Granulocytes Absolute, Automated 0.05 0.00 - 0.50 x10*3/uL    Lymphocytes Absolute 1.00 0.80 - 3.00 x10*3/uL    Monocytes Absolute 0.76 0.05 - 0.80 x10*3/uL    Eosinophils Absolute 0.07 0.00 - 0.40 x10*3/uL    Basophils Absolute 0.06 0.00 - 0.10 x10*3/uL   Basic Metabolic Panel   Result Value Ref Range    Glucose 102 (H) 74 - 99 mg/dL    Sodium 140 136 - 145 mmol/L    Potassium 4.2 3.5 - 5.3 mmol/L    Chloride 109 (H) 98 - 107 mmol/L    Bicarbonate 25 21 - 32 mmol/L    Anion Gap 10 10 - 20 mmol/L    Urea Nitrogen 35 (H) 6 - 23 mg/dL    Creatinine 1.26 (H) 0.50 - 1.05 mg/dL    eGFR 42 (L) >60 mL/min/1.73m*2    Calcium 8.3 (L) 8.6 - 10.3 mg/dL   Magnesium   Result Value Ref Range    Magnesium 1.99 1.60 - 2.40 mg/dL       US renal complete    Result Date: 5/24/2024  Interpreted By:  Dior Roe, STUDY: US RENAL COMPLETE;  5/24/2024 8:53 am   INDICATION: Signs/Symptoms:Active hematuria, known bladder CA.   COMPARISON: 01/18/2024   ACCESSION NUMBER(S): [id number]   ORDERING CLINICIAN: HODAN MCKENZIE   TECHNIQUE: Multiple images of the kidneys were obtained.   FINDINGS: RIGHT KIDNEY: The right kidney is  normal in size measuring  9.1 cm in length.   The echogenicity of the cortex of the right kidney is  within normal limits. There is a 3.0 cm simple parapelvic cyst in the lower pole. There is a 2.4 cm simple cyst in the cortex in the interpolar region of the right kidney. There is a 1.8 cm simple exophytic upper pole right renal cyst. 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 There is no intrarenal calculus or hydronephrosis.   LEFT KIDNEY: The left kidney is  normal in size measuring  9.1 cm in length.   The echogenicity of the cortex of the left kidney is  within normal  limits. There is no renal mass. There is no intrarenal calculus or hydronephrosis.   BLADDER: There is a large polypoid mass in the bladder consistent with the patient's known bladder carcinoma. This measures 6.4 x 5.5 x 3.8 cm. There is bladder wall thickening and trabeculation. There is debris in the bladder.       1. Simple right renal cysts. 2. 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 3. Large polypoid mass in the bladder consistent with the patient's known history of bladder carcinoma.   MACRO: None   Signed by: Dior Roe 5/24/2024 9:05 AM Dictation workstation:   [id number]       Assessment/Plan   Gross hematuria/Bladder mass  -Resolved   -Home with Schmid, void trial this Friday at our office  -OK to start anticoag tomorrow  -F/U in 1-2 weeks with Dr George to discuss pathology   -Patient's son updated as well    Patient OK for discharge from a  standpoint    Sukhjinder Gauthier PA-C

## 2024-05-30 ENCOUNTER — PATIENT OUTREACH (OUTPATIENT)
Dept: CARE COORDINATION | Facility: CLINIC | Age: 84
End: 2024-05-30
Payer: MEDICARE

## 2024-05-30 ENCOUNTER — TELEPHONE (OUTPATIENT)
Dept: CARDIOLOGY | Facility: CLINIC | Age: 84
End: 2024-05-30
Payer: MEDICARE

## 2024-05-30 DIAGNOSIS — I50.9 CONGESTIVE HEART FAILURE, UNSPECIFIED HF CHRONICITY, UNSPECIFIED HEART FAILURE TYPE (MULTI): ICD-10-CM

## 2024-05-30 NOTE — TELEPHONE ENCOUNTER
"Pt's son called stating pt recently discharged from hospital after cysto turbt. Pt's son states pt was restarted on Plavix but hospital wasn't sure if patient should remain off aspirin? Aspirin discontinued by Dr. Krueger on 5/22/24. Secure chat sent to Dr. Krueger. Per Dr. Krueger: \"Lets continue plavix for now. Reassess need for aspirin as outpatient. Follow up as scheduled in August, CBC before then.\" Spoke to patient's son and made aware.  "

## 2024-05-30 NOTE — PROGRESS NOTES
Discharge Facility: [facility]  Discharge Diagnosis: hematuria  Admission Date: 5/23/24  Discharge Date: 5/29/24    PCP Appointment Date: Prefers to keep August appt for now, will call us back tomorrow once talking with Dr George to see if sooner appt needed  Specialist Appointment Date:  5/31 Dr George, DONNA Suarez  Hospital Encounter and Summary: Linked   See discharge assessment below for further details    Engagement  Call Start Time: 1115 (5/30/2024 11:25 AM)    Medications  Medications reviewed with patient/caregiver?: Yes (5/30/2024 11:25 AM)  Is the patient having any side effects they believe may be caused by any medication additions or changes?: No (5/30/2024 11:25 AM)  Does the patient have all medications ordered at discharge?: Yes (5/30/2024 11:25 AM)  Care Management Interventions: No intervention needed (5/30/2024 11:25 AM)  Prescription Comments: Resumed plavix for now, no other medication changes (5/30/2024 11:25 AM)  Is the patient taking all medications as directed (includes completed medication regime)?: Yes (5/30/2024 11:25 AM)  Care Management Interventions: Provided patient education (5/30/2024 11:25 AM)  Medication Comments: No issues with medications. (5/30/2024 11:25 AM)    Appointments  Does the patient have a primary care provider?: Yes (5/30/2024 11:25 AM)  Care Management Interventions: Verified appointment date/time/provider (Prefers to talk with urologist tomorrow to see if sooner appt with Dr Spring is needed) (5/30/2024 11:25 AM)  Has the patient kept scheduled appointments due by today?: Yes (5/30/2024 11:25 AM)  Care Management Interventions: Advised to schedule with specialist (Mj will call Dr Suarez's office to schedule follow up) (5/30/2024 11:25 AM)    Self Management  What is the home health agency?: N/A (5/30/2024 11:25 AM)  What Durable Medical Equipment (DME) was ordered?: Schmid catheter (5/30/2024 11:25 AM)  Has all Durable Medical Equipment (DME) been  delivered?: Yes (5/30/2024 11:25 AM)    Patient Teaching  Does the patient have access to their discharge instructions?: Yes (5/30/2024 11:25 AM)  Care Management Interventions: Reviewed instructions with patient (5/30/2024 11:25 AM)  What is the patient's perception of their health status since discharge?: Improving (5/30/2024 11:25 AM)  Is the patient/caregiver able to teach back the hierarchy of who to call/visit for symptoms/problems? PCP, Specialist, Home Health nurse, Urgent Care, ED, 911: Yes (5/30/2024 11:25 AM)  Patient/Caregiver Education Comments: Aware to call urology with any return of hematuria or any issues with catheter. Maintain cleansing of catheter site today and ensure it is draining properly and tubing is not kinked. (5/30/2024 11:25 AM)    Wrap Up  Wrap Up Additional Comments: Spoke with son Mj and he is very pleased on how the surgery and recover has been going. Will see Dr George tomorrow for lai removal and void trial and will see if he believes sooner appt with Dr Spring is needed, prefers to keep august appt as is for now. (5/30/2024 11:25 AM)  Call End Time: 1125 (5/30/2024 11:25 AM)

## 2024-06-04 LAB
LABORATORY COMMENT REPORT: NORMAL
PATH REPORT.FINAL DX SPEC: NORMAL
PATH REPORT.GROSS SPEC: NORMAL
PATH REPORT.RELEVANT HX SPEC: NORMAL
PATH REPORT.TOTAL CANCER: NORMAL

## 2024-06-12 ENCOUNTER — LAB (OUTPATIENT)
Dept: LAB | Facility: LAB | Age: 84
End: 2024-06-12
Payer: MEDICARE

## 2024-06-12 DIAGNOSIS — N18.30 CHRONIC KIDNEY DISEASE, STAGE 3 UNSPECIFIED (MULTI): Primary | ICD-10-CM

## 2024-06-12 LAB
ALBUMIN SERPL BCP-MCNC: 3.8 G/DL (ref 3.4–5)
ANION GAP SERPL CALC-SCNC: 14 MMOL/L (ref 10–20)
BUN SERPL-MCNC: 36 MG/DL (ref 6–23)
C3 SERPL-MCNC: 109 MG/DL (ref 87–200)
C4 SERPL-MCNC: 45 MG/DL (ref 10–50)
CALCIUM SERPL-MCNC: 8.8 MG/DL (ref 8.6–10.3)
CHLORIDE SERPL-SCNC: 102 MMOL/L (ref 98–107)
CO2 SERPL-SCNC: 28 MMOL/L (ref 21–32)
CREAT SERPL-MCNC: 1.39 MG/DL (ref 0.5–1.05)
EGFRCR SERPLBLD CKD-EPI 2021: 37 ML/MIN/1.73M*2
GLUCOSE SERPL-MCNC: 81 MG/DL (ref 74–99)
PHOSPHATE SERPL-MCNC: 3.7 MG/DL (ref 2.5–4.9)
POTASSIUM SERPL-SCNC: 4.5 MMOL/L (ref 3.5–5.3)
PROT SERPL-MCNC: 6.1 G/DL (ref 6.4–8.2)
SODIUM SERPL-SCNC: 139 MMOL/L (ref 136–145)

## 2024-06-12 PROCEDURE — 84155 ASSAY OF PROTEIN SERUM: CPT

## 2024-06-12 PROCEDURE — 84165 PROTEIN E-PHORESIS SERUM: CPT

## 2024-06-12 PROCEDURE — 80069 RENAL FUNCTION PANEL: CPT

## 2024-06-12 PROCEDURE — 86160 COMPLEMENT ANTIGEN: CPT

## 2024-06-12 PROCEDURE — 36415 COLL VENOUS BLD VENIPUNCTURE: CPT

## 2024-06-12 PROCEDURE — 86334 IMMUNOFIX E-PHORESIS SERUM: CPT

## 2024-06-12 PROCEDURE — 86038 ANTINUCLEAR ANTIBODIES: CPT

## 2024-06-13 ENCOUNTER — PATIENT OUTREACH (OUTPATIENT)
Dept: CARE COORDINATION | Facility: CLINIC | Age: 84
End: 2024-06-13
Payer: MEDICARE

## 2024-06-13 NOTE — PROGRESS NOTES
Call regarding two week check in post hospital stay, prefers to keep pcp appt in August. Will see Dr George next week.  At time of outreach call the patient's son feels as if their condition has improved since last visit.  Has more energy and doing household chores but also aware of her limitations.

## 2024-06-14 LAB — ANA SER QL HEP2 SUBST: NEGATIVE

## 2024-06-17 ENCOUNTER — LAB (OUTPATIENT)
Dept: LAB | Facility: LAB | Age: 84
End: 2024-06-17
Payer: MEDICARE

## 2024-06-17 DIAGNOSIS — N18.30 CHRONIC KIDNEY DISEASE, STAGE 3 UNSPECIFIED (MULTI): ICD-10-CM

## 2024-06-17 LAB
ALBUMIN: 3.6 G/DL (ref 3.4–5)
ALPHA 1 GLOBULIN: 0.3 G/DL (ref 0.2–0.6)
ALPHA 2 GLOBULIN: 0.7 G/DL (ref 0.4–1.1)
BETA GLOBULIN: 0.7 G/DL (ref 0.5–1.2)
CREAT UR-MCNC: 42.7 MG/DL (ref 20–320)
GAMMA GLOBULIN: 0.7 G/DL (ref 0.5–1.4)
IMMUNOFIXATION COMMENT: NORMAL
MICROALBUMIN UR-MCNC: 27.5 MG/L
MICROALBUMIN/CREAT UR: 64.4 UG/MG CREAT
PATH REVIEW - SERUM IMMUNOFIXATION: NORMAL
PATH REVIEW-SERUM PROTEIN ELECTROPHORESIS: NORMAL
PROT UR-ACNC: 13 MG/DL (ref 5–25)
PROTEIN ELECTROPHORESIS COMMENT: NORMAL

## 2024-06-17 PROCEDURE — 82570 ASSAY OF URINE CREATININE: CPT

## 2024-06-17 PROCEDURE — 84166 PROTEIN E-PHORESIS/URINE/CSF: CPT

## 2024-06-17 PROCEDURE — 82043 UR ALBUMIN QUANTITATIVE: CPT

## 2024-06-17 PROCEDURE — 86335 IMMUNFIX E-PHORSIS/URINE/CSF: CPT

## 2024-06-17 PROCEDURE — 84156 ASSAY OF PROTEIN URINE: CPT

## 2024-06-20 LAB
ALBUMIN MFR UR ELPH: 46.6 %
ALPHA1 GLOB MFR UR ELPH: 5.2 %
ALPHA2 GLOB MFR UR ELPH: 12.2 %
B-GLOBULIN MFR UR ELPH: 21.2 %
GAMMA GLOB MFR UR ELPH: 14.8 %
IMMUNOFIXATION COMMENT: NORMAL
PATH REVIEW - URINE IMMUNOFIXATION: NORMAL
PATH REVIEW-URINE PROTEIN ELECTROPHORESIS: NORMAL
URINE ELECTROPHORESIS COMMENT: NORMAL

## 2024-06-27 DIAGNOSIS — J44.1 ACUTE EXACERBATION OF CHRONIC OBSTRUCTIVE PULMONARY DISEASE (MULTI): ICD-10-CM

## 2024-06-27 DIAGNOSIS — E11.9 TYPE 2 DIABETES MELLITUS WITHOUT COMPLICATION, WITHOUT LONG-TERM CURRENT USE OF INSULIN (MULTI): ICD-10-CM

## 2024-06-27 DIAGNOSIS — I50.9 CONGESTIVE HEART FAILURE, UNSPECIFIED HF CHRONICITY, UNSPECIFIED HEART FAILURE TYPE (MULTI): Primary | ICD-10-CM

## 2024-08-16 ENCOUNTER — APPOINTMENT (OUTPATIENT)
Dept: PRIMARY CARE | Facility: CLINIC | Age: 84
End: 2024-08-16
Payer: MEDICARE

## 2024-08-16 VITALS — DIASTOLIC BLOOD PRESSURE: 40 MMHG | SYSTOLIC BLOOD PRESSURE: 112 MMHG | BODY MASS INDEX: 22.26 KG/M2 | WEIGHT: 114 LBS

## 2024-08-16 DIAGNOSIS — E03.9 HYPOTHYROIDISM, UNSPECIFIED TYPE: ICD-10-CM

## 2024-08-16 DIAGNOSIS — E11.9 TYPE 2 DIABETES MELLITUS WITHOUT COMPLICATION, WITHOUT LONG-TERM CURRENT USE OF INSULIN (MULTI): ICD-10-CM

## 2024-08-16 DIAGNOSIS — R53.83 OTHER FATIGUE: ICD-10-CM

## 2024-08-16 DIAGNOSIS — I10 BENIGN ESSENTIAL HYPERTENSION: Primary | ICD-10-CM

## 2024-08-16 DIAGNOSIS — I50.9 CONGESTIVE HEART FAILURE, UNSPECIFIED HF CHRONICITY, UNSPECIFIED HEART FAILURE TYPE (MULTI): ICD-10-CM

## 2024-08-16 PROCEDURE — 1160F RVW MEDS BY RX/DR IN RCRD: CPT | Performed by: STUDENT IN AN ORGANIZED HEALTH CARE EDUCATION/TRAINING PROGRAM

## 2024-08-16 PROCEDURE — 3074F SYST BP LT 130 MM HG: CPT | Performed by: STUDENT IN AN ORGANIZED HEALTH CARE EDUCATION/TRAINING PROGRAM

## 2024-08-16 PROCEDURE — 1036F TOBACCO NON-USER: CPT | Performed by: STUDENT IN AN ORGANIZED HEALTH CARE EDUCATION/TRAINING PROGRAM

## 2024-08-16 PROCEDURE — G2211 COMPLEX E/M VISIT ADD ON: HCPCS | Performed by: STUDENT IN AN ORGANIZED HEALTH CARE EDUCATION/TRAINING PROGRAM

## 2024-08-16 PROCEDURE — 1159F MED LIST DOCD IN RCRD: CPT | Performed by: STUDENT IN AN ORGANIZED HEALTH CARE EDUCATION/TRAINING PROGRAM

## 2024-08-16 PROCEDURE — 3078F DIAST BP <80 MM HG: CPT | Performed by: STUDENT IN AN ORGANIZED HEALTH CARE EDUCATION/TRAINING PROGRAM

## 2024-08-16 PROCEDURE — 99213 OFFICE O/P EST LOW 20 MIN: CPT | Performed by: STUDENT IN AN ORGANIZED HEALTH CARE EDUCATION/TRAINING PROGRAM

## 2024-08-16 PROCEDURE — 1158F ADVNC CARE PLAN TLK DOCD: CPT | Performed by: STUDENT IN AN ORGANIZED HEALTH CARE EDUCATION/TRAINING PROGRAM

## 2024-08-16 PROCEDURE — 1123F ACP DISCUSS/DSCN MKR DOCD: CPT | Performed by: STUDENT IN AN ORGANIZED HEALTH CARE EDUCATION/TRAINING PROGRAM

## 2024-08-16 ASSESSMENT — ENCOUNTER SYMPTOMS
PSYCHIATRIC NEGATIVE: 1
NEUROLOGICAL NEGATIVE: 1
MUSCULOSKELETAL NEGATIVE: 1
GASTROINTESTINAL NEGATIVE: 1
CONSTITUTIONAL NEGATIVE: 1
CARDIOVASCULAR NEGATIVE: 1
RESPIRATORY NEGATIVE: 1

## 2024-08-16 NOTE — PROGRESS NOTES
Follow up    Subjective   Patient ID: Lupe Oshea is a 84 y.o. female.    Patient seen on follow-up.  She regards that she is overall doing well, and tolerating her medications.  Son does regard that she does have lack of appetite as of late and breathing also declined slightly a bit.  States that she does desaturate every now and then however wheezing and symptoms are overall stable.  Otherwise, does not want to do home health care or physical therapy.  Denies any additional issues or concerns.        Review of Systems   Constitutional: Negative.    HENT: Negative.     Respiratory: Negative.     Cardiovascular: Negative.    Gastrointestinal: Negative.    Musculoskeletal: Negative.    Neurological: Negative.    Psychiatric/Behavioral: Negative.         Objective Visit Vitals  BP (!) 112/40   Wt 51.7 kg (114 lb)   BMI 22.26 kg/m²   Smoking Status Former   BSA 1.48 m²      Physical Exam  Constitutional:       General: She is not in acute distress.     Appearance: She is not ill-appearing.   Eyes:      Pupils: Pupils are equal, round, and reactive to light.   Cardiovascular:      Rate and Rhythm: Normal rate and regular rhythm.      Pulses: Normal pulses.      Heart sounds: No murmur heard.  Pulmonary:      Effort: No respiratory distress.      Breath sounds: No wheezing.   Abdominal:      General: Abdomen is flat. Bowel sounds are normal. There is no distension.   Musculoskeletal:      Right lower leg: No edema.      Left lower leg: No edema.   Skin:     General: Skin is warm and dry.   Neurological:      Mental Status: She is alert. Mental status is at baseline.      Cranial Nerves: No cranial nerve deficit.      Motor: Weakness present.   Psychiatric:         Mood and Affect: Mood normal.         Behavior: Behavior normal.         Assessment/Plan   There are no diagnoses linked to this encounter.      #COPD  #Chronic hypoxic respiratory failure  #Status post NSTEMI  #CAD  #Diabetes  #Hypertension  #  Hematuria  #Anemia  #CKD  #Bladder mass  #Ambulatory dysfunction  #Former tobacco use       Overall medically stable  Discussed home health care referral however defers  Continue on current medications  Discussed protein supplements as she is starting to lose weight, they will try boost or Ensure discussed Remeron however deferred at this time  Continue follow-up with specialist    Return to care in 3-5

## 2024-08-19 ENCOUNTER — APPOINTMENT (OUTPATIENT)
Dept: CARDIOLOGY | Facility: CLINIC | Age: 84
End: 2024-08-19
Payer: MEDICARE

## 2024-08-19 VITALS
BODY MASS INDEX: 22.42 KG/M2 | HEART RATE: 78 BPM | SYSTOLIC BLOOD PRESSURE: 128 MMHG | HEIGHT: 60 IN | OXYGEN SATURATION: 92 % | DIASTOLIC BLOOD PRESSURE: 50 MMHG | WEIGHT: 114.2 LBS

## 2024-08-19 DIAGNOSIS — I50.9 CONGESTIVE HEART FAILURE, UNSPECIFIED HF CHRONICITY, UNSPECIFIED HEART FAILURE TYPE (MULTI): ICD-10-CM

## 2024-08-19 DIAGNOSIS — E78.00 HYPERCHOLESTEROLEMIA: ICD-10-CM

## 2024-08-19 DIAGNOSIS — I10 BENIGN ESSENTIAL HYPERTENSION: ICD-10-CM

## 2024-08-19 DIAGNOSIS — I25.82 CHRONIC TOTAL OCCLUSION OF CORONARY ARTERY: Primary | ICD-10-CM

## 2024-08-19 PROCEDURE — 99214 OFFICE O/P EST MOD 30 MIN: CPT | Performed by: STUDENT IN AN ORGANIZED HEALTH CARE EDUCATION/TRAINING PROGRAM

## 2024-08-19 PROCEDURE — 1159F MED LIST DOCD IN RCRD: CPT | Performed by: STUDENT IN AN ORGANIZED HEALTH CARE EDUCATION/TRAINING PROGRAM

## 2024-08-19 PROCEDURE — 1123F ACP DISCUSS/DSCN MKR DOCD: CPT | Performed by: STUDENT IN AN ORGANIZED HEALTH CARE EDUCATION/TRAINING PROGRAM

## 2024-08-19 PROCEDURE — 3078F DIAST BP <80 MM HG: CPT | Performed by: STUDENT IN AN ORGANIZED HEALTH CARE EDUCATION/TRAINING PROGRAM

## 2024-08-19 PROCEDURE — 3074F SYST BP LT 130 MM HG: CPT | Performed by: STUDENT IN AN ORGANIZED HEALTH CARE EDUCATION/TRAINING PROGRAM

## 2024-08-19 NOTE — PROGRESS NOTES
Chief Complaint:   Hypertension, Heart Failure, and Hx of DVT (6 month follow up)     History Of Present Illness:    Lupe Oshea is a 84 y.o. female returns to clinic for follow-up appointment.  Patient was hospitalized back in May of this year for an episode of hematuria secondary to polyp within the bladder.  She was taken off of aspirin she had a polyp removed which she has not had bleeding since that time.  He is on oxygen which she uses 1 to 2 L at home.  No chest pain or shortness of breath.  No lower extreme edema orthopnea or PND.  She is here to low salt diet.  Blood pressure is 128/50 today with a heart rate 78 saturation is 92% on 3 L.     Last Recorded Vitals:  Vitals:    08/19/24 1531   BP: 128/50   BP Location: Left arm   Patient Position: Sitting   BP Cuff Size: Adult   Pulse: 78   SpO2: 92%   Weight: 51.8 kg (114 lb 3.2 oz)   Height: 1.524 m (5')       Review of Systems  ROS      Allergies:  Patient has no known allergies.    Outpatient Medications:  Current Outpatient Medications   Medication Instructions    atorvastatin (LIPITOR) 40 mg, oral, Nightly    clopidogrel (PLAVIX) 75 mg, oral, Daily    furosemide (LASIX) 20 mg, oral, Daily    ipratropium (ATROVENT) 0.5 mg, nebulization, 2 times daily    ipratropium (ATROVENT) 500 mcg, nebulization, 4 times daily PRN    levothyroxine (SYNTHROID, LEVOXYL) 75 mcg, oral, Daily    lisinopril 2.5 mg, oral, Daily    metFORMIN (Glucophage) 500 mg tablet 1 tablet, oral, Daily    oxygen (O2) gas therapy 1 each, inhalation, Daily       Physical Exam:  General: No acute distress,  A&O x3, frail, sinus rhythm  Skin: Warm and dry  Neck: JVD is not elevated  ENT: Moist mucous membranes no lesions appreciated  Pulmonary: Diminished bilaterally  Cards: Regular rate rhythm, no murmurs gallops or rubs appreciated normal S1-S2  Abdomen: Soft nontender nondistended  Extremities: Trace edema in the lower extremities  Psych: Appropriate mood and affect        Last  Labs:  CBC -  Lab Results   Component Value Date    WBC 9.2 05/29/2024    HGB 7.9 (L) 05/29/2024    HCT 25.6 (L) 05/29/2024    MCV 98 05/29/2024     05/29/2024       CMP -  Lab Results   Component Value Date    CALCIUM 8.8 06/12/2024    PHOS 3.7 06/12/2024    PROT 6.1 (L) 06/12/2024    PROT 6.8 05/23/2024    ALBUMIN 3.8 06/12/2024    AST 13 05/23/2024    ALT 8 05/23/2024    ALKPHOS 86 05/23/2024    BILITOT 0.3 05/23/2024       LIPID PANEL -   Lab Results   Component Value Date    CHOL 123 01/17/2024    TRIG 41 01/17/2024    HDL 47.6 01/17/2024    CHHDL 2.6 01/17/2024    LDLF 114 (H) 09/13/2023    VLDL 8 01/17/2024    NHDL 75 01/17/2024       RENAL FUNCTION PANEL -   Lab Results   Component Value Date    GLUCOSE 81 06/12/2024     06/12/2024    K 4.5 06/12/2024     06/12/2024    CO2 28 06/12/2024    ANIONGAP 14 06/12/2024    BUN 36 (H) 06/12/2024    CREATININE 1.39 (H) 06/12/2024    CALCIUM 8.8 06/12/2024    PHOS 3.7 06/12/2024    ALBUMIN 3.8 06/12/2024        Lab Results   Component Value Date     (H) 02/07/2024    HGBA1C 6.0 (A) 09/13/2023    HGBA1C 6.5 10/19/2020       Last Cardiology Tests:  ECG:  Encounter Date: 05/23/24   EKG 12 lead   Result Value    Ventricular Rate 58    Atrial Rate 58    ID Interval 142    QRS Duration 76    QT Interval 422    QTC Calculation(Bazett) 414    P Axis 70    R Axis 84    T Axis 54    QRS Count 10    Q Onset 221    P Onset 150    P Offset 210    T Offset 432    QTC Fredericia 417    Narrative    Sinus bradycardia  Otherwise normal ECG  When compared with ECG of 17-JAN-2024 16:07,  PREVIOUS ECG IS PRESENT  Confirmed by Faisal Krueger ([id number]) on 5/29/2024 4:08:00 PM        Echo:  No results found for this or any previous visit from the past 1095 days.       Ejection Fractions:  EF   Date/Time Value Ref Range Status   01/18/2024 10:15 AM 59       Assessment and Plan      1.  CAD/NSTEMI: Initial symptoms of fatigue and weakness with atypical chest pain  GERD that occurred 2 weeks prior to hospital admission.  Peak high-sensitivity troponin of 769.  Echo showed new wall motion abnormality in the LAD territory with elevated troponins.   Left heart cath revealed  mid LAD mild to moderate disease in the remaining coronary vessels.   Decision was made for medical management for now.  Patient is chest pain-free today.      Recent episode of hematuria secondary to polyp.  Aspirin was discontinued and patient had a polyp removed.  She remains on single antiplatelet therapy with clopidogrel.  Continue atorvastatin 40 mg daily.       2.  Heart failure reduced ejection fraction: EF of 40 to 45%: Presented with acute on chronic exacerbation, bilateral pleural effusions, transaminitis felt to be related to congestion and volume overload.  Modest improvement with IV Lasix.  Volume status is now improved.  She remains on Lasix 20 mg daily.  Continue lisinopril 2.5 mg daily for neurohormonal blockade.  SShe is currently not on beta-blockade due to low blood pressures and low resting heart rate.    3.  Hypertension: Well-controlled today with current medications written.    4.  Hyperlipidemia: Well-controlled with current therapy.    Chronic issues  Advanced COPD requiring 1 to 2 L of oxygen     (This note was generated with voice recognition software and may contain errors including spelling, grammar, syntax and missed recognition of what was dictated, of which may not have been fully corrected)        Faisal Krueger MD PhD

## 2024-08-21 DIAGNOSIS — E03.9 HYPOTHYROIDISM, UNSPECIFIED TYPE: ICD-10-CM

## 2024-08-22 RX ORDER — LEVOTHYROXINE SODIUM 75 UG/1
75 TABLET ORAL DAILY
Qty: 90 TABLET | Refills: 1 | Status: SHIPPED | OUTPATIENT
Start: 2024-08-22

## 2024-08-29 ENCOUNTER — TELEPHONE (OUTPATIENT)
Dept: PRIMARY CARE | Facility: CLINIC | Age: 84
End: 2024-08-29
Payer: MEDICARE

## 2024-08-29 DIAGNOSIS — E11.9 TYPE 2 DIABETES MELLITUS WITHOUT COMPLICATION, WITHOUT LONG-TERM CURRENT USE OF INSULIN (MULTI): Primary | ICD-10-CM

## 2024-08-29 RX ORDER — METFORMIN HYDROCHLORIDE 500 MG/1
500 TABLET ORAL DAILY
Qty: 90 TABLET | Refills: 1 | Status: SHIPPED | OUTPATIENT
Start: 2024-08-29 | End: 2025-02-25

## 2024-08-29 NOTE — TELEPHONE ENCOUNTER
1) what to take OTC for cough.  2) needs metformin 500mg every day, last rx'd by previous pcp to [facility]

## 2024-11-23 DIAGNOSIS — I21.4 NSTEMI (NON-ST ELEVATED MYOCARDIAL INFARCTION) (MULTI): ICD-10-CM

## 2024-11-26 RX ORDER — CLOPIDOGREL BISULFATE 75 MG/1
75 TABLET ORAL DAILY
Qty: 90 TABLET | Refills: 0 | Status: SHIPPED | OUTPATIENT
Start: 2024-11-26

## 2024-12-02 ENCOUNTER — LAB (OUTPATIENT)
Dept: LAB | Facility: LAB | Age: 84
End: 2024-12-02
Payer: MEDICARE

## 2024-12-02 DIAGNOSIS — N18.30 CHRONIC KIDNEY DISEASE, STAGE 3 UNSPECIFIED (MULTI): Primary | ICD-10-CM

## 2024-12-02 DIAGNOSIS — D63.1 ANEMIA IN CHRONIC KIDNEY DISEASE (CODE): ICD-10-CM

## 2024-12-02 LAB
ALBUMIN SERPL BCP-MCNC: 3.4 G/DL (ref 3.4–5)
ANION GAP SERPL CALC-SCNC: 11 MMOL/L (ref 10–20)
APPEARANCE UR: ABNORMAL
BACTERIA #/AREA URNS AUTO: ABNORMAL /HPF
BASOPHILS # BLD AUTO: 0.05 X10*3/UL (ref 0–0.1)
BASOPHILS NFR BLD AUTO: 0.6 %
BILIRUB UR STRIP.AUTO-MCNC: NEGATIVE MG/DL
BUN SERPL-MCNC: 25 MG/DL (ref 6–23)
CALCIUM SERPL-MCNC: 8.7 MG/DL (ref 8.6–10.3)
CHLORIDE SERPL-SCNC: 102 MMOL/L (ref 98–107)
CO2 SERPL-SCNC: 33 MMOL/L (ref 21–32)
COLOR UR: ABNORMAL
CREAT SERPL-MCNC: 1.44 MG/DL (ref 0.5–1.05)
EGFRCR SERPLBLD CKD-EPI 2021: 36 ML/MIN/1.73M*2
EOSINOPHIL # BLD AUTO: 0.2 X10*3/UL (ref 0–0.4)
EOSINOPHIL NFR BLD AUTO: 2.4 %
ERYTHROCYTE [DISTWIDTH] IN BLOOD BY AUTOMATED COUNT: 16.5 % (ref 11.5–14.5)
GLUCOSE SERPL-MCNC: 83 MG/DL (ref 74–99)
GLUCOSE UR STRIP.AUTO-MCNC: NORMAL MG/DL
HCT VFR BLD AUTO: 26.8 % (ref 36–46)
HGB BLD-MCNC: 8 G/DL (ref 12–16)
HYALINE CASTS #/AREA URNS AUTO: ABNORMAL /LPF
IMM GRANULOCYTES # BLD AUTO: 0.03 X10*3/UL (ref 0–0.5)
IMM GRANULOCYTES NFR BLD AUTO: 0.4 % (ref 0–0.9)
KETONES UR STRIP.AUTO-MCNC: NEGATIVE MG/DL
LEUKOCYTE ESTERASE UR QL STRIP.AUTO: ABNORMAL
LYMPHOCYTES # BLD AUTO: 0.95 X10*3/UL (ref 0.8–3)
LYMPHOCYTES NFR BLD AUTO: 11.6 %
MCH RBC QN AUTO: 28.9 PG (ref 26–34)
MCHC RBC AUTO-ENTMCNC: 29.9 G/DL (ref 32–36)
MCV RBC AUTO: 97 FL (ref 80–100)
MONOCYTES # BLD AUTO: 0.76 X10*3/UL (ref 0.05–0.8)
MONOCYTES NFR BLD AUTO: 9.3 %
MUCOUS THREADS #/AREA URNS AUTO: ABNORMAL /LPF
NEUTROPHILS # BLD AUTO: 6.18 X10*3/UL (ref 1.6–5.5)
NEUTROPHILS NFR BLD AUTO: 75.7 %
NITRITE UR QL STRIP.AUTO: NEGATIVE
NRBC BLD-RTO: 0 /100 WBCS (ref 0–0)
PH UR STRIP.AUTO: 5 [PH]
PHOSPHATE SERPL-MCNC: 3.6 MG/DL (ref 2.5–4.9)
PLATELET # BLD AUTO: 276 X10*3/UL (ref 150–450)
POTASSIUM SERPL-SCNC: 4.2 MMOL/L (ref 3.5–5.3)
PROT UR STRIP.AUTO-MCNC: NEGATIVE MG/DL
RBC # BLD AUTO: 2.77 X10*6/UL (ref 4–5.2)
RBC # UR STRIP.AUTO: ABNORMAL /UL
RBC #/AREA URNS AUTO: >20 /HPF
SODIUM SERPL-SCNC: 142 MMOL/L (ref 136–145)
SP GR UR STRIP.AUTO: 1.01
UROBILINOGEN UR STRIP.AUTO-MCNC: NORMAL MG/DL
WBC # BLD AUTO: 8.2 X10*3/UL (ref 4.4–11.3)
WBC #/AREA URNS AUTO: ABNORMAL /HPF

## 2024-12-02 PROCEDURE — 85025 COMPLETE CBC W/AUTO DIFF WBC: CPT

## 2024-12-02 PROCEDURE — 81001 URINALYSIS AUTO W/SCOPE: CPT

## 2024-12-02 PROCEDURE — 80069 RENAL FUNCTION PANEL: CPT

## 2024-12-02 PROCEDURE — 36415 COLL VENOUS BLD VENIPUNCTURE: CPT

## 2024-12-13 ENCOUNTER — APPOINTMENT (OUTPATIENT)
Dept: PRIMARY CARE | Facility: CLINIC | Age: 84
End: 2024-12-13
Payer: MEDICARE

## 2024-12-13 VITALS — BODY MASS INDEX: 22.07 KG/M2 | SYSTOLIC BLOOD PRESSURE: 98 MMHG | DIASTOLIC BLOOD PRESSURE: 40 MMHG | WEIGHT: 113 LBS

## 2024-12-13 DIAGNOSIS — I21.4 NSTEMI (NON-ST ELEVATED MYOCARDIAL INFARCTION) (MULTI): ICD-10-CM

## 2024-12-13 DIAGNOSIS — I50.9 CONGESTIVE HEART FAILURE, UNSPECIFIED HF CHRONICITY, UNSPECIFIED HEART FAILURE TYPE: ICD-10-CM

## 2024-12-13 DIAGNOSIS — M79.643 PAIN OF HAND, UNSPECIFIED LATERALITY: ICD-10-CM

## 2024-12-13 DIAGNOSIS — E03.9 HYPOTHYROIDISM, UNSPECIFIED TYPE: ICD-10-CM

## 2024-12-13 DIAGNOSIS — E11.9 TYPE 2 DIABETES MELLITUS WITHOUT COMPLICATION, WITHOUT LONG-TERM CURRENT USE OF INSULIN (MULTI): ICD-10-CM

## 2024-12-13 DIAGNOSIS — I95.9 HYPOTENSION, UNSPECIFIED HYPOTENSION TYPE: Primary | ICD-10-CM

## 2024-12-13 PROCEDURE — 1160F RVW MEDS BY RX/DR IN RCRD: CPT | Performed by: STUDENT IN AN ORGANIZED HEALTH CARE EDUCATION/TRAINING PROGRAM

## 2024-12-13 PROCEDURE — G2211 COMPLEX E/M VISIT ADD ON: HCPCS | Performed by: STUDENT IN AN ORGANIZED HEALTH CARE EDUCATION/TRAINING PROGRAM

## 2024-12-13 PROCEDURE — 1159F MED LIST DOCD IN RCRD: CPT | Performed by: STUDENT IN AN ORGANIZED HEALTH CARE EDUCATION/TRAINING PROGRAM

## 2024-12-13 PROCEDURE — 3078F DIAST BP <80 MM HG: CPT | Performed by: STUDENT IN AN ORGANIZED HEALTH CARE EDUCATION/TRAINING PROGRAM

## 2024-12-13 PROCEDURE — 1123F ACP DISCUSS/DSCN MKR DOCD: CPT | Performed by: STUDENT IN AN ORGANIZED HEALTH CARE EDUCATION/TRAINING PROGRAM

## 2024-12-13 PROCEDURE — 1036F TOBACCO NON-USER: CPT | Performed by: STUDENT IN AN ORGANIZED HEALTH CARE EDUCATION/TRAINING PROGRAM

## 2024-12-13 PROCEDURE — 99214 OFFICE O/P EST MOD 30 MIN: CPT | Performed by: STUDENT IN AN ORGANIZED HEALTH CARE EDUCATION/TRAINING PROGRAM

## 2024-12-13 PROCEDURE — 3074F SYST BP LT 130 MM HG: CPT | Performed by: STUDENT IN AN ORGANIZED HEALTH CARE EDUCATION/TRAINING PROGRAM

## 2024-12-13 RX ORDER — MIDODRINE HYDROCHLORIDE 5 MG/1
5 TABLET ORAL
Qty: 270 TABLET | Refills: 0 | Status: SHIPPED | OUTPATIENT
Start: 2024-12-13 | End: 2025-03-13

## 2024-12-13 RX ORDER — CLOPIDOGREL BISULFATE 75 MG/1
75 TABLET ORAL DAILY
Qty: 90 TABLET | Refills: 0 | Status: SHIPPED | OUTPATIENT
Start: 2024-12-13

## 2024-12-13 RX ORDER — LISINOPRIL 2.5 MG/1
2.5 TABLET ORAL DAILY
Qty: 90 TABLET | Refills: 3 | Status: SHIPPED | OUTPATIENT
Start: 2024-12-13 | End: 2025-12-13

## 2024-12-13 RX ORDER — FUROSEMIDE 20 MG/1
20 TABLET ORAL DAILY PRN
Qty: 90 TABLET | Refills: 1 | Status: SHIPPED | OUTPATIENT
Start: 2024-12-13 | End: 2025-06-11

## 2024-12-13 RX ORDER — LEVOTHYROXINE SODIUM 75 UG/1
75 TABLET ORAL DAILY
Qty: 90 TABLET | Refills: 1 | Status: SHIPPED | OUTPATIENT
Start: 2024-12-13

## 2024-12-13 RX ORDER — METFORMIN HYDROCHLORIDE 500 MG/1
500 TABLET ORAL DAILY
Qty: 90 TABLET | Refills: 1 | Status: SHIPPED | OUTPATIENT
Start: 2024-12-13 | End: 2025-06-11

## 2024-12-13 ASSESSMENT — ENCOUNTER SYMPTOMS
RESPIRATORY NEGATIVE: 1
GASTROINTESTINAL NEGATIVE: 1
CARDIOVASCULAR NEGATIVE: 1
PSYCHIATRIC NEGATIVE: 1
CONSTITUTIONAL NEGATIVE: 1
NEUROLOGICAL NEGATIVE: 1
MUSCULOSKELETAL NEGATIVE: 1

## 2024-12-13 ASSESSMENT — PATIENT HEALTH QUESTIONNAIRE - PHQ9
2. FEELING DOWN, DEPRESSED OR HOPELESS: NOT AT ALL
1. LITTLE INTEREST OR PLEASURE IN DOING THINGS: NOT AT ALL
SUM OF ALL RESPONSES TO PHQ9 QUESTIONS 1 AND 2: 0

## 2024-12-13 NOTE — PROGRESS NOTES
Subjective   Patient ID: Kim Oshea is a 84 y.o. female.    Patient seen in follow-up.  Regards that she is overall doing well.  Recently saw her nephrologist who noted that things were overall stable.  Otherwise, she is noticing that her blood pressure is slightly low.  Is asymptomatic from this.  Was told to take Lasix as needed however does not understand what that is.  Otherwise, states no additional issues.  Feels overall well.        Review of Systems   Constitutional: Negative.    HENT: Negative.     Respiratory: Negative.     Cardiovascular: Negative.    Gastrointestinal: Negative.    Musculoskeletal: Negative.    Neurological: Negative.    Psychiatric/Behavioral: Negative.         Objective Visit Vitals  BP (!) 98/40   Wt 51.3 kg (113 lb)   BMI 22.07 kg/m²   Smoking Status Former   BSA 1.47 m²   ;ll  Physical Exam  Constitutional:       General: She is not in acute distress.     Appearance: She is not ill-appearing.   Eyes:      Pupils: Pupils are equal, round, and reactive to light.   Cardiovascular:      Rate and Rhythm: Normal rate and regular rhythm.      Pulses: Normal pulses.      Heart sounds: No murmur heard.  Pulmonary:      Effort: No respiratory distress.      Breath sounds: No wheezing.   Abdominal:      General: Abdomen is flat. Bowel sounds are normal. There is no distension.   Musculoskeletal:      Right lower leg: No edema.      Left lower leg: No edema.   Skin:     General: Skin is warm and dry.   Neurological:      Mental Status: She is alert. Mental status is at baseline.      Cranial Nerves: No cranial nerve deficit.      Motor: No weakness.   Psychiatric:         Mood and Affect: Mood normal.         Behavior: Behavior normal.         Assessment/Plan   Diagnoses and all orders for this visit:  Hypotension, unspecified hypotension type  -     midodrine (Proamatine) 5 mg tablet; Take 1 tablet (5 mg) by mouth 3 times daily (morning, midday, late afternoon). DO not take if SBP >110,  or DBP >80  NSTEMI (non-ST elevated myocardial infarction) (Multi)  -     clopidogrel (Plavix) 75 mg tablet; Take 1 tablet (75 mg) by mouth once daily.  Congestive heart failure, unspecified HF chronicity, unspecified heart failure type  -     furosemide (Lasix) 20 mg tablet; Take 1 tablet (20 mg) by mouth once daily as needed (leg swelling).  -     lisinopril 2.5 mg tablet; Take 1 tablet (2.5 mg) by mouth once daily.  Hypothyroidism, unspecified type  -     levothyroxine (Synthroid, Levoxyl) 75 mcg tablet; Take 1 tablet (75 mcg) by mouth once daily.  Type 2 diabetes mellitus without complication, without long-term current use of insulin (Multi)  -     metFORMIN (Glucophage) 500 mg tablet; Take 1 tablet (500 mg) by mouth once daily.  Pain of hand, unspecified laterality  -     Referral to Occupational Therapy; Future      #COPD  #Chronic hypoxic respiratory failure  #Status post NSTEMI  #CAD  #Diabetes  #Hypertension  # Hematuria  #Anemia  #CKD  #Bladder mass  #Ambulatory dysfunction  #Former tobacco use        Overall medically stable  Doing much better with regards to activities of daily living, however is having some hand pain, Occupational Therapy referral  Continue on current medications reviewed nephrology notes  Discussed protein supplements as she is starting to lose weight, they will try boost or Ensure discussed Remeron however deferred at this time  Continue follow-up with specialist discussed as needed dosing of Lasix  Discussed initiation of midodrine due to hypotension, she is overall asymptomatic from this but will continue to monitor midodrine 5 mg 3 times daily     Return to care in 3-5

## 2024-12-20 ENCOUNTER — TELEPHONE (OUTPATIENT)
Dept: PRIMARY CARE | Facility: CLINIC | Age: 84
End: 2024-12-20
Payer: MEDICARE

## 2024-12-20 DIAGNOSIS — E86.1 HYPOTENSION DUE TO HYPOVOLEMIA: Primary | ICD-10-CM

## 2024-12-20 DIAGNOSIS — I95.89 HYPOTENSION DUE TO HYPOVOLEMIA: Primary | ICD-10-CM

## 2024-12-20 RX ORDER — FLUDROCORTISONE ACETATE 0.1 MG/1
0.1 TABLET ORAL DAILY
Qty: 90 TABLET | Refills: 1 | Status: SHIPPED | OUTPATIENT
Start: 2024-12-20 | End: 2025-06-18

## 2024-12-20 NOTE — TELEPHONE ENCOUNTER
Rx Midodrine 5mg called in on 12/13/24. Cost is $213.00.  is there something else she can take that is less money.

## 2025-02-03 PROBLEM — R31.0 GROSS HEMATURIA: Status: RESOLVED | Noted: 2024-05-25 | Resolved: 2025-02-03

## 2025-02-24 ENCOUNTER — OFFICE VISIT (OUTPATIENT)
Dept: CARDIOLOGY | Facility: CLINIC | Age: 85
End: 2025-02-24
Payer: MEDICARE

## 2025-02-24 VITALS
WEIGHT: 113.6 LBS | SYSTOLIC BLOOD PRESSURE: 110 MMHG | BODY MASS INDEX: 22.19 KG/M2 | DIASTOLIC BLOOD PRESSURE: 42 MMHG | HEART RATE: 81 BPM | OXYGEN SATURATION: 93 %

## 2025-02-24 DIAGNOSIS — I10 BENIGN ESSENTIAL HYPERTENSION: ICD-10-CM

## 2025-02-24 DIAGNOSIS — E78.00 HYPERCHOLESTEROLEMIA: ICD-10-CM

## 2025-02-24 DIAGNOSIS — I50.22 CHRONIC SYSTOLIC HEART FAILURE: ICD-10-CM

## 2025-02-24 DIAGNOSIS — I50.9 CONGESTIVE HEART FAILURE, UNSPECIFIED HF CHRONICITY, UNSPECIFIED HEART FAILURE TYPE: ICD-10-CM

## 2025-02-24 DIAGNOSIS — I21.4 NSTEMI (NON-ST ELEVATED MYOCARDIAL INFARCTION) (MULTI): ICD-10-CM

## 2025-02-24 DIAGNOSIS — I25.82 CHRONIC TOTAL OCCLUSION OF CORONARY ARTERY: Primary | ICD-10-CM

## 2025-02-24 PROCEDURE — 3074F SYST BP LT 130 MM HG: CPT | Performed by: STUDENT IN AN ORGANIZED HEALTH CARE EDUCATION/TRAINING PROGRAM

## 2025-02-24 PROCEDURE — 93010 ELECTROCARDIOGRAM REPORT: CPT | Performed by: STUDENT IN AN ORGANIZED HEALTH CARE EDUCATION/TRAINING PROGRAM

## 2025-02-24 PROCEDURE — 1159F MED LIST DOCD IN RCRD: CPT | Performed by: STUDENT IN AN ORGANIZED HEALTH CARE EDUCATION/TRAINING PROGRAM

## 2025-02-24 PROCEDURE — 3078F DIAST BP <80 MM HG: CPT | Performed by: STUDENT IN AN ORGANIZED HEALTH CARE EDUCATION/TRAINING PROGRAM

## 2025-02-24 PROCEDURE — 1123F ACP DISCUSS/DSCN MKR DOCD: CPT | Performed by: STUDENT IN AN ORGANIZED HEALTH CARE EDUCATION/TRAINING PROGRAM

## 2025-02-24 PROCEDURE — 93005 ELECTROCARDIOGRAM TRACING: CPT | Performed by: STUDENT IN AN ORGANIZED HEALTH CARE EDUCATION/TRAINING PROGRAM

## 2025-02-24 PROCEDURE — 99214 OFFICE O/P EST MOD 30 MIN: CPT | Performed by: STUDENT IN AN ORGANIZED HEALTH CARE EDUCATION/TRAINING PROGRAM

## 2025-02-24 PROCEDURE — 99214 OFFICE O/P EST MOD 30 MIN: CPT | Mod: 25 | Performed by: STUDENT IN AN ORGANIZED HEALTH CARE EDUCATION/TRAINING PROGRAM

## 2025-02-24 NOTE — PROGRESS NOTES
"Chief Complaint:   NSTEMI, Hypertension, Heart Failure, and Hx of DVT (6  month follow up)     History Of Present Illness:    Lupe Oshea \"Kim\" is a 85 y.o. female returns to clinic for follow-up.  The site of the patient's painhas been having lower blood pressures.  She is initially prescribed midodrine but was unable to afford therapy.  Started on fluids with Florinef but is yet to initiate therapy.  Blood pressure today is 110/42.  No chest pain.  She is on her baseline level of oxygen.       Last Recorded Vitals:  Vitals:    02/24/25 1602   BP: (!) 110/42   BP Location: Left arm   Patient Position: Sitting   BP Cuff Size: Adult   Pulse: 81   SpO2: 93%   Weight: 51.5 kg (113 lb 9.6 oz)       Review of Systems  ROS      Allergies:  Patient has no known allergies.    Outpatient Medications:  Current Outpatient Medications   Medication Instructions    atorvastatin (LIPITOR) 40 mg, oral, Nightly    clopidogrel (PLAVIX) 75 mg, oral, Daily    fludrocortisone (FLORINEF) 0.1 mg, oral, Daily    furosemide (LASIX) 20 mg, oral, Daily PRN    ipratropium (ATROVENT) 0.5 mg, nebulization, 2 times daily    ipratropium (ATROVENT) 500 mcg, nebulization, 4 times daily PRN    levothyroxine (SYNTHROID, LEVOXYL) 75 mcg, oral, Daily    lisinopril 2.5 mg, oral, Daily    metFORMIN (GLUCOPHAGE) 500 mg, oral, Daily    midodrine (PROAMATINE) 5 mg, oral, 3 times daily (morning, midday, late afternoon), DO not take if SBP >110, or DBP >80    oxygen (O2) gas therapy 1 each, inhalation, Daily       Physical Exam:  General: No acute distress,  A&O x3, sinus present, on supplemental oxygen  Skin: Warm and dry  Neck: JVD is not elevated  ENT: Moist mucous membranes no lesions appreciated  Pulmonary: Breath sounds are diminished bilaterally  Cards: Regular rate rhythm, no murmurs gallops or rubs appreciated normal S1-S2  Abdomen: Soft nontender nondistended  Extremities: No edema or cyanosis  Psych: Appropriate mood and affect        "   Last Labs:  CBC -  Lab Results   Component Value Date    WBC 8.2 12/02/2024    HGB 8.0 (L) 12/02/2024    HCT 26.8 (L) 12/02/2024    MCV 97 12/02/2024     12/02/2024       CMP -  Lab Results   Component Value Date    CALCIUM 8.7 12/02/2024    PHOS 3.6 12/02/2024    PROT 6.1 (L) 06/12/2024    PROT 6.8 05/23/2024    ALBUMIN 3.4 12/02/2024    AST 13 05/23/2024    ALT 8 05/23/2024    ALKPHOS 86 05/23/2024    BILITOT 0.3 05/23/2024       LIPID PANEL -   Lab Results   Component Value Date    CHOL 123 01/17/2024    TRIG 41 01/17/2024    HDL 47.6 01/17/2024    CHHDL 2.6 01/17/2024    LDLF 114 (H) 09/13/2023    VLDL 8 01/17/2024    NHDL 75 01/17/2024       RENAL FUNCTION PANEL -   Lab Results   Component Value Date    GLUCOSE 83 12/02/2024     12/02/2024    K 4.2 12/02/2024     12/02/2024    CO2 33 (H) 12/02/2024    ANIONGAP 11 12/02/2024    BUN 25 (H) 12/02/2024    CREATININE 1.44 (H) 12/02/2024    CALCIUM 8.7 12/02/2024    PHOS 3.6 12/02/2024    ALBUMIN 3.4 12/02/2024        Lab Results   Component Value Date     (H) 02/07/2024    HGBA1C 6.0 (A) 09/13/2023    HGBA1C 6.5 10/19/2020       Last Cardiology Tests:  ECG:  Encounter Date: 02/24/25   ECG 12 lead (Clinic Performed)   Result Value    Ventricular Rate 78    Atrial Rate 78    OH Interval 138    QRS Duration 66    QT Interval 372    QTC Calculation(Bazett) 424    P Axis 67    R Axis 112    T Axis 61    QRS Count 12    Q Onset 228    P Onset 159    P Offset 213    T Offset 414    QTC Fredericia 406    Narrative    Normal sinus rhythm  Possible Left atrial enlargement  Right axis deviation  Low voltage QRS  Septal infarct , age undetermined  Abnormal ECG  When compared with ECG of 28-MAY-2024 12:00,  Septal infarct is now Present        Echo:  No results found for this or any previous visit from the past 1095 days.       Ejection Fractions:  EF   Date/Time Value Ref Range Status   01/18/2024 10:15 AM 59       Assessment and Plan    1.   CAD/NSTEMI: Initial symptoms of fatigue and weakness with atypical chest pain GERD that occurred 2 weeks prior to hospital admission.  Peak high-sensitivity troponin of 769.  Echo showed new wall motion abnormality in the LAD territory with elevated troponins.   Left heart cath revealed  mid LAD mild to moderate disease in the remaining coronary vessels.   Decision was made for medical management for now.  Patient is chest pain-free today.       Prior episode of hematuria secondary to polyp.  Aspirin was discontinued and patient had a polyp removed.  She remains on single antiplatelet therapy with clopidogrel.  Continue atorvastatin 40 mg daily.       2.  Heart failure reduced ejection fraction: EF of 40 to 45%: Presented with acute on chronic exacerbation, bilateral pleural effusions, transaminitis felt to be related to congestion and volume overload.  Modest improvement with IV Lasix.  Volume status is now improved.  Continue Lasix 20 mg to take as needed.  Hold lisinopril due to low blood pressures.  Currently not on beta-blockade due to low blood pressures and low resting heart rate.     3.  Hypertension: Well-controlled today with current medications written.     4.  Hyperlipidemia: Well-controlled with current therapy.     Chronic issues  Advanced COPD requiring 1 to 2 L of oxygen     (This note was generated with voice recognition software and may contain errors including spelling, grammar, syntax and missed recognition of what was dictated, of which may not have been fully corrected)     Faisal Krueger MD PhD

## 2025-02-25 LAB
ATRIAL RATE: 78 BPM
P AXIS: 67 DEGREES
P OFFSET: 213 MS
P ONSET: 159 MS
PR INTERVAL: 138 MS
Q ONSET: 228 MS
QRS COUNT: 12 BEATS
QRS DURATION: 66 MS
QT INTERVAL: 372 MS
QTC CALCULATION(BAZETT): 424 MS
QTC FREDERICIA: 406 MS
R AXIS: 112 DEGREES
T AXIS: 61 DEGREES
T OFFSET: 414 MS
VENTRICULAR RATE: 78 BPM

## 2025-02-26 DIAGNOSIS — I21.4 NSTEMI (NON-ST ELEVATED MYOCARDIAL INFARCTION) (MULTI): ICD-10-CM

## 2025-02-27 RX ORDER — ATORVASTATIN CALCIUM 40 MG/1
40 TABLET, FILM COATED ORAL NIGHTLY
Qty: 90 TABLET | Refills: 3 | Status: SHIPPED | OUTPATIENT
Start: 2025-02-27 | End: 2026-02-27

## 2025-04-25 ENCOUNTER — APPOINTMENT (OUTPATIENT)
Dept: PRIMARY CARE | Facility: CLINIC | Age: 85
End: 2025-04-25
Payer: MEDICARE

## 2025-04-25 VITALS
HEIGHT: 60 IN | DIASTOLIC BLOOD PRESSURE: 56 MMHG | SYSTOLIC BLOOD PRESSURE: 134 MMHG | HEART RATE: 86 BPM | WEIGHT: 113 LBS | OXYGEN SATURATION: 91 % | BODY MASS INDEX: 22.19 KG/M2

## 2025-04-25 DIAGNOSIS — R73.9 HYPERGLYCEMIA: ICD-10-CM

## 2025-04-25 DIAGNOSIS — E78.5 HYPERLIPIDEMIA, UNSPECIFIED HYPERLIPIDEMIA TYPE: Primary | ICD-10-CM

## 2025-04-25 PROCEDURE — 1159F MED LIST DOCD IN RCRD: CPT | Performed by: STUDENT IN AN ORGANIZED HEALTH CARE EDUCATION/TRAINING PROGRAM

## 2025-04-25 PROCEDURE — 1123F ACP DISCUSS/DSCN MKR DOCD: CPT | Performed by: STUDENT IN AN ORGANIZED HEALTH CARE EDUCATION/TRAINING PROGRAM

## 2025-04-25 PROCEDURE — G2211 COMPLEX E/M VISIT ADD ON: HCPCS | Performed by: STUDENT IN AN ORGANIZED HEALTH CARE EDUCATION/TRAINING PROGRAM

## 2025-04-25 PROCEDURE — 1160F RVW MEDS BY RX/DR IN RCRD: CPT | Performed by: STUDENT IN AN ORGANIZED HEALTH CARE EDUCATION/TRAINING PROGRAM

## 2025-04-25 PROCEDURE — 3078F DIAST BP <80 MM HG: CPT | Performed by: STUDENT IN AN ORGANIZED HEALTH CARE EDUCATION/TRAINING PROGRAM

## 2025-04-25 PROCEDURE — 99214 OFFICE O/P EST MOD 30 MIN: CPT | Performed by: STUDENT IN AN ORGANIZED HEALTH CARE EDUCATION/TRAINING PROGRAM

## 2025-04-25 PROCEDURE — 3075F SYST BP GE 130 - 139MM HG: CPT | Performed by: STUDENT IN AN ORGANIZED HEALTH CARE EDUCATION/TRAINING PROGRAM

## 2025-04-25 PROCEDURE — 1036F TOBACCO NON-USER: CPT | Performed by: STUDENT IN AN ORGANIZED HEALTH CARE EDUCATION/TRAINING PROGRAM

## 2025-04-25 ASSESSMENT — ENCOUNTER SYMPTOMS
LOSS OF SENSATION IN FEET: 0
DEPRESSION: 0
MUSCULOSKELETAL NEGATIVE: 1
GASTROINTESTINAL NEGATIVE: 1
PSYCHIATRIC NEGATIVE: 1
NEUROLOGICAL NEGATIVE: 1
OCCASIONAL FEELINGS OF UNSTEADINESS: 0
CARDIOVASCULAR NEGATIVE: 1
CONSTITUTIONAL NEGATIVE: 1
RESPIRATORY NEGATIVE: 1

## 2025-04-25 ASSESSMENT — PATIENT HEALTH QUESTIONNAIRE - PHQ9
SUM OF ALL RESPONSES TO PHQ9 QUESTIONS 1 AND 2: 0
2. FEELING DOWN, DEPRESSED OR HOPELESS: NOT AT ALL
1. LITTLE INTEREST OR PLEASURE IN DOING THINGS: NOT AT ALL

## 2025-04-25 NOTE — PROGRESS NOTES
Subjective   Patient ID: Kim Oshea is a 85 y.o. female.    Patient seen in routine follow-up.  Regards that she is overall doing well without any issues or concerns.  States that her fluid status is overall stable.  Trying to cut down on salt as well as lifestyle modifications intermittently takes Lasix.  Blood pressure is overall stable.  Otherwise, regards no acute issues or concerns at this time and overall feels well.  O2 sats is overall stable as well.        Review of Systems   Constitutional: Negative.    HENT: Negative.     Respiratory: Negative.     Cardiovascular: Negative.    Gastrointestinal: Negative.    Musculoskeletal: Negative.    Neurological: Negative.    Psychiatric/Behavioral: Negative.         Objective Visit Vitals  /56   Pulse 86   Ht 1.524 m (5')   Wt 51.3 kg (113 lb)   SpO2 91%   BMI 22.07 kg/m²   Smoking Status Former   BSA 1.47 m²      Physical Exam  Constitutional:       General: She is not in acute distress.     Appearance: She is not ill-appearing.   Eyes:      Pupils: Pupils are equal, round, and reactive to light.   Cardiovascular:      Rate and Rhythm: Normal rate and regular rhythm.      Pulses: Normal pulses.      Heart sounds: No murmur heard.  Pulmonary:      Effort: No respiratory distress.      Breath sounds: No wheezing.   Abdominal:      General: Abdomen is flat. Bowel sounds are normal. There is no distension.   Musculoskeletal:      Right lower leg: No edema.      Left lower leg: No edema.   Skin:     General: Skin is warm and dry.   Neurological:      Mental Status: She is alert. Mental status is at baseline.      Cranial Nerves: No cranial nerve deficit.      Motor: No weakness.   Psychiatric:         Mood and Affect: Mood normal.         Behavior: Behavior normal.         Assessment/Plan   Diagnoses and all orders for this visit:  Hyperlipidemia, unspecified hyperlipidemia type  -     TSH with reflex to Free T4 if abnormal; Future  -     Lipid panel;  Future  -     Hemoglobin A1C; Future  Hyperglycemia  -     Hemoglobin A1C; Future      #COPD  #Chronic hypoxic respiratory failure  #Status post NSTEMI  #CAD  #Diabetes  #Hypertension  # Hematuria  #Anemia  #CKD  #Bladder mass  #Ambulatory dysfunction  #Former tobacco use      Overall medically stable    Blood pressure well-controlled  Continue as needed Lasix, discussed lifestyle modifications avoidance of salt  Likely will need follow-up with echocardiogram at next visit  Blood work at next visit she will get with her nephrologist  Appreciate recommendations  Continue on O2 as does still require it  Continue ADLs  No change in medications    Return to care in 6 months

## 2025-05-08 DIAGNOSIS — J44.1 CHRONIC OBSTRUCTIVE PULMONARY DISEASE WITH ACUTE EXACERBATION (MULTI): ICD-10-CM

## 2025-05-08 RX ORDER — IPRATROPIUM BROMIDE 0.5 MG/2.5ML
0.5 SOLUTION RESPIRATORY (INHALATION) 2 TIMES DAILY
Qty: 125 ML | Refills: 6 | Status: SHIPPED | OUTPATIENT
Start: 2025-05-08 | End: 2025-11-04

## 2025-05-19 DIAGNOSIS — I21.4 NSTEMI (NON-ST ELEVATED MYOCARDIAL INFARCTION) (MULTI): ICD-10-CM

## 2025-05-19 RX ORDER — CLOPIDOGREL BISULFATE 75 MG/1
75 TABLET ORAL DAILY
Qty: 90 TABLET | Refills: 1 | Status: SHIPPED | OUTPATIENT
Start: 2025-05-19

## 2025-05-21 ENCOUNTER — TELEPHONE (OUTPATIENT)
Dept: PRIMARY CARE | Facility: CLINIC | Age: 85
End: 2025-05-21
Payer: MEDICARE

## 2025-05-21 NOTE — TELEPHONE ENCOUNTER
1)Bloody nose past couple days, on blood thinner, what to do?  2)Went to audiologist and informed ears blocked with wax. What to do?

## 2025-05-29 LAB
CHOLEST SERPL-MCNC: 121 MG/DL
CHOLEST/HDLC SERPL: 2 (CALC)
EST. AVERAGE GLUCOSE BLD GHB EST-MCNC: 97 MG/DL
EST. AVERAGE GLUCOSE BLD GHB EST-SCNC: 5.4 MMOL/L
HBA1C MFR BLD: 5 %
HDLC SERPL-MCNC: 61 MG/DL
LDLC SERPL CALC-MCNC: 47 MG/DL (CALC)
NONHDLC SERPL-MCNC: 60 MG/DL (CALC)
TRIGL SERPL-MCNC: 51 MG/DL
TSH SERPL-ACNC: 2.58 MIU/L (ref 0.4–4.5)

## 2025-06-16 ENCOUNTER — APPOINTMENT (OUTPATIENT)
Dept: PRIMARY CARE | Facility: CLINIC | Age: 85
End: 2025-06-16
Payer: MEDICARE

## 2025-06-16 VITALS — DIASTOLIC BLOOD PRESSURE: 54 MMHG | SYSTOLIC BLOOD PRESSURE: 92 MMHG

## 2025-06-16 DIAGNOSIS — H61.23 BILATERAL IMPACTED CERUMEN: Primary | ICD-10-CM

## 2025-06-16 DIAGNOSIS — E11.22 TYPE 2 DIABETES MELLITUS WITH STAGE 3A CHRONIC KIDNEY DISEASE, WITHOUT LONG-TERM CURRENT USE OF INSULIN (MULTI): ICD-10-CM

## 2025-06-16 DIAGNOSIS — I50.23 ACUTE ON CHRONIC SYSTOLIC (CONGESTIVE) HEART FAILURE: ICD-10-CM

## 2025-06-16 DIAGNOSIS — C67.9 MALIGNANT NEOPLASM OF URINARY BLADDER, UNSPECIFIED SITE (MULTI): ICD-10-CM

## 2025-06-16 DIAGNOSIS — J44.9 CHRONIC OBSTRUCTIVE PULMONARY DISEASE, UNSPECIFIED COPD TYPE (MULTI): ICD-10-CM

## 2025-06-16 DIAGNOSIS — N18.31 TYPE 2 DIABETES MELLITUS WITH STAGE 3A CHRONIC KIDNEY DISEASE, WITHOUT LONG-TERM CURRENT USE OF INSULIN (MULTI): ICD-10-CM

## 2025-06-16 PROBLEM — J44.1 ACUTE EXACERBATION OF CHRONIC OBSTRUCTIVE PULMONARY DISEASE (MULTI): Status: RESOLVED | Noted: 2024-05-13 | Resolved: 2025-06-16

## 2025-06-16 PROBLEM — J96.00 ACUTE RESPIRATORY FAILURE: Status: RESOLVED | Noted: 2024-05-13 | Resolved: 2025-06-16

## 2025-06-16 PROCEDURE — 3074F SYST BP LT 130 MM HG: CPT | Performed by: STUDENT IN AN ORGANIZED HEALTH CARE EDUCATION/TRAINING PROGRAM

## 2025-06-16 PROCEDURE — 1159F MED LIST DOCD IN RCRD: CPT | Performed by: STUDENT IN AN ORGANIZED HEALTH CARE EDUCATION/TRAINING PROGRAM

## 2025-06-16 PROCEDURE — 1160F RVW MEDS BY RX/DR IN RCRD: CPT | Performed by: STUDENT IN AN ORGANIZED HEALTH CARE EDUCATION/TRAINING PROGRAM

## 2025-06-16 PROCEDURE — 3078F DIAST BP <80 MM HG: CPT | Performed by: STUDENT IN AN ORGANIZED HEALTH CARE EDUCATION/TRAINING PROGRAM

## 2025-06-16 PROCEDURE — 99212 OFFICE O/P EST SF 10 MIN: CPT | Performed by: STUDENT IN AN ORGANIZED HEALTH CARE EDUCATION/TRAINING PROGRAM

## 2025-06-16 PROCEDURE — 1036F TOBACCO NON-USER: CPT | Performed by: STUDENT IN AN ORGANIZED HEALTH CARE EDUCATION/TRAINING PROGRAM

## 2025-06-16 NOTE — PROGRESS NOTES
"Subjective   Patient ID: Lupe Oshea \"Kim\" is a 85 y.o. female who presents for Ear Fullness (Ears feels blocked x 1 month).  [facility]  Kim is here for follow up visit.    Was recently seen by Audiologist, told that her ears are blocked bilaterally with wax. Hearing is impaired.     She is considering bladder biopsy with Dr. George, would like to discuss with Dr. Krueger first.    Review of Systems  12-point ROS was reviewed and is negative, unless otherwise noted in HPI    Objective   Vitals:    06/16/25 1452   BP: 92/54      Physical Exam  GEN: alert, conversant, NAD  HEENT: PERRL, EOMI, MMM, bilateral Tms obstructed by oxidized cerumen bilaterally  NECK: supple, no LAD appreciated  CHEST: CTAB  CV: S1, S2, RRR, no murmurs appreciated  ABD: soft, NT, ND  EXT: no significant LE edema  SKIN: warm, dry    Assessment/Plan   #cerumen impaction, bilateral  - advised debrox drops x4-5 days bilaterall and return for MA appointment for ear irrigation    RTC as scheduled, sooner PRN       Jose Ramon Ryan,   "

## 2025-06-26 ENCOUNTER — TELEPHONE (OUTPATIENT)
Dept: PRIMARY CARE | Facility: CLINIC | Age: 85
End: 2025-06-26

## 2025-06-26 ENCOUNTER — APPOINTMENT (OUTPATIENT)
Dept: PRIMARY CARE | Facility: CLINIC | Age: 85
End: 2025-06-26
Payer: MEDICARE

## 2025-06-26 DIAGNOSIS — H91.90 HEARING LOSS, UNSPECIFIED HEARING LOSS TYPE, UNSPECIFIED LATERALITY: Primary | ICD-10-CM

## 2025-06-26 DIAGNOSIS — H61.23 BILATERAL IMPACTED CERUMEN: ICD-10-CM

## 2025-06-26 PROCEDURE — 69210 REMOVE IMPACTED EAR WAX UNI: CPT | Performed by: STUDENT IN AN ORGANIZED HEALTH CARE EDUCATION/TRAINING PROGRAM

## 2025-07-11 LAB
NON-UH HIE AMORPHOUS SEDIMENT, U: ABNORMAL
NON-UH HIE APPEARANCE, U: ABNORMAL
NON-UH HIE BACTERIA, U: ABNORMAL
NON-UH HIE BILIRUBIN, U: ABNORMAL
NON-UH HIE BLOOD, U: ABNORMAL
NON-UH HIE COLOR, U: ABNORMAL
NON-UH HIE GLUCOSE QUAL, U: ABNORMAL
NON-UH HIE HYALINE CAST: <1 #/HPF
NON-UH HIE KETONES, U: ABNORMAL
NON-UH HIE LEUKOCYTE ESTERASE, U: ABNORMAL
NON-UH HIE NITRITE, U: ABNORMAL
NON-UH HIE PH, U: 5 (ref 4.5–8)
NON-UH HIE PROTEIN, U: ABNORMAL
NON-UH HIE RBC/HPF, U: 2 #/HPF (ref 0–3)
NON-UH HIE SPECIFIC GRAVITY, U: 1.01 (ref 1–1.03)
NON-UH HIE SQUAMOUS EPITHELIAL CELLS, U: 3 #/HPF
NON-UH HIE U MICRO: ABNORMAL
NON-UH HIE UROBILINOGEN QUAL, U: ABNORMAL
NON-UH HIE WBC/HPF, U: 1 #/HPF (ref 0–5)

## 2025-07-17 ENCOUNTER — TELEPHONE (OUTPATIENT)
Dept: PRIMARY CARE | Facility: CLINIC | Age: 85
End: 2025-07-17
Payer: MEDICARE

## 2025-07-17 NOTE — TELEPHONE ENCOUNTER
Pt waiting to hear from you, left a message couple days ago, she was to have surgery and has questions.  Also, possible Uti, was confused yesterday, also,  Will need to go to Assisted living, son can no longer provide care she needs.  Please call son.

## 2025-07-25 ENCOUNTER — OFFICE VISIT (OUTPATIENT)
Dept: PRIMARY CARE | Facility: CLINIC | Age: 85
End: 2025-07-25
Payer: MEDICARE

## 2025-07-25 VITALS — DIASTOLIC BLOOD PRESSURE: 47 MMHG | WEIGHT: 101 LBS | BODY MASS INDEX: 19.73 KG/M2 | SYSTOLIC BLOOD PRESSURE: 102 MMHG

## 2025-07-25 DIAGNOSIS — I50.9 CONGESTIVE HEART FAILURE, UNSPECIFIED HF CHRONICITY, UNSPECIFIED HEART FAILURE TYPE: ICD-10-CM

## 2025-07-25 DIAGNOSIS — F03.90 DEMENTIA WITHOUT BEHAVIORAL DISTURBANCE (MULTI): Primary | ICD-10-CM

## 2025-07-25 PROCEDURE — 3074F SYST BP LT 130 MM HG: CPT | Performed by: STUDENT IN AN ORGANIZED HEALTH CARE EDUCATION/TRAINING PROGRAM

## 2025-07-25 PROCEDURE — G2211 COMPLEX E/M VISIT ADD ON: HCPCS | Performed by: STUDENT IN AN ORGANIZED HEALTH CARE EDUCATION/TRAINING PROGRAM

## 2025-07-25 PROCEDURE — 99215 OFFICE O/P EST HI 40 MIN: CPT | Performed by: STUDENT IN AN ORGANIZED HEALTH CARE EDUCATION/TRAINING PROGRAM

## 2025-07-25 PROCEDURE — 1158F ADVNC CARE PLAN TLK DOCD: CPT | Performed by: STUDENT IN AN ORGANIZED HEALTH CARE EDUCATION/TRAINING PROGRAM

## 2025-07-25 PROCEDURE — 3078F DIAST BP <80 MM HG: CPT | Performed by: STUDENT IN AN ORGANIZED HEALTH CARE EDUCATION/TRAINING PROGRAM

## 2025-07-25 PROCEDURE — 1160F RVW MEDS BY RX/DR IN RCRD: CPT | Performed by: STUDENT IN AN ORGANIZED HEALTH CARE EDUCATION/TRAINING PROGRAM

## 2025-07-25 PROCEDURE — 1159F MED LIST DOCD IN RCRD: CPT | Performed by: STUDENT IN AN ORGANIZED HEALTH CARE EDUCATION/TRAINING PROGRAM

## 2025-07-25 RX ORDER — FUROSEMIDE 40 MG/1
40 TABLET ORAL DAILY PRN
Qty: 90 TABLET | Refills: 1 | Status: SHIPPED | OUTPATIENT
Start: 2025-07-25 | End: 2026-01-21

## 2025-07-25 ASSESSMENT — ENCOUNTER SYMPTOMS
RESPIRATORY NEGATIVE: 1
PSYCHIATRIC NEGATIVE: 1
GASTROINTESTINAL NEGATIVE: 1
CONSTITUTIONAL NEGATIVE: 1
NEUROLOGICAL NEGATIVE: 1
MUSCULOSKELETAL NEGATIVE: 1
CARDIOVASCULAR NEGATIVE: 1

## 2025-07-25 NOTE — PROGRESS NOTES
Subjective   Patient ID: Kim Oshea is a 85 y.o. female.    Patient seen on routine evaluation.  Son is present, noticed that patient has been declining of the life.  Has been having worsening issues with memory loss, and activity of daily living have been very difficult to deal with.  States that she is unsafe to live at home, and that she likely needs further care at memory care facility.  There is concern for an underlying dementia.  Otherwise, is wondering about further care planning moving forward to him treatment wise, as patient has deconditioning and weight loss.  Otherwise no additional issues.        Review of Systems   Reason unable to perform ROS: difficult to obtain due to dementia.   Constitutional: Negative.    HENT: Negative.     Respiratory: Negative.     Cardiovascular: Negative.    Gastrointestinal: Negative.    Musculoskeletal: Negative.    Neurological: Negative.    Psychiatric/Behavioral: Negative.         Objective Visit Vitals  BP (!) 102/47   Wt 45.8 kg (101 lb)   BMI 19.73 kg/m²   Smoking Status Former   BSA 1.39 m²      Physical Exam  Constitutional:       General: She is not in acute distress.     Appearance: She is not ill-appearing.     Eyes:      Pupils: Pupils are equal, round, and reactive to light.       Cardiovascular:      Rate and Rhythm: Normal rate and regular rhythm.      Pulses: Normal pulses.      Heart sounds: No murmur heard.  Pulmonary:      Effort: No respiratory distress.      Breath sounds: No wheezing.   Abdominal:      General: Abdomen is flat. Bowel sounds are normal. There is no distension.     Musculoskeletal:      Right lower leg: No edema.      Left lower leg: No edema.     Skin:     General: Skin is warm and dry.     Neurological:      Mental Status: She is alert.      Cranial Nerves: No cranial nerve deficit.      Motor: No weakness.      Comments: MMSE 5/30   Psychiatric:         Mood and Affect: Mood normal.         Behavior: Behavior normal.          Assessment/Plan   Diagnoses and all orders for this visit:  Dementia without behavioral disturbance (Multi)  Congestive heart failure, unspecified HF chronicity, unspecified heart failure type  -     furosemide (Lasix) 40 mg tablet; Take 1 tablet (40 mg) by mouth once daily as needed (leg swelling).      Patient seen on follow-up    #Advance care planning  #Dementia with help with behavioral disturbance  #Difficulty with ADLs  #Suspected bladder cancer  Patient's Mini-Mental status exam, consistent with severe dementia.  It is more appropriate for assisted living  Would recommend memory care for the patient  Due to lower extremity swelling, increase Lasix  Discussed medications and workup, for underlying dementia.  Family would like to hold off at this time, would want to focus on comfort.  Would be appropriate for hospice.    >45 minutes spent in counseling    Return to care as needed, likely will undergo care at nursing facility.  Will

## 2025-07-30 ENCOUNTER — APPOINTMENT (OUTPATIENT)
Dept: OTOLARYNGOLOGY | Facility: CLINIC | Age: 85
End: 2025-07-30
Payer: MEDICARE

## 2025-08-18 ENCOUNTER — APPOINTMENT (OUTPATIENT)
Dept: OTOLARYNGOLOGY | Facility: CLINIC | Age: 85
End: 2025-08-18
Payer: MEDICARE

## 2025-09-08 ENCOUNTER — APPOINTMENT (OUTPATIENT)
Dept: CARDIOLOGY | Facility: CLINIC | Age: 85
End: 2025-09-08
Payer: MEDICARE

## 2025-10-17 ENCOUNTER — APPOINTMENT (OUTPATIENT)
Dept: PRIMARY CARE | Facility: CLINIC | Age: 85
End: 2025-10-17
Payer: MEDICARE

## (undated) DEVICE — Device

## (undated) DEVICE — SYRINGE, 30 CC, LUER SLIP TIP

## (undated) DEVICE — CATHETER, OPTITORQUE, 5FR, TIG, 1H/100CM

## (undated) DEVICE — COLLECTION BAG, FLUID, NON-STERILE

## (undated) DEVICE — LOOP, BIPOLAR CUTTING, 24/26 FR, F/URO, 0.35MM, STERILE

## (undated) DEVICE — ELECTRODE, VAPOENUCLEATION BIPOLAR, HEMISPHERICAL 24/26FR, STERILE

## (undated) DEVICE — GUIDE WIRE, 260CM, HI-TORQUE, VERSACORE, MODIFIED J

## (undated) DEVICE — INTRODUCER, GLIDESHEATH SLENDER A-KIT, 6FR 10CM

## (undated) DEVICE — SYRINGE, 10 CC, SLIP TIP

## (undated) DEVICE — TR BAND, RADIAL COMPRESSION, STANDARD, 24CM

## (undated) DEVICE — GUIDEWIRE, INQWIRE, 3MM J, .035 X 210CM, FIXED